# Patient Record
Sex: FEMALE | Race: WHITE | NOT HISPANIC OR LATINO | Employment: OTHER | URBAN - NONMETROPOLITAN AREA
[De-identification: names, ages, dates, MRNs, and addresses within clinical notes are randomized per-mention and may not be internally consistent; named-entity substitution may affect disease eponyms.]

---

## 2017-04-26 ENCOUNTER — HOSPITAL ENCOUNTER (EMERGENCY)
Facility: HOSPITAL | Age: 67
Discharge: HOME | End: 2017-04-26
Attending: EMERGENCY MEDICINE | Admitting: EMERGENCY MEDICINE

## 2017-04-26 ENCOUNTER — APPOINTMENT (OUTPATIENT)
Dept: GENERAL RADIOLOGY | Facility: HOSPITAL | Age: 67
End: 2017-04-26

## 2017-04-26 VITALS
TEMPERATURE: 99 F | RESPIRATION RATE: 20 BRPM | WEIGHT: 203 LBS | OXYGEN SATURATION: 96 % | HEART RATE: 88 BPM | SYSTOLIC BLOOD PRESSURE: 170 MMHG | DIASTOLIC BLOOD PRESSURE: 71 MMHG | BODY MASS INDEX: 35.97 KG/M2 | HEIGHT: 63 IN

## 2017-04-26 DIAGNOSIS — R09.81 NASAL CONGESTION: Primary | ICD-10-CM

## 2017-04-26 LAB
FLUAV AG NPH QL: NEGATIVE
FLUBV AG NPH QL IA: NEGATIVE
S PYO AG THROAT QL: NEGATIVE

## 2017-04-26 PROCEDURE — 87081 CULTURE SCREEN ONLY: CPT | Performed by: EMERGENCY MEDICINE

## 2017-04-26 PROCEDURE — 71020 HC CHEST PA AND LATERAL: CPT

## 2017-04-26 PROCEDURE — 87804 INFLUENZA ASSAY W/OPTIC: CPT | Performed by: EMERGENCY MEDICINE

## 2017-04-26 PROCEDURE — 87880 STREP A ASSAY W/OPTIC: CPT | Performed by: EMERGENCY MEDICINE

## 2017-04-26 PROCEDURE — 99283 EMERGENCY DEPT VISIT LOW MDM: CPT

## 2017-04-26 RX ORDER — OXYMETAZOLINE HYDROCHLORIDE 0.05 G/100ML
2 SPRAY NASAL ONCE
Status: COMPLETED | OUTPATIENT
Start: 2017-04-26 | End: 2017-04-26

## 2017-04-26 RX ADMIN — OXYMETAZOLINE HYDROCHLORIDE 2 SPRAY: 5 SPRAY NASAL at 18:45

## 2017-04-26 NOTE — DISCHARGE INSTRUCTIONS
"Most recent vital signs: /71  Pulse 88  Temp 99 °F (37.2 °C)  Resp 20  Ht 63\" (160 cm)  Wt 203 lb (92.1 kg)  SpO2 96%  BMI 35.96 kg/m2      --PLEASE READ THESE DIRECTIONS IN FULL--     Our goal is to provide the best care we can AND to find any medical or surgical emergency while you are in the ER. If a medical or surgical emergency was not identified during your visit (or if the issue was resolved during the visit), following these directions for follow-up with a primary care provider and/or specialists and following the return precautions will be the safest and most effective way to protect your health after leaving the emergency room.     Therefore, in addition to the conversation we had in the room, please read all of the information in these discharge instructions, take medications as directed, and follow-up as directed.     You should seek immediate medical help for:     Worsening pain that is not helped with prescribed pain medicines and/or the recommended doses of over the counter pain medicines such as acetaminophen (Tylenol) or ibuprofen (Motrin/Advil)*.   New or worsening chest pain or trouble breathing   New or worsening headache, confusion, weakness, or visual changes   New or worsening fever (>100.4 F) that does not improve with the recommended doses of over the counter fever treatments such as acetaminophen (Tylenol) or ibuprofen (Motrin/Advil)* for more than a few hours.   Any other concerns that you feel could be life, limb, or eye-sight threatening     As a reminder, if you received any medicines or prescriptions for medicines that may be sedating (\"narcotics\", \"opioids\"), do NOT:   - operate any vehicles   - take if you are already tired   - take if you have had or plan to have alcohol   - perform other responsibilities that require your full attention.     Common medications include, but are not limited to:   Opiates: Morphine, Norco, Lortab, Vicodin, Percocet, oxycodone, " hydrocodone, Dilaudid, hydromorphone   Benzodiazepines: Ativan, Valium, alprazolam, lorazepam, diazepam,   Other sedating medications: promethazine, Phenergan, trazodone, Benadryl, hydroxyzine, Vistaril, diphenhydramine, zolpidem, and Ambien     *If you have any history of GI (stomach/intestinal) bleeding, allergic reactions, kidney problems, and/or certain heart problems or there is any chance you could be pregnant, and have been told to avoid NSAIDs (ibuprofen, naproxen, Aleve, Motrin, Advil) please avoid these medications. Please remember that prescribed pain medicines often contain Tylenol/acetaminophen, so make sure your total daily (24 hour) intake does not exceed 4 grams or 4000mg.

## 2017-04-26 NOTE — ED PROVIDER NOTES
TRIAGE CHIEF COMPLAINT:     Nursing and triage notes reviewed    Chief Complaint   Patient presents with   • URI      HPI: Taya Lara is a 66 y.o. female who presents to the emergency department complaining of Upper respiratory infection like symptoms.  Patient states for the past several days has had nasal congestion, sinus pressure, nonproductive cough, and some general malaise.  No fever that they are aware of.  Drainage from the nose is been largely clear.  Drainage seems worse from the right nostril.  Some slight shortness of breath associated with cough that seems worse when she is eating.  States she did not get a flu shot this year.  Denies any chest pain.  No abdominal pain, nausea, or vomiting.     REVIEW OF SYSTEMS: Otherwise negative unless stated above     PAST MEDICAL HISTORY:   Past Medical History:   Diagnosis Date   • Diabetes mellitus    • Disease of thyroid gland    • Hyperlipidemia    • Hypertension    • Kidney stone         FAMILY HISTORY:   History reviewed. No pertinent family history.     SOCIAL HISTORY:   Social History     Social History   • Marital status: Single     Spouse name: N/A   • Number of children: N/A   • Years of education: N/A     Occupational History   • Not on file.     Social History Main Topics   • Smoking status: Former Smoker   • Smokeless tobacco: Not on file   • Alcohol use No   • Drug use: No   • Sexual activity: Defer     Other Topics Concern   • Not on file     Social History Narrative   • No narrative on file        SURGICAL HISTORY:   Past Surgical History:   Procedure Laterality Date   • CHOLECYSTECTOMY     • PACEMAKER IMPLANTATION     • TUBAL ABDOMINAL LIGATION          CURRENT MEDICATIONS:      Medication List      Notice     You have not been prescribed any medications.         ALLERGIES: Review of patient's allergies indicates no known allergies.     PHYSICAL EXAM:   VITAL SIGNS:   Vitals:    04/26/17 1723   BP: 170/71   Pulse: 88   Resp: 20   Temp: 99 °F  (37.2 °C)   SpO2: 96%      CONSTITUTIONAL: Awake, oriented, appears non-toxic   HENT: Atraumatic, normocephalic, oral mucosa pink and moist, airway patent. Nares patent without drainage. External ears normal. Some clear drainage from the bilateral nares, some tenderness to percussion over the right maxillary sinus.  EYES: Conjunctiva clear, EOMI, PERRL   NECK: Trachea midline, non-tender, supple   CARDIOVASCULAR: Normal heart rate, Normal rhythm, No murmurs, rubs, gallops   PULMONARY/CHEST: Clear to auscultation, no rhonchi, wheezes, or rales. Symmetrical breath sounds.  ABDOMINAL: Non-distended, soft, non-tender - no rebound or guarding.  NEUROLOGIC: Non-focal, moving all four extremities, no gross sensory or motor deficits.   EXTREMITIES: No clubbing, cyanosis, or edema   SKIN: Warm, Dry, No erythema, No rash     ED COURSE / MEDICAL DECISION MAKING:   Taya Lara is a 66 y.o. female who presents to the emergency department for evaluation of upper respiratory infection like symptoms.  Patient in no acute distress on arrival with stable vital signs.  Exam does reveal some tenderness over the right maxillary sinus.  Chest x-ray per my interpretation did not reveal any acute abnormalities.  Strep and influenza screens are negative.  Discussed with patient seen any overwhelming signs of infection at this time with some clear drainage from the nose.  Patient thinks it might be allergies.  Offered some allergy medication however patient stated she could use over-the-counter medication.  Return precautions were discussed.    DECISION TO DISCHARGE/ADMIT: see ED care timeline     FINAL IMPRESSION:   1 -- nasal congestion   2 --   3 --     Electronically signed by: Yesenia Randolph MD, 4/26/2017 5:55 PM       Yesenia Randolph MD  04/26/17 5439

## 2017-04-28 LAB — BACTERIA SPEC AEROBE CULT: NORMAL

## 2017-07-14 ENCOUNTER — APPOINTMENT (OUTPATIENT)
Dept: GENERAL RADIOLOGY | Facility: HOSPITAL | Age: 67
End: 2017-07-14

## 2017-07-14 ENCOUNTER — HOSPITAL ENCOUNTER (EMERGENCY)
Facility: HOSPITAL | Age: 67
Discharge: HOME | End: 2017-07-14
Attending: EMERGENCY MEDICINE | Admitting: EMERGENCY MEDICINE

## 2017-07-14 VITALS
TEMPERATURE: 98 F | SYSTOLIC BLOOD PRESSURE: 155 MMHG | HEART RATE: 95 BPM | DIASTOLIC BLOOD PRESSURE: 77 MMHG | BODY MASS INDEX: 35.44 KG/M2 | RESPIRATION RATE: 18 BRPM | WEIGHT: 200 LBS | OXYGEN SATURATION: 94 % | HEIGHT: 63 IN

## 2017-07-14 DIAGNOSIS — M25.542 JOINT PAIN IN FINGERS OF LEFT HAND: Primary | ICD-10-CM

## 2017-07-14 LAB
ALBUMIN SERPL-MCNC: 4.3 G/DL (ref 3.5–5)
ALBUMIN/GLOB SERPL: 1.5 G/DL (ref 1–2)
ALP SERPL-CCNC: 50 U/L (ref 38–126)
ALT SERPL W P-5'-P-CCNC: 36 U/L (ref 13–69)
ANION GAP SERPL CALCULATED.3IONS-SCNC: 15.8 MMOL/L
AST SERPL-CCNC: 27 U/L (ref 15–46)
BASOPHILS # BLD AUTO: 0.03 10*3/MM3 (ref 0–0.2)
BASOPHILS NFR BLD AUTO: 0.3 % (ref 0–2.5)
BILIRUB SERPL-MCNC: 0.3 MG/DL (ref 0.2–1.3)
BUN BLD-MCNC: 23 MG/DL (ref 7–20)
BUN/CREAT SERPL: 25.6 (ref 7.1–23.5)
CALCIUM SPEC-SCNC: 10.1 MG/DL (ref 8.4–10.2)
CHLORIDE SERPL-SCNC: 100 MMOL/L (ref 98–107)
CO2 SERPL-SCNC: 30 MMOL/L (ref 26–30)
CREAT BLD-MCNC: 0.9 MG/DL (ref 0.6–1.3)
DEPRECATED RDW RBC AUTO: 45.4 FL (ref 37–54)
EOSINOPHIL # BLD AUTO: 0.12 10*3/MM3 (ref 0–0.7)
EOSINOPHIL NFR BLD AUTO: 1.4 % (ref 0–7)
ERYTHROCYTE [DISTWIDTH] IN BLOOD BY AUTOMATED COUNT: 13.1 % (ref 11.5–14.5)
GFR SERPL CREATININE-BSD FRML MDRD: 63 ML/MIN/1.73
GLOBULIN UR ELPH-MCNC: 2.9 GM/DL
GLUCOSE BLD-MCNC: 211 MG/DL (ref 74–98)
HCT VFR BLD AUTO: 39.8 % (ref 37–47)
HGB BLD-MCNC: 12.9 G/DL (ref 12–16)
IMM GRANULOCYTES # BLD: 0.03 10*3/MM3 (ref 0–0.06)
IMM GRANULOCYTES NFR BLD: 0.3 % (ref 0–0.6)
LYMPHOCYTES # BLD AUTO: 3.14 10*3/MM3 (ref 0.6–3.4)
LYMPHOCYTES NFR BLD AUTO: 35.7 % (ref 10–50)
MCH RBC QN AUTO: 30.6 PG (ref 27–31)
MCHC RBC AUTO-ENTMCNC: 32.4 G/DL (ref 30–37)
MCV RBC AUTO: 94.5 FL (ref 81–99)
MONOCYTES # BLD AUTO: 0.75 10*3/MM3 (ref 0–0.9)
MONOCYTES NFR BLD AUTO: 8.5 % (ref 0–12)
NEUTROPHILS # BLD AUTO: 4.72 10*3/MM3 (ref 2–6.9)
NEUTROPHILS NFR BLD AUTO: 53.8 % (ref 37–80)
NRBC BLD MANUAL-RTO: 0 /100 WBC (ref 0–0)
PLATELET # BLD AUTO: 312 10*3/MM3 (ref 130–400)
PMV BLD AUTO: 11.3 FL (ref 6–12)
POTASSIUM BLD-SCNC: 3.8 MMOL/L (ref 3.5–5.1)
PROT SERPL-MCNC: 7.2 G/DL (ref 6.3–8.2)
RBC # BLD AUTO: 4.21 10*6/MM3 (ref 4.2–5.4)
SODIUM BLD-SCNC: 142 MMOL/L (ref 137–145)
URATE SERPL-MCNC: 4.8 MG/DL (ref 2.5–8.5)
WBC NRBC COR # BLD: 8.79 10*3/MM3 (ref 4.8–10.8)

## 2017-07-14 PROCEDURE — 84550 ASSAY OF BLOOD/URIC ACID: CPT | Performed by: NURSE PRACTITIONER

## 2017-07-14 PROCEDURE — 85025 COMPLETE CBC W/AUTO DIFF WBC: CPT | Performed by: NURSE PRACTITIONER

## 2017-07-14 PROCEDURE — 80053 COMPREHEN METABOLIC PANEL: CPT | Performed by: NURSE PRACTITIONER

## 2017-07-14 PROCEDURE — 99283 EMERGENCY DEPT VISIT LOW MDM: CPT

## 2017-07-14 PROCEDURE — 73110 X-RAY EXAM OF WRIST: CPT

## 2017-07-14 PROCEDURE — 73130 X-RAY EXAM OF HAND: CPT

## 2017-07-14 RX ORDER — MELOXICAM 7.5 MG/1
7.5 TABLET ORAL DAILY
Qty: 15 TABLET | Refills: 0 | Status: ON HOLD | OUTPATIENT
Start: 2017-07-14 | End: 2019-01-21

## 2017-07-14 NOTE — ED PROVIDER NOTES
Subjective   History of Present Illness  This is a 66-year-old female who comes in today complaining of left little finger pain radiating down into her wrist.  She states that she woke up about a month ago with swelling and pain to her little finger.  She states she felt like that it was probably arthritis and just ignored it but the pain has not gotten any better and the swelling has not gone away.  She does have a history of psoriasis to both of her hands.  Review of Systems   Constitutional: Negative.    HENT: Negative.    Eyes: Negative.    Respiratory: Negative.    Cardiovascular: Negative.    Gastrointestinal: Negative.    Genitourinary: Negative.    Musculoskeletal: Positive for joint swelling.   Skin: Negative.    Neurological: Negative.    Psychiatric/Behavioral: Negative.    All other systems reviewed and are negative.      Past Medical History:   Diagnosis Date   • Diabetes mellitus    • Disease of thyroid gland    • Hyperlipidemia    • Hypertension    • Kidney stone        No Known Allergies    Past Surgical History:   Procedure Laterality Date   • CHOLECYSTECTOMY     • PACEMAKER IMPLANTATION     • TUBAL ABDOMINAL LIGATION         History reviewed. No pertinent family history.    Social History     Social History   • Marital status: Single     Spouse name: N/A   • Number of children: N/A   • Years of education: N/A     Social History Main Topics   • Smoking status: Former Smoker   • Smokeless tobacco: None   • Alcohol use No   • Drug use: No   • Sexual activity: Defer     Other Topics Concern   • None     Social History Narrative           Objective   Physical Exam   Constitutional: She appears well-developed and well-nourished.   Nursing note and vitals reviewed.  GEN: No acute distress  Head: Normocephalic, atraumatic  Eyes: Pupils equal round reactive to light  ENT: Posterior pharynx normal in appearance, oral mucosa is moist  Chest: Nontender to palpation  Cardiovascular: Regular rate  Lungs: Clear  to auscultation bilaterally  Abdomen: Soft, nontender, nondistended, no peritoneal signs  Extremities: No edema, normal appearance left 5th finger with edema, limited rom. Tenderness and edema down palm and lateral side of wrist with limited ROM  Neuro: GCS 15  Psych: Mood and affect are appropriate      Procedures         ED Course  ED Course                  MDM  Number of Diagnoses or Management Options     Amount and/or Complexity of Data Reviewed  Clinical lab tests: ordered and reviewed  Tests in the radiology section of CPT®: ordered and reviewed    Risk of Complications, Morbidity, and/or Mortality  Presenting problems: moderate  Diagnostic procedures: moderate  Management options: moderate        Final diagnoses:   Joint pain in fingers of left hand            ASHLIE Singh  07/14/17 2005

## 2018-01-18 ENCOUNTER — APPOINTMENT (OUTPATIENT)
Dept: CT IMAGING | Facility: HOSPITAL | Age: 68
End: 2018-01-18

## 2018-01-18 ENCOUNTER — APPOINTMENT (OUTPATIENT)
Dept: GENERAL RADIOLOGY | Facility: HOSPITAL | Age: 68
End: 2018-01-18

## 2018-01-18 ENCOUNTER — HOSPITAL ENCOUNTER (EMERGENCY)
Facility: HOSPITAL | Age: 68
Discharge: HOME | End: 2018-01-18
Attending: EMERGENCY MEDICINE | Admitting: EMERGENCY MEDICINE

## 2018-01-18 VITALS
WEIGHT: 150 LBS | DIASTOLIC BLOOD PRESSURE: 74 MMHG | HEART RATE: 77 BPM | SYSTOLIC BLOOD PRESSURE: 137 MMHG | HEIGHT: 63 IN | OXYGEN SATURATION: 95 % | TEMPERATURE: 98.3 F | BODY MASS INDEX: 26.58 KG/M2 | RESPIRATION RATE: 18 BRPM

## 2018-01-18 DIAGNOSIS — N30.00 ACUTE CYSTITIS WITHOUT HEMATURIA: Primary | ICD-10-CM

## 2018-01-18 DIAGNOSIS — R42 DIZZINESS: ICD-10-CM

## 2018-01-18 LAB
ALBUMIN SERPL-MCNC: 4.5 G/DL (ref 3.5–5)
ALBUMIN/GLOB SERPL: 1.5 G/DL (ref 1–2)
ALP SERPL-CCNC: 61 U/L (ref 38–126)
ALT SERPL W P-5'-P-CCNC: 42 U/L (ref 13–69)
ANION GAP SERPL CALCULATED.3IONS-SCNC: 12 MMOL/L
AST SERPL-CCNC: 30 U/L (ref 15–46)
BACTERIA UR QL AUTO: ABNORMAL /HPF
BASOPHILS # BLD AUTO: 0.04 10*3/MM3 (ref 0–0.2)
BASOPHILS NFR BLD AUTO: 0.4 % (ref 0–2.5)
BILIRUB SERPL-MCNC: 0.4 MG/DL (ref 0.2–1.3)
BILIRUB UR QL STRIP: NEGATIVE
BUN BLD-MCNC: 23 MG/DL (ref 7–20)
BUN/CREAT SERPL: 28.8 (ref 7.1–23.5)
CALCIUM SPEC-SCNC: 10.8 MG/DL (ref 8.4–10.2)
CHLORIDE SERPL-SCNC: 101 MMOL/L (ref 98–107)
CLARITY UR: CLEAR
CO2 SERPL-SCNC: 31 MMOL/L (ref 26–30)
COLOR UR: YELLOW
CREAT BLD-MCNC: 0.8 MG/DL (ref 0.6–1.3)
DEPRECATED RDW RBC AUTO: 44.7 FL (ref 37–54)
EOSINOPHIL # BLD AUTO: 0.16 10*3/MM3 (ref 0–0.7)
EOSINOPHIL NFR BLD AUTO: 1.5 % (ref 0–7)
ERYTHROCYTE [DISTWIDTH] IN BLOOD BY AUTOMATED COUNT: 13.2 % (ref 11.5–14.5)
GFR SERPL CREATININE-BSD FRML MDRD: 72 ML/MIN/1.73
GLOBULIN UR ELPH-MCNC: 3.1 GM/DL
GLUCOSE BLD-MCNC: 123 MG/DL (ref 74–98)
GLUCOSE BLDC GLUCOMTR-MCNC: 117 MG/DL (ref 70–130)
GLUCOSE UR STRIP-MCNC: NEGATIVE MG/DL
HCT VFR BLD AUTO: 40.2 % (ref 37–47)
HGB BLD-MCNC: 13 G/DL (ref 12–16)
HGB UR QL STRIP.AUTO: NEGATIVE
HOLD SPECIMEN: NORMAL
HOLD SPECIMEN: NORMAL
HYALINE CASTS UR QL AUTO: ABNORMAL /LPF
IMM GRANULOCYTES # BLD: 0.03 10*3/MM3 (ref 0–0.06)
IMM GRANULOCYTES NFR BLD: 0.3 % (ref 0–0.6)
KETONES UR QL STRIP: NEGATIVE
LEUKOCYTE ESTERASE UR QL STRIP.AUTO: ABNORMAL
LYMPHOCYTES # BLD AUTO: 3.36 10*3/MM3 (ref 0.6–3.4)
LYMPHOCYTES NFR BLD AUTO: 31.7 % (ref 10–50)
MAGNESIUM SERPL-MCNC: 1.8 MG/DL (ref 1.6–2.3)
MCH RBC QN AUTO: 30.2 PG (ref 27–31)
MCHC RBC AUTO-ENTMCNC: 32.3 G/DL (ref 30–37)
MCV RBC AUTO: 93.5 FL (ref 81–99)
MONOCYTES # BLD AUTO: 0.78 10*3/MM3 (ref 0–0.9)
MONOCYTES NFR BLD AUTO: 7.4 % (ref 0–12)
NEUTROPHILS # BLD AUTO: 6.22 10*3/MM3 (ref 2–6.9)
NEUTROPHILS NFR BLD AUTO: 58.7 % (ref 37–80)
NITRITE UR QL STRIP: NEGATIVE
NRBC BLD MANUAL-RTO: 0 /100 WBC (ref 0–0)
PH UR STRIP.AUTO: 6 [PH] (ref 5–8)
PLATELET # BLD AUTO: 313 10*3/MM3 (ref 130–400)
PMV BLD AUTO: 11.4 FL (ref 6–12)
POTASSIUM BLD-SCNC: 4 MMOL/L (ref 3.5–5.1)
PROT SERPL-MCNC: 7.6 G/DL (ref 6.3–8.2)
PROT UR QL STRIP: NEGATIVE
RBC # BLD AUTO: 4.3 10*6/MM3 (ref 4.2–5.4)
RBC # UR: ABNORMAL /HPF
REF LAB TEST METHOD: ABNORMAL
SODIUM BLD-SCNC: 140 MMOL/L (ref 137–145)
SP GR UR STRIP: 1.01 (ref 1–1.03)
SQUAMOUS #/AREA URNS HPF: ABNORMAL /HPF
TROPONIN I SERPL-MCNC: <0.012 NG/ML (ref 0–0.03)
UROBILINOGEN UR QL STRIP: ABNORMAL
WBC NRBC COR # BLD: 10.59 10*3/MM3 (ref 4.8–10.8)
WBC UR QL AUTO: ABNORMAL /HPF
WHOLE BLOOD HOLD SPECIMEN: NORMAL
WHOLE BLOOD HOLD SPECIMEN: NORMAL

## 2018-01-18 PROCEDURE — 82962 GLUCOSE BLOOD TEST: CPT

## 2018-01-18 PROCEDURE — 85025 COMPLETE CBC W/AUTO DIFF WBC: CPT | Performed by: EMERGENCY MEDICINE

## 2018-01-18 PROCEDURE — 84484 ASSAY OF TROPONIN QUANT: CPT | Performed by: EMERGENCY MEDICINE

## 2018-01-18 PROCEDURE — 71045 X-RAY EXAM CHEST 1 VIEW: CPT

## 2018-01-18 PROCEDURE — 87086 URINE CULTURE/COLONY COUNT: CPT | Performed by: EMERGENCY MEDICINE

## 2018-01-18 PROCEDURE — 70450 CT HEAD/BRAIN W/O DYE: CPT

## 2018-01-18 PROCEDURE — 80053 COMPREHEN METABOLIC PANEL: CPT | Performed by: EMERGENCY MEDICINE

## 2018-01-18 PROCEDURE — 99284 EMERGENCY DEPT VISIT MOD MDM: CPT

## 2018-01-18 PROCEDURE — 93005 ELECTROCARDIOGRAM TRACING: CPT | Performed by: EMERGENCY MEDICINE

## 2018-01-18 PROCEDURE — 83735 ASSAY OF MAGNESIUM: CPT | Performed by: EMERGENCY MEDICINE

## 2018-01-18 PROCEDURE — 81001 URINALYSIS AUTO W/SCOPE: CPT | Performed by: EMERGENCY MEDICINE

## 2018-01-18 RX ORDER — BUSPIRONE HYDROCHLORIDE 10 MG/1
10 TABLET ORAL 3 TIMES DAILY
COMMUNITY

## 2018-01-18 RX ORDER — DIGOXIN 125 MCG
125 TABLET ORAL
COMMUNITY

## 2018-01-18 RX ORDER — MULTIPLE VITAMINS W/ MINERALS TAB 9MG-400MCG
1 TAB ORAL DAILY
COMMUNITY

## 2018-01-18 RX ORDER — CEFUROXIME AXETIL 250 MG/1
500 TABLET ORAL ONCE
Status: COMPLETED | OUTPATIENT
Start: 2018-01-18 | End: 2018-01-18

## 2018-01-18 RX ORDER — VENLAFAXINE HYDROCHLORIDE 150 MG/1
150 CAPSULE, EXTENDED RELEASE ORAL DAILY
COMMUNITY

## 2018-01-18 RX ORDER — LEVOTHYROXINE SODIUM 0.12 MG/1
175 TABLET ORAL DAILY
COMMUNITY

## 2018-01-18 RX ORDER — SULFAMETHOXAZOLE AND TRIMETHOPRIM 800; 160 MG/1; MG/1
1 TABLET ORAL 2 TIMES DAILY
Qty: 10 TABLET | Refills: 0 | Status: SHIPPED | OUTPATIENT
Start: 2018-01-18 | End: 2018-01-23

## 2018-01-18 RX ORDER — FUROSEMIDE 40 MG/1
40 TABLET ORAL DAILY
COMMUNITY

## 2018-01-18 RX ORDER — ASPIRIN 81 MG/1
81 TABLET ORAL DAILY
COMMUNITY

## 2018-01-18 RX ORDER — SODIUM CHLORIDE 0.9 % (FLUSH) 0.9 %
10 SYRINGE (ML) INJECTION AS NEEDED
Status: DISCONTINUED | OUTPATIENT
Start: 2018-01-18 | End: 2018-01-19 | Stop reason: HOSPADM

## 2018-01-18 RX ORDER — CHLORAL HYDRATE 500 MG
1000 CAPSULE ORAL
COMMUNITY

## 2018-01-18 RX ORDER — PRAVASTATIN SODIUM 80 MG/1
80 TABLET ORAL DAILY
COMMUNITY

## 2018-01-18 RX ORDER — FENOFIBRATE 145 MG/1
145 TABLET, COATED ORAL DAILY
COMMUNITY

## 2018-01-18 RX ORDER — CARVEDILOL 25 MG/1
25 TABLET ORAL DAILY
COMMUNITY

## 2018-01-18 RX ORDER — CLONIDINE HYDROCHLORIDE 0.1 MG/1
0.1 TABLET ORAL ONCE
Status: COMPLETED | OUTPATIENT
Start: 2018-01-18 | End: 2018-01-18

## 2018-01-18 RX ADMIN — CEFUROXIME AXETIL 500 MG: 250 TABLET ORAL at 22:04

## 2018-01-18 RX ADMIN — CLONIDINE HYDROCHLORIDE 0.1 MG: 0.1 TABLET ORAL at 20:16

## 2018-01-19 NOTE — DISCHARGE INSTRUCTIONS
Dizziness  Dizziness is a common problem. It makes you feel unsteady or lightheaded. You may feel like you are about to pass out (faint). Dizziness can lead to injury if you stumble or fall. Anyone can get dizzy, but dizziness is more common in older adults. This condition can be caused by a number of things, including:  · Medicines.  · Dehydration.  · Illness.  Follow these instructions at home:  Following these instructions may help with your condition:  Eating and drinking  · Drink enough fluid to keep your pee (urine) clear or pale yellow. This helps to keep you from getting dehydrated. Try to drink more clear fluids, such as water.  · Do not drink alcohol.  · Limit how much caffeine you drink or eat if told by your doctor.  · Limit how much salt you drink or eat if told by your doctor.  Activity  · Avoid making quick movements.  ¨ When you stand up from sitting in a chair, steady yourself until you feel okay.  ¨ In the morning, first sit up on the side of the bed. When you feel okay, stand slowly while you hold onto something. Do this until you know that your balance is fine.  · Move your legs often if you need to  one place for a long time. Tighten and relax your muscles in your legs while you are standing.  · Do not drive or use heavy machinery if you feel dizzy.  · Avoid bending down if you feel dizzy. Place items in your home so that they are easy for you to reach without leaning over.  Lifestyle  · Do not use any tobacco products, including cigarettes, chewing tobacco, or electronic cigarettes. If you need help quitting, ask your doctor.  · Try to lower your stress level, such as with yoga or meditation. Talk with your doctor if you need help.  General instructions  · Watch your dizziness for any changes.  · Take medicines only as told by your doctor. Talk with your doctor if you think that your dizziness is caused by a medicine that you are taking.  · Tell a friend or a family member that you are  feeling dizzy. If he or she notices any changes in your behavior, have this person call your doctor.  · Keep all follow-up visits as told by your doctor. This is important.  Contact a doctor if:  · Your dizziness does not go away.  · Your dizziness or light-headedness gets worse.  · You feel sick to your stomach (nauseous).  · You have trouble hearing.  · You have new symptoms.  · You are unsteady on your feet or you feel like the room is spinning.  Get help right away if:  · You throw up (vomit) or have diarrhea and are unable to eat or drink anything.  · You have trouble:  ¨ Talking.  ¨ Walking.  ¨ Swallowing.  ¨ Using your arms, hands, or legs.  · You feel generally weak.  · You are not thinking clearly or you have trouble forming sentences. It may take a friend or family member to notice this.  · You have:  ¨ Chest pain.  ¨ Pain in your belly (abdomen).  ¨ Shortness of breath.  ¨ Sweating.  · Your vision changes.  · You are bleeding.  · You have a headache.  · You have neck pain or a stiff neck.  · You have a fever.  This information is not intended to replace advice given to you by your health care provider. Make sure you discuss any questions you have with your health care provider.  Document Released: 12/06/2012 Document Revised: 05/25/2017 Document Reviewed: 12/14/2015  Elsevier Interactive Patient Education © 2017 [employer]

## 2018-01-19 NOTE — ED PROVIDER NOTES
Subjective   HPI Comments: 67-year-old female presents to the emergency department with dizziness intermittently for one month.  Also reports that she has difficulty with hearing and has been doing sign language with the patient.  He reports that the patient has had dizziness for 1 month intermittently.  He reports that the symptoms are worse with movement.  Fever no chills or chills no blurred vision.  No chest pain no shortness of breath no passing out.      History provided by:  Patient and spouse   used: No        Review of Systems   Neurological: Positive for dizziness.   All other systems reviewed and are negative.      Past Medical History:   Diagnosis Date   • Deaf    • Diabetes mellitus    • Disease of thyroid gland    • Hyperlipidemia    • Hypertension    • Kidney stone        No Known Allergies    Past Surgical History:   Procedure Laterality Date   • CHOLECYSTECTOMY     • PACEMAKER IMPLANTATION     • TUBAL ABDOMINAL LIGATION         History reviewed. No pertinent family history.    Social History     Social History   • Marital status: Single     Spouse name: N/A   • Number of children: N/A   • Years of education: N/A     Social History Main Topics   • Smoking status: Former Smoker   • Smokeless tobacco: None   • Alcohol use No   • Drug use: No   • Sexual activity: Defer     Other Topics Concern   • None     Social History Narrative   • None           Objective   Physical Exam   Constitutional: She is oriented to person, place, and time. She appears well-developed and well-nourished.   Eyes: EOM are normal.   Neck: Normal range of motion. Neck supple.   Cardiovascular: Normal rate and regular rhythm.    Pulmonary/Chest: Effort normal and breath sounds normal.   Abdominal: Soft. Bowel sounds are normal.   Musculoskeletal: Normal range of motion.   Neurological: She is alert and oriented to person, place, and time.   Skin: Skin is warm and dry.   Psychiatric: She has a normal mood and  affect. Her behavior is normal. Judgment and thought content normal.   Nursing note and vitals reviewed.      Procedures         ED Course  ED Course                  MDM    Final diagnoses:   Acute cystitis without hematuria   Dizziness            Kirit Ogden Jr., PA-C  01/18/18 6340

## 2018-01-20 LAB — BACTERIA SPEC AEROBE CULT: NORMAL

## 2018-01-22 ENCOUNTER — LAB REQUISITION (OUTPATIENT)
Dept: LAB | Facility: HOSPITAL | Age: 68
End: 2018-01-22

## 2018-01-22 DIAGNOSIS — Z00.00 ROUTINE GENERAL MEDICAL EXAMINATION AT A HEALTH CARE FACILITY: ICD-10-CM

## 2018-01-22 LAB — TROPONIN I SERPL-MCNC: <0.006 NG/ML

## 2018-01-22 PROCEDURE — 84484 ASSAY OF TROPONIN QUANT: CPT

## 2018-02-07 ENCOUNTER — HOSPITAL ENCOUNTER (EMERGENCY)
Facility: HOSPITAL | Age: 68
Discharge: HOME | End: 2018-02-07
Attending: EMERGENCY MEDICINE | Admitting: EMERGENCY MEDICINE

## 2018-02-07 VITALS
BODY MASS INDEX: 34.91 KG/M2 | DIASTOLIC BLOOD PRESSURE: 85 MMHG | WEIGHT: 197 LBS | OXYGEN SATURATION: 96 % | HEIGHT: 63 IN | RESPIRATION RATE: 18 BRPM | TEMPERATURE: 98.2 F | HEART RATE: 90 BPM | SYSTOLIC BLOOD PRESSURE: 178 MMHG

## 2018-02-07 DIAGNOSIS — S16.1XXA CERVICAL STRAIN, ACUTE, INITIAL ENCOUNTER: Primary | ICD-10-CM

## 2018-02-07 PROCEDURE — 96372 THER/PROPH/DIAG INJ SC/IM: CPT

## 2018-02-07 PROCEDURE — 99283 EMERGENCY DEPT VISIT LOW MDM: CPT

## 2018-02-07 PROCEDURE — 25010000002 ORPHENADRINE CITRATE PER 60 MG: Performed by: PHYSICIAN ASSISTANT

## 2018-02-07 PROCEDURE — 25010000002 KETOROLAC TROMETHAMINE PER 15 MG: Performed by: PHYSICIAN ASSISTANT

## 2018-02-07 RX ORDER — ORPHENADRINE CITRATE 100 MG/1
100 TABLET, EXTENDED RELEASE ORAL 2 TIMES DAILY PRN
Qty: 14 TABLET | Refills: 0 | Status: SHIPPED | OUTPATIENT
Start: 2018-02-07 | End: 2019-01-14

## 2018-02-07 RX ORDER — ORPHENADRINE CITRATE 30 MG/ML
60 INJECTION INTRAMUSCULAR; INTRAVENOUS ONCE
Status: COMPLETED | OUTPATIENT
Start: 2018-02-07 | End: 2018-02-07

## 2018-02-07 RX ORDER — KETOROLAC TROMETHAMINE 30 MG/ML
30 INJECTION, SOLUTION INTRAMUSCULAR; INTRAVENOUS ONCE
Status: COMPLETED | OUTPATIENT
Start: 2018-02-07 | End: 2018-02-07

## 2018-02-07 RX ORDER — LISINOPRIL 40 MG/1
40 TABLET ORAL DAILY
COMMUNITY
End: 2019-02-07

## 2018-02-07 RX ORDER — CLONIDINE HYDROCHLORIDE 0.2 MG/1
0.2 TABLET ORAL ONCE
Status: COMPLETED | OUTPATIENT
Start: 2018-02-07 | End: 2018-02-07

## 2018-02-07 RX ADMIN — ORPHENADRINE CITRATE 60 MG: 30 INJECTION INTRAMUSCULAR; INTRAVENOUS at 19:37

## 2018-02-07 RX ADMIN — CLONIDINE HYDROCHLORIDE 0.2 MG: 0.2 TABLET ORAL at 20:35

## 2018-02-07 RX ADMIN — KETOROLAC TROMETHAMINE 30 MG: 30 INJECTION, SOLUTION INTRAMUSCULAR at 19:42

## 2018-02-08 NOTE — ED PROVIDER NOTES
Subjective   HPI Comments: 67-year-old female in the ER with her live-in boyfriend who gives the history as the patient is deaf.  Patient woke up with pain in her neck and right shoulder area, the boyfriend reports the patient slept wrong in the night.  Pain increases with range of motion movement.  She has not been taking any type treatment for this.  She denies any chest pain, difficulty breathing or cough.  Denies any numb or tingling sensations.      History provided by:  Significant other  History limited by: no limits.   used: No        Review of Systems   Constitutional: Negative for activity change, appetite change, fatigue and fever.   HENT: Negative for congestion, ear pain, rhinorrhea, sneezing and sore throat.    Eyes: Negative for pain, discharge and redness.   Respiratory: Negative for cough, shortness of breath and wheezing.    Cardiovascular: Negative.    Gastrointestinal: Negative for abdominal pain, constipation, diarrhea, nausea and vomiting.   Endocrine: Negative.    Genitourinary: Negative for difficulty urinating, dysuria and frequency.   Musculoskeletal: Positive for neck pain. Negative for back pain.   Skin: Negative for color change, pallor and rash.   Allergic/Immunologic: Negative.    Neurological: Negative.    Hematological: Negative.    Psychiatric/Behavioral: Negative.    All other systems reviewed and are negative.      Past Medical History:   Diagnosis Date   • Deaf    • Diabetes mellitus    • Disease of thyroid gland    • Hyperlipidemia    • Hypertension    • Kidney stone        No Known Allergies    Past Surgical History:   Procedure Laterality Date   • CHOLECYSTECTOMY     • PACEMAKER IMPLANTATION     • TUBAL ABDOMINAL LIGATION         History reviewed. No pertinent family history.    Social History     Social History   • Marital status: Single     Spouse name: N/A   • Number of children: N/A   • Years of education: N/A     Social History Main Topics   • Smoking  status: Former Smoker   • Smokeless tobacco: None   • Alcohol use No   • Drug use: No   • Sexual activity: Defer     Other Topics Concern   • None     Social History Narrative           Objective   Physical Exam   Constitutional: She is oriented to person, place, and time. She appears well-developed and well-nourished. No distress.   HENT:   Head: Normocephalic and atraumatic.   Right Ear: External ear normal.   Left Ear: External ear normal.   Nose: Nose normal.   Mouth/Throat: Oropharynx is clear and moist. No oropharyngeal exudate.   Eyes: Conjunctivae and EOM are normal. Pupils are equal, round, and reactive to light. Right eye exhibits no discharge. Left eye exhibits no discharge. No scleral icterus.   Neck: Normal range of motion. Neck supple. No JVD present. No tracheal deviation present.   Cardiovascular: Normal rate, regular rhythm and normal heart sounds.    Pulmonary/Chest: Effort normal and breath sounds normal. No stridor. No respiratory distress. She has no wheezes. She has no rales.   Abdominal: Soft. Bowel sounds are normal. She exhibits no distension and no mass. There is no tenderness. There is no rebound and no guarding. No hernia.   Musculoskeletal: Normal range of motion. She exhibits no edema or deformity.   Right shoulder/neck tenderness, right trapezium muscle spasm.   Neurological: She is alert and oriented to person, place, and time. No cranial nerve deficit. Coordination normal.   Skin: Skin is warm and dry. No rash noted. She is not diaphoretic. No erythema.   Psychiatric: She has a normal mood and affect. Her behavior is normal. Judgment and thought content normal.   Nursing note and vitals reviewed.      Procedures         ED Course  ED Course   Comment By Time   Patient feeling improved, discussed the diagnosis of pulled muscle with her boyfriend.  Will give prescriptions of muscle relaxant and anti-inflammatory, have encouraged the patient not to do any type of lifting tugging pulling  or any step strenuous activity with the affected extremity.  All questions answered to her satisfaction, she is agreeable to treatment plan to be discharged home. Jazz Espinal PA-C 02/07 2019                  [facility]    Final diagnoses:   Cervical strain, acute, initial encounter            Jazz Espinal PA-C  02/07/18 2031

## 2018-02-08 NOTE — DISCHARGE INSTRUCTIONS
Do not drive or operate machinery while taking Norflex.  Avoid any heavy lifting, tugging pulling or any other type strenuous activity with your affected extremity.  Recheck with your primary care physician if not gradually improved in 2-3 days.  Return to the ER for any chest pain, difficulty breathing, severe headache or any other worrisome changes.

## 2019-01-14 ENCOUNTER — APPOINTMENT (OUTPATIENT)
Dept: PREADMISSION TESTING | Facility: HOSPITAL | Age: 69
End: 2019-01-14

## 2019-01-14 ENCOUNTER — HOSPITAL ENCOUNTER (OUTPATIENT)
Dept: GENERAL RADIOLOGY | Facility: HOSPITAL | Age: 69
Discharge: HOME | End: 2019-01-14
Admitting: ORTHOPAEDIC SURGERY

## 2019-01-14 VITALS — BODY MASS INDEX: 32.73 KG/M2 | HEIGHT: 63 IN | WEIGHT: 184.75 LBS

## 2019-01-14 LAB
ANION GAP SERPL CALCULATED.3IONS-SCNC: 7 MMOL/L (ref 3–11)
BUN BLD-MCNC: 19 MG/DL (ref 9–23)
BUN/CREAT SERPL: 22.6 (ref 7–25)
CALCIUM SPEC-SCNC: 11.2 MG/DL (ref 8.7–10.4)
CHLORIDE SERPL-SCNC: 102 MMOL/L (ref 99–109)
CO2 SERPL-SCNC: 31 MMOL/L (ref 20–31)
CREAT BLD-MCNC: 0.84 MG/DL (ref 0.6–1.3)
DEPRECATED RDW RBC AUTO: 46.8 FL (ref 37–54)
ERYTHROCYTE [DISTWIDTH] IN BLOOD BY AUTOMATED COUNT: 13.6 % (ref 11.3–14.5)
GFR SERPL CREATININE-BSD FRML MDRD: 67 ML/MIN/1.73
GLUCOSE BLD-MCNC: 98 MG/DL (ref 70–100)
HBA1C MFR BLD: 6.8 % (ref 4.8–5.6)
HCT VFR BLD AUTO: 44.7 % (ref 34.5–44)
HGB BLD-MCNC: 14.9 G/DL (ref 11.5–15.5)
MCH RBC QN AUTO: 31.4 PG (ref 27–31)
MCHC RBC AUTO-ENTMCNC: 33.3 G/DL (ref 32–36)
MCV RBC AUTO: 94.3 FL (ref 80–99)
PLATELET # BLD AUTO: 318 10*3/MM3 (ref 150–450)
PMV BLD AUTO: 11.6 FL (ref 6–12)
POTASSIUM BLD-SCNC: 4.4 MMOL/L (ref 3.5–5.5)
RBC # BLD AUTO: 4.74 10*6/MM3 (ref 3.89–5.14)
SODIUM BLD-SCNC: 140 MMOL/L (ref 132–146)
WBC NRBC COR # BLD: 10.24 10*3/MM3 (ref 3.5–10.8)

## 2019-01-14 PROCEDURE — 85027 COMPLETE CBC AUTOMATED: CPT | Performed by: ORTHOPAEDIC SURGERY

## 2019-01-14 PROCEDURE — 80048 BASIC METABOLIC PNL TOTAL CA: CPT | Performed by: ORTHOPAEDIC SURGERY

## 2019-01-14 PROCEDURE — 83036 HEMOGLOBIN GLYCOSYLATED A1C: CPT | Performed by: ORTHOPAEDIC SURGERY

## 2019-01-14 PROCEDURE — 36415 COLL VENOUS BLD VENIPUNCTURE: CPT

## 2019-01-14 PROCEDURE — 71046 X-RAY EXAM CHEST 2 VIEWS: CPT

## 2019-01-14 RX ORDER — BETAMETHASONE DIPROPIONATE 0.5 MG/G
1 LOTION TOPICAL 2 TIMES DAILY PRN
COMMUNITY

## 2019-01-14 RX ORDER — IBUPROFEN 200 MG
200 TABLET ORAL EVERY 6 HOURS PRN
Status: ON HOLD | COMMUNITY
End: 2019-01-21

## 2019-01-14 NOTE — PAT
Prescription given to patient and explained.     Eras information given to patient and explained.       Pt deaf so  used for pat process.

## 2019-01-20 ENCOUNTER — ANESTHESIA EVENT (OUTPATIENT)
Dept: PERIOP | Facility: HOSPITAL | Age: 69
End: 2019-01-20

## 2019-01-20 RX ORDER — SODIUM CHLORIDE 0.9 % (FLUSH) 0.9 %
3 SYRINGE (ML) INJECTION EVERY 12 HOURS SCHEDULED
Status: CANCELLED | OUTPATIENT
Start: 2019-01-20

## 2019-01-20 RX ORDER — FAMOTIDINE 10 MG/ML
20 INJECTION, SOLUTION INTRAVENOUS ONCE
Status: CANCELLED | OUTPATIENT
Start: 2019-01-20 | End: 2019-01-20

## 2019-01-20 RX ORDER — SODIUM CHLORIDE 0.9 % (FLUSH) 0.9 %
3-10 SYRINGE (ML) INJECTION AS NEEDED
Status: CANCELLED | OUTPATIENT
Start: 2019-01-20

## 2019-01-21 ENCOUNTER — ANESTHESIA (OUTPATIENT)
Dept: PERIOP | Facility: HOSPITAL | Age: 69
End: 2019-01-21

## 2019-01-21 ENCOUNTER — APPOINTMENT (OUTPATIENT)
Dept: GENERAL RADIOLOGY | Facility: HOSPITAL | Age: 69
End: 2019-01-21

## 2019-01-21 ENCOUNTER — HOSPITAL ENCOUNTER (INPATIENT)
Facility: HOSPITAL | Age: 69
LOS: 3 days | Discharge: HOME | End: 2019-01-24
Attending: ORTHOPAEDIC SURGERY | Admitting: ORTHOPAEDIC SURGERY

## 2019-01-21 DIAGNOSIS — Z74.09 IMPAIRED MOBILITY AND ADLS: Primary | ICD-10-CM

## 2019-01-21 DIAGNOSIS — Z78.9 IMPAIRED MOBILITY AND ADLS: Primary | ICD-10-CM

## 2019-01-21 PROBLEM — E03.9 HYPOTHYROIDISM (ACQUIRED): Status: ACTIVE | Noted: 2019-01-21

## 2019-01-21 PROBLEM — H91.93 BILATERAL DEAFNESS: Chronic | Status: ACTIVE | Noted: 2019-01-21

## 2019-01-21 PROBLEM — E11.9 TYPE 2 DIABETES MELLITUS: Status: ACTIVE | Noted: 2019-01-21

## 2019-01-21 PROBLEM — E78.5 HLD (HYPERLIPIDEMIA): Chronic | Status: ACTIVE | Noted: 2019-01-21

## 2019-01-21 PROBLEM — I10 ESSENTIAL HYPERTENSION: Chronic | Status: ACTIVE | Noted: 2019-01-21

## 2019-01-21 PROBLEM — Z96.611 STATUS POST REVERSE ARTHROPLASTY OF RIGHT SHOULDER: Status: ACTIVE | Noted: 2019-01-21

## 2019-01-21 LAB
GLUCOSE BLDC GLUCOMTR-MCNC: 141 MG/DL (ref 70–130)
GLUCOSE BLDC GLUCOMTR-MCNC: 207 MG/DL (ref 70–130)
GLUCOSE BLDC GLUCOMTR-MCNC: 266 MG/DL (ref 70–130)
GLUCOSE BLDC GLUCOMTR-MCNC: 310 MG/DL (ref 70–130)

## 2019-01-21 PROCEDURE — C1776 JOINT DEVICE (IMPLANTABLE): HCPCS | Performed by: ORTHOPAEDIC SURGERY

## 2019-01-21 PROCEDURE — L3670 SO ACRO/CLAV CAN WEB PRE OTS: HCPCS | Performed by: ORTHOPAEDIC SURGERY

## 2019-01-21 PROCEDURE — 82962 GLUCOSE BLOOD TEST: CPT

## 2019-01-21 PROCEDURE — 25010000002 ONDANSETRON PER 1 MG: Performed by: NURSE ANESTHETIST, CERTIFIED REGISTERED

## 2019-01-21 PROCEDURE — 25010000002 DEXAMETHASONE PER 1 MG: Performed by: NURSE ANESTHETIST, CERTIFIED REGISTERED

## 2019-01-21 PROCEDURE — 25010000002 FENTANYL CITRATE (PF) 100 MCG/2ML SOLUTION: Performed by: NURSE ANESTHETIST, CERTIFIED REGISTERED

## 2019-01-21 PROCEDURE — C1713 ANCHOR/SCREW BN/BN,TIS/BN: HCPCS | Performed by: ORTHOPAEDIC SURGERY

## 2019-01-21 PROCEDURE — 25010000002 VANCOMYCIN 10 G RECONSTITUTED SOLUTION: Performed by: ORTHOPAEDIC SURGERY

## 2019-01-21 PROCEDURE — 63710000001 INSULIN LISPRO (HUMAN) PER 5 UNITS: Performed by: NURSE PRACTITIONER

## 2019-01-21 PROCEDURE — 0RRJ00Z REPLACEMENT OF RIGHT SHOULDER JOINT WITH REVERSE BALL AND SOCKET SYNTHETIC SUBSTITUTE, OPEN APPROACH: ICD-10-PCS | Performed by: ORTHOPAEDIC SURGERY

## 2019-01-21 PROCEDURE — 97535 SELF CARE MNGMENT TRAINING: CPT | Performed by: OCCUPATIONAL THERAPIST

## 2019-01-21 PROCEDURE — 73030 X-RAY EXAM OF SHOULDER: CPT

## 2019-01-21 PROCEDURE — 25010000002 FENTANYL CITRATE (PF) 100 MCG/2ML SOLUTION: Performed by: ANESTHESIOLOGY

## 2019-01-21 PROCEDURE — 0LS30ZZ REPOSITION RIGHT UPPER ARM TENDON, OPEN APPROACH: ICD-10-PCS | Performed by: ORTHOPAEDIC SURGERY

## 2019-01-21 PROCEDURE — 25010000002 ROPIVACINE HCL-NACL: Performed by: ANESTHESIOLOGY

## 2019-01-21 PROCEDURE — 25010000002 PHENYLEPHRINE PER 1 ML: Performed by: NURSE ANESTHETIST, CERTIFIED REGISTERED

## 2019-01-21 PROCEDURE — 97166 OT EVAL MOD COMPLEX 45 MIN: CPT | Performed by: OCCUPATIONAL THERAPIST

## 2019-01-21 PROCEDURE — 99221 1ST HOSP IP/OBS SF/LOW 40: CPT | Performed by: NURSE PRACTITIONER

## 2019-01-21 PROCEDURE — 25010000002 PROPOFOL 10 MG/ML EMULSION: Performed by: NURSE ANESTHETIST, CERTIFIED REGISTERED

## 2019-01-21 PROCEDURE — 25010000002 CEFAZOLIN PER 500 MG: Performed by: ORTHOPAEDIC SURGERY

## 2019-01-21 PROCEDURE — 97110 THERAPEUTIC EXERCISES: CPT | Performed by: OCCUPATIONAL THERAPIST

## 2019-01-21 DEVICE — TOTL SHLDR AUG PLT ARTHROPLASTY UPCHRG: Type: IMPLANTABLE DEVICE | Site: SHOULDER | Status: FUNCTIONAL

## 2019-01-21 DEVICE — PLT GLEN JNT EQUINOXE AUG POST 8DEG RT: Type: IMPLANTABLE DEVICE | Site: SHOULDER | Status: FUNCTIONAL

## 2019-01-21 DEVICE — SCRW COMPR EQUINOXE LK 4.5X26MM: Type: IMPLANTABLE DEVICE | Site: SHOULDER | Status: FUNCTIONAL

## 2019-01-21 DEVICE — TRY HUM EQUINOXE ADPT REV SHLDR PLS0: Type: IMPLANTABLE DEVICE | Site: SHOULDER | Status: FUNCTIONAL

## 2019-01-21 DEVICE — IMPLANTABLE DEVICE: Type: IMPLANTABLE DEVICE | Site: SHOULDER | Status: FUNCTIONAL

## 2019-01-21 DEVICE — SCRW LK EQUINOXE GLENOSPHERE REV/SHLDR: Type: IMPLANTABLE DEVICE | Site: SHOULDER | Status: FUNCTIONAL

## 2019-01-21 DEVICE — LINER HUM EQUINOXE REV SHLDR 38 PLS0: Type: IMPLANTABLE DEVICE | Site: SHOULDER | Status: FUNCTIONAL

## 2019-01-21 DEVICE — SCRW EQUINOXE TORQ DEFINE REV SHLDR KT: Type: IMPLANTABLE DEVICE | Site: SHOULDER | Status: FUNCTIONAL

## 2019-01-21 DEVICE — GLENOSPHERE SHLDR/REV EQUINOXE 38MM: Type: IMPLANTABLE DEVICE | Site: SHOULDER | Status: FUNCTIONAL

## 2019-01-21 DEVICE — SUT FW #2 W/TPR NDL 1/2 CIR 38IN 97CM 26.5MM BLU: Type: IMPLANTABLE DEVICE | Site: SHOULDER | Status: FUNCTIONAL

## 2019-01-21 DEVICE — SCRW COMPR EQUINOXE LK 4.5X30MM: Type: IMPLANTABLE DEVICE | Site: SHOULDER | Status: FUNCTIONAL

## 2019-01-21 DEVICE — STEM HUM PRI EQUINOXE PRESSFIT 9MM: Type: IMPLANTABLE DEVICE | Site: SHOULDER | Status: FUNCTIONAL

## 2019-01-21 DEVICE — SCRW COMPR EQUINOXE LK 4.5X34MM: Type: IMPLANTABLE DEVICE | Site: SHOULDER | Status: FUNCTIONAL

## 2019-01-21 RX ORDER — PREGABALIN 75 MG/1
75 CAPSULE ORAL ONCE
Status: COMPLETED | OUTPATIENT
Start: 2019-01-21 | End: 2019-01-21

## 2019-01-21 RX ORDER — NALOXONE HCL 0.4 MG/ML
0.1 VIAL (ML) INJECTION
Status: DISCONTINUED | OUTPATIENT
Start: 2019-01-21 | End: 2019-01-24 | Stop reason: HOSPADM

## 2019-01-21 RX ORDER — DIGOXIN 125 MCG
125 TABLET ORAL
Status: DISCONTINUED | OUTPATIENT
Start: 2019-01-22 | End: 2019-01-24 | Stop reason: HOSPADM

## 2019-01-21 RX ORDER — GLYCOPYRROLATE 0.2 MG/ML
INJECTION INTRAMUSCULAR; INTRAVENOUS AS NEEDED
Status: DISCONTINUED | OUTPATIENT
Start: 2019-01-21 | End: 2019-01-21 | Stop reason: SURG

## 2019-01-21 RX ORDER — FENOFIBRATE 145 MG/1
145 TABLET, COATED ORAL DAILY
Status: DISCONTINUED | OUTPATIENT
Start: 2019-01-21 | End: 2019-01-24 | Stop reason: HOSPADM

## 2019-01-21 RX ORDER — PROPOFOL 10 MG/ML
VIAL (ML) INTRAVENOUS CONTINUOUS PRN
Status: DISCONTINUED | OUTPATIENT
Start: 2019-01-21 | End: 2019-01-21 | Stop reason: SURG

## 2019-01-21 RX ORDER — NICOTINE POLACRILEX 4 MG
15 LOZENGE BUCCAL
Status: DISCONTINUED | OUTPATIENT
Start: 2019-01-21 | End: 2019-01-24 | Stop reason: HOSPADM

## 2019-01-21 RX ORDER — DEXAMETHASONE SODIUM PHOSPHATE 4 MG/ML
INJECTION, SOLUTION INTRA-ARTICULAR; INTRALESIONAL; INTRAMUSCULAR; INTRAVENOUS; SOFT TISSUE AS NEEDED
Status: DISCONTINUED | OUTPATIENT
Start: 2019-01-21 | End: 2019-01-21 | Stop reason: SURG

## 2019-01-21 RX ORDER — MAGNESIUM SULFATE HEPTAHYDRATE 40 MG/ML
4 INJECTION, SOLUTION INTRAVENOUS AS NEEDED
Status: DISCONTINUED | OUTPATIENT
Start: 2019-01-21 | End: 2019-01-24 | Stop reason: HOSPADM

## 2019-01-21 RX ORDER — POTASSIUM CHLORIDE 750 MG/1
40 CAPSULE, EXTENDED RELEASE ORAL AS NEEDED
Status: DISCONTINUED | OUTPATIENT
Start: 2019-01-21 | End: 2019-01-24 | Stop reason: HOSPADM

## 2019-01-21 RX ORDER — LISINOPRIL 40 MG/1
40 TABLET ORAL DAILY
Status: DISCONTINUED | OUTPATIENT
Start: 2019-01-21 | End: 2019-01-24 | Stop reason: HOSPADM

## 2019-01-21 RX ORDER — HYDROMORPHONE HYDROCHLORIDE 1 MG/ML
0.5 INJECTION, SOLUTION INTRAMUSCULAR; INTRAVENOUS; SUBCUTANEOUS
Status: DISCONTINUED | OUTPATIENT
Start: 2019-01-21 | End: 2019-01-24 | Stop reason: HOSPADM

## 2019-01-21 RX ORDER — SODIUM CHLORIDE, SODIUM LACTATE, POTASSIUM CHLORIDE, CALCIUM CHLORIDE 600; 310; 30; 20 MG/100ML; MG/100ML; MG/100ML; MG/100ML
9 INJECTION, SOLUTION INTRAVENOUS CONTINUOUS
Status: DISCONTINUED | OUTPATIENT
Start: 2019-01-21 | End: 2019-01-24 | Stop reason: HOSPADM

## 2019-01-21 RX ORDER — ONDANSETRON 2 MG/ML
4 INJECTION INTRAMUSCULAR; INTRAVENOUS EVERY 6 HOURS PRN
Status: DISCONTINUED | OUTPATIENT
Start: 2019-01-21 | End: 2019-01-24 | Stop reason: HOSPADM

## 2019-01-21 RX ORDER — LIDOCAINE HYDROCHLORIDE 10 MG/ML
INJECTION, SOLUTION INFILTRATION; PERINEURAL AS NEEDED
Status: DISCONTINUED | OUTPATIENT
Start: 2019-01-21 | End: 2019-01-21 | Stop reason: SURG

## 2019-01-21 RX ORDER — BISACODYL 5 MG/1
10 TABLET, DELAYED RELEASE ORAL DAILY PRN
Status: DISCONTINUED | OUTPATIENT
Start: 2019-01-21 | End: 2019-01-24 | Stop reason: HOSPADM

## 2019-01-21 RX ORDER — BISACODYL 10 MG
10 SUPPOSITORY, RECTAL RECTAL DAILY PRN
Status: DISCONTINUED | OUTPATIENT
Start: 2019-01-21 | End: 2019-01-24 | Stop reason: HOSPADM

## 2019-01-21 RX ORDER — SODIUM CHLORIDE, SODIUM LACTATE, POTASSIUM CHLORIDE, CALCIUM CHLORIDE 600; 310; 30; 20 MG/100ML; MG/100ML; MG/100ML; MG/100ML
INJECTION, SOLUTION INTRAVENOUS CONTINUOUS PRN
Status: DISCONTINUED | OUTPATIENT
Start: 2019-01-21 | End: 2019-01-21 | Stop reason: SURG

## 2019-01-21 RX ORDER — ACETAMINOPHEN 500 MG
1000 TABLET ORAL ONCE
Status: COMPLETED | OUTPATIENT
Start: 2019-01-21 | End: 2019-01-21

## 2019-01-21 RX ORDER — FUROSEMIDE 40 MG/1
40 TABLET ORAL DAILY
Status: DISCONTINUED | OUTPATIENT
Start: 2019-01-21 | End: 2019-01-24 | Stop reason: HOSPADM

## 2019-01-21 RX ORDER — FENTANYL CITRATE 50 UG/ML
50 INJECTION, SOLUTION INTRAMUSCULAR; INTRAVENOUS
Status: DISCONTINUED | OUTPATIENT
Start: 2019-01-21 | End: 2019-01-21 | Stop reason: HOSPADM

## 2019-01-21 RX ORDER — HYDROCODONE BITARTRATE AND ACETAMINOPHEN 10; 325 MG/1; MG/1
1 TABLET ORAL EVERY 4 HOURS PRN
Status: DISCONTINUED | OUTPATIENT
Start: 2019-01-21 | End: 2019-01-24 | Stop reason: HOSPADM

## 2019-01-21 RX ORDER — MAGNESIUM HYDROXIDE 1200 MG/15ML
LIQUID ORAL AS NEEDED
Status: DISCONTINUED | OUTPATIENT
Start: 2019-01-21 | End: 2019-01-21 | Stop reason: HOSPADM

## 2019-01-21 RX ORDER — BUSPIRONE HYDROCHLORIDE 10 MG/1
10 TABLET ORAL 3 TIMES DAILY
Status: DISCONTINUED | OUTPATIENT
Start: 2019-01-21 | End: 2019-01-24 | Stop reason: HOSPADM

## 2019-01-21 RX ORDER — VENLAFAXINE HYDROCHLORIDE 75 MG/1
150 CAPSULE, EXTENDED RELEASE ORAL DAILY
Status: DISCONTINUED | OUTPATIENT
Start: 2019-01-22 | End: 2019-01-24 | Stop reason: HOSPADM

## 2019-01-21 RX ORDER — CEFAZOLIN SODIUM 2 G/100ML
2 INJECTION, SOLUTION INTRAVENOUS ONCE
Status: COMPLETED | OUTPATIENT
Start: 2019-01-21 | End: 2019-01-21

## 2019-01-21 RX ORDER — ACETAMINOPHEN 650 MG/1
650 SUPPOSITORY RECTAL EVERY 4 HOURS PRN
Status: DISCONTINUED | OUTPATIENT
Start: 2019-01-21 | End: 2019-01-24 | Stop reason: HOSPADM

## 2019-01-21 RX ORDER — DEXTROSE MONOHYDRATE 25 G/50ML
25 INJECTION, SOLUTION INTRAVENOUS
Status: DISCONTINUED | OUTPATIENT
Start: 2019-01-21 | End: 2019-01-24 | Stop reason: HOSPADM

## 2019-01-21 RX ORDER — MAGNESIUM SULFATE HEPTAHYDRATE 40 MG/ML
2 INJECTION, SOLUTION INTRAVENOUS AS NEEDED
Status: DISCONTINUED | OUTPATIENT
Start: 2019-01-21 | End: 2019-01-24 | Stop reason: HOSPADM

## 2019-01-21 RX ORDER — MULTIPLE VITAMINS W/ MINERALS TAB 9MG-400MCG
1 TAB ORAL DAILY
Status: DISCONTINUED | OUTPATIENT
Start: 2019-01-21 | End: 2019-01-24 | Stop reason: HOSPADM

## 2019-01-21 RX ORDER — BUPIVACAINE HYDROCHLORIDE 2.5 MG/ML
INJECTION, SOLUTION EPIDURAL; INFILTRATION; INTRACAUDAL
Status: COMPLETED | OUTPATIENT
Start: 2019-01-21 | End: 2019-01-21

## 2019-01-21 RX ORDER — CALCIUM CARBONATE 200(500)MG
2 TABLET,CHEWABLE ORAL 2 TIMES DAILY PRN
Status: DISCONTINUED | OUTPATIENT
Start: 2019-01-21 | End: 2019-01-24 | Stop reason: HOSPADM

## 2019-01-21 RX ORDER — POTASSIUM CHLORIDE 1.5 G/1.77G
40 POWDER, FOR SOLUTION ORAL AS NEEDED
Status: DISCONTINUED | OUTPATIENT
Start: 2019-01-21 | End: 2019-01-24 | Stop reason: HOSPADM

## 2019-01-21 RX ORDER — FAMOTIDINE 20 MG/1
20 TABLET, FILM COATED ORAL ONCE
Status: COMPLETED | OUTPATIENT
Start: 2019-01-21 | End: 2019-01-21

## 2019-01-21 RX ORDER — POTASSIUM CHLORIDE 7.45 MG/ML
10 INJECTION INTRAVENOUS
Status: DISCONTINUED | OUTPATIENT
Start: 2019-01-21 | End: 2019-01-24 | Stop reason: HOSPADM

## 2019-01-21 RX ORDER — ATORVASTATIN CALCIUM 20 MG/1
20 TABLET, FILM COATED ORAL DAILY
Status: DISCONTINUED | OUTPATIENT
Start: 2019-01-22 | End: 2019-01-24 | Stop reason: HOSPADM

## 2019-01-21 RX ORDER — ONDANSETRON 2 MG/ML
INJECTION INTRAMUSCULAR; INTRAVENOUS AS NEEDED
Status: DISCONTINUED | OUTPATIENT
Start: 2019-01-21 | End: 2019-01-21 | Stop reason: SURG

## 2019-01-21 RX ORDER — ATRACURIUM BESYLATE 10 MG/ML
INJECTION, SOLUTION INTRAVENOUS AS NEEDED
Status: DISCONTINUED | OUTPATIENT
Start: 2019-01-21 | End: 2019-01-21 | Stop reason: SURG

## 2019-01-21 RX ORDER — FENTANYL CITRATE 50 UG/ML
INJECTION, SOLUTION INTRAMUSCULAR; INTRAVENOUS
Status: COMPLETED | OUTPATIENT
Start: 2019-01-21 | End: 2019-01-21

## 2019-01-21 RX ORDER — CARVEDILOL 12.5 MG/1
25 TABLET ORAL 2 TIMES DAILY WITH MEALS
Status: DISCONTINUED | OUTPATIENT
Start: 2019-01-21 | End: 2019-01-24 | Stop reason: HOSPADM

## 2019-01-21 RX ORDER — ONDANSETRON 4 MG/1
4 TABLET, FILM COATED ORAL EVERY 6 HOURS PRN
Status: DISCONTINUED | OUTPATIENT
Start: 2019-01-21 | End: 2019-01-24 | Stop reason: HOSPADM

## 2019-01-21 RX ORDER — PROPOFOL 10 MG/ML
VIAL (ML) INTRAVENOUS AS NEEDED
Status: DISCONTINUED | OUTPATIENT
Start: 2019-01-21 | End: 2019-01-21 | Stop reason: SURG

## 2019-01-21 RX ORDER — DOCUSATE SODIUM 100 MG/1
100 CAPSULE, LIQUID FILLED ORAL 2 TIMES DAILY PRN
Status: DISCONTINUED | OUTPATIENT
Start: 2019-01-21 | End: 2019-01-24 | Stop reason: HOSPADM

## 2019-01-21 RX ORDER — LIDOCAINE HYDROCHLORIDE 10 MG/ML
0.5 INJECTION, SOLUTION EPIDURAL; INFILTRATION; INTRACAUDAL; PERINEURAL ONCE AS NEEDED
Status: DISCONTINUED | OUTPATIENT
Start: 2019-01-21 | End: 2019-01-21 | Stop reason: HOSPADM

## 2019-01-21 RX ORDER — NEOSTIGMINE METHYLSULFATE 5 MG/5 ML
SYRINGE (ML) INTRAVENOUS AS NEEDED
Status: DISCONTINUED | OUTPATIENT
Start: 2019-01-21 | End: 2019-01-21 | Stop reason: SURG

## 2019-01-21 RX ORDER — SODIUM CHLORIDE 450 MG/100ML
50 INJECTION, SOLUTION INTRAVENOUS CONTINUOUS
Status: DISCONTINUED | OUTPATIENT
Start: 2019-01-21 | End: 2019-01-24 | Stop reason: HOSPADM

## 2019-01-21 RX ORDER — ONDANSETRON 2 MG/ML
4 INJECTION INTRAMUSCULAR; INTRAVENOUS ONCE AS NEEDED
Status: DISCONTINUED | OUTPATIENT
Start: 2019-01-21 | End: 2019-01-21 | Stop reason: HOSPADM

## 2019-01-21 RX ORDER — ACETAMINOPHEN 325 MG/1
650 TABLET ORAL EVERY 4 HOURS PRN
Status: DISCONTINUED | OUTPATIENT
Start: 2019-01-21 | End: 2019-01-24 | Stop reason: HOSPADM

## 2019-01-21 RX ORDER — SENNA AND DOCUSATE SODIUM 50; 8.6 MG/1; MG/1
2 TABLET, FILM COATED ORAL 2 TIMES DAILY
Status: DISCONTINUED | OUTPATIENT
Start: 2019-01-21 | End: 2019-01-24 | Stop reason: HOSPADM

## 2019-01-21 RX ADMIN — PHENYLEPHRINE HYDROCHLORIDE 0.5 MCG/KG/MIN: 10 INJECTION INTRAVENOUS at 08:22

## 2019-01-21 RX ADMIN — FENTANYL CITRATE 50 MCG: 50 INJECTION, SOLUTION INTRAMUSCULAR; INTRAVENOUS at 10:13

## 2019-01-21 RX ADMIN — PHENYLEPHRINE HYDROCHLORIDE 100 MCG: 10 INJECTION INTRAVENOUS at 08:20

## 2019-01-21 RX ADMIN — VANCOMYCIN HYDROCHLORIDE 1250 MG: 10 INJECTION, POWDER, LYOPHILIZED, FOR SOLUTION INTRAVENOUS at 07:28

## 2019-01-21 RX ADMIN — FAMOTIDINE 20 MG: 20 TABLET ORAL at 06:41

## 2019-01-21 RX ADMIN — VANCOMYCIN HYDROCHLORIDE 1250 MG: 10 INJECTION, POWDER, LYOPHILIZED, FOR SOLUTION INTRAVENOUS at 20:22

## 2019-01-21 RX ADMIN — CEFAZOLIN SODIUM 2 G: 10 INJECTION, POWDER, FOR SOLUTION INTRAVENOUS at 07:16

## 2019-01-21 RX ADMIN — PHENYLEPHRINE HYDROCHLORIDE 100 MCG: 10 INJECTION INTRAVENOUS at 08:14

## 2019-01-21 RX ADMIN — ONDANSETRON 4 MG: 2 INJECTION INTRAMUSCULAR; INTRAVENOUS at 08:59

## 2019-01-21 RX ADMIN — ROPIVACAINE HYDROCHLORIDE 6 ML/HR: 5 INJECTION, SOLUTION EPIDURAL; INFILTRATION; PERINEURAL at 09:27

## 2019-01-21 RX ADMIN — Medication 3 MG: at 09:00

## 2019-01-21 RX ADMIN — BUSPIRONE HYDROCHLORIDE 10 MG: 10 TABLET ORAL at 15:00

## 2019-01-21 RX ADMIN — PROPOFOL 150 MG: 10 INJECTION, EMULSION INTRAVENOUS at 07:56

## 2019-01-21 RX ADMIN — BUPIVACAINE HYDROCHLORIDE 15 ML: 2.5 INJECTION, SOLUTION EPIDURAL; INFILTRATION; INTRACAUDAL; PERINEURAL at 07:31

## 2019-01-21 RX ADMIN — LIDOCAINE HYDROCHLORIDE 50 MG: 10 INJECTION, SOLUTION INFILTRATION; PERINEURAL at 07:56

## 2019-01-21 RX ADMIN — MULTIPLE VITAMINS W/ MINERALS TAB 1 TABLET: TAB ORAL at 14:55

## 2019-01-21 RX ADMIN — HYDROCODONE BITARTRATE AND ACETAMINOPHEN 1 TABLET: 10; 325 TABLET ORAL at 23:29

## 2019-01-21 RX ADMIN — BUSPIRONE HYDROCHLORIDE 10 MG: 10 TABLET ORAL at 20:21

## 2019-01-21 RX ADMIN — FENOFIBRATE 145 MG: 145 TABLET ORAL at 15:57

## 2019-01-21 RX ADMIN — DEXAMETHASONE SODIUM PHOSPHATE 4 MG: 4 INJECTION, SOLUTION INTRAMUSCULAR; INTRAVENOUS at 08:10

## 2019-01-21 RX ADMIN — PREGABALIN 75 MG: 75 CAPSULE ORAL at 06:41

## 2019-01-21 RX ADMIN — ACETAMINOPHEN 1000 MG: 500 TABLET ORAL at 06:41

## 2019-01-21 RX ADMIN — LISINOPRIL 40 MG: 40 TABLET ORAL at 14:55

## 2019-01-21 RX ADMIN — FUROSEMIDE 40 MG: 40 TABLET ORAL at 14:55

## 2019-01-21 RX ADMIN — INSULIN LISPRO 4 UNITS: 100 INJECTION, SOLUTION INTRAVENOUS; SUBCUTANEOUS at 20:39

## 2019-01-21 RX ADMIN — GLYCOPYRROLATE 0.4 MG: 0.2 INJECTION, SOLUTION INTRAMUSCULAR; INTRAVENOUS at 09:00

## 2019-01-21 RX ADMIN — SODIUM CHLORIDE, POTASSIUM CHLORIDE, SODIUM LACTATE AND CALCIUM CHLORIDE: 600; 310; 30; 20 INJECTION, SOLUTION INTRAVENOUS at 07:52

## 2019-01-21 RX ADMIN — CARVEDILOL 25 MG: 12.5 TABLET, FILM COATED ORAL at 18:14

## 2019-01-21 RX ADMIN — PROPOFOL 144 MCG/KG/MIN: 10 INJECTION, EMULSION INTRAVENOUS at 07:58

## 2019-01-21 RX ADMIN — INSULIN LISPRO 5 UNITS: 100 INJECTION, SOLUTION INTRAVENOUS; SUBCUTANEOUS at 18:18

## 2019-01-21 RX ADMIN — ATRACURIUM BESYLATE 50 MG: 10 INJECTION, SOLUTION INTRAVENOUS at 07:56

## 2019-01-21 RX ADMIN — SENNOSIDES AND DOCUSATE SODIUM 2 TABLET: 8.6; 5 TABLET ORAL at 20:21

## 2019-01-21 RX ADMIN — PHENYLEPHRINE HYDROCHLORIDE 100 MCG: 10 INJECTION INTRAVENOUS at 08:16

## 2019-01-21 RX ADMIN — FENTANYL CITRATE 100 MCG: 50 INJECTION, SOLUTION INTRAMUSCULAR; INTRAVENOUS at 07:31

## 2019-01-21 RX ADMIN — SODIUM CHLORIDE 50 ML/HR: 4.5 INJECTION, SOLUTION INTRAVENOUS at 09:52

## 2019-01-21 NOTE — ANESTHESIA PROCEDURE NOTES
Peripheral Block    Pre-sedation assessment completed: 1/21/2019 7:15 AM    Patient location during procedure: pre-op  Start time: 1/21/2019 7:15 AM  Stop time: 1/21/2019 7:35 AM  Reason for block: at surgeon's request and post-op pain management  Performed by  Anesthesiologist: Misael Heaton MD  CRNA: Latoya Bazzi CRNA  Preanesthetic Checklist  Completed: patient identified, site marked, surgical consent, pre-op evaluation, timeout performed, IV checked, risks and benefits discussed and monitors and equipment checked  Prep:  Pt Position: left lateral decubitus  Sterile barriers:cap, gloves, mask and sterile barriers  Prep: ChloraPrep  Patient monitoring: blood pressure monitoring, continuous pulse oximetry and EKG  Procedure  Sedation:yes    Guidance:ultrasound guided  Images:still images obtained    Laterality:right  Block Type:interscalene  Injection Technique:catheter  Needle Type:Tuohy and echogenic  Needle Gauge:18 G    Catheter Size:20 G (20g)  Cath Depth at skin: 7 cm  Medications Used: fentaNYL citrate (PF) (SUBLIMAZE) injection, 100 mcg  bupivacaine PF (MARCAINE) 0.25 % injection, 15 mL  Med admintered at 1/21/2019 7:31 AM  Post Assessment  Injection Assessment: negative aspiration for heme, no paresthesia on injection and incremental injection  Patient Tolerance:comfortable throughout block  Complications:no  Additional Notes  Procedure:                 The pt was placed in semifowlers position with a slight tilt of the thorax contralateral to the insertion site.  The Insertion Site was prepped and draped in sterile fashion.  The pt was anesthetized with  IV Sedation( see meds) and  Skin and cutaneous tissue was infiltrated and anesthetized with 1% Lidocaine 3 mls via a 25g needle.  Utilizing ultrasound guidance, a BBraun 2 inch 18 g Contiplex echogenic touhy needle was advanced in-plane.  Hydro dissection of tissue was achieved with Normal saline. Major vessels(carotid and Internal Jugular) where  visualized as the brachial plexus was approached at the approximate level of C-7/ T-1.  Cervical 5 and Branches of Cervical 6 nerve roots where visualized and the needle tip was placed posterior at the level of C-6 roots.  LA spread was visualized and injection was made incrementally every 5 mls with aspiration. Injection pressure was normal or little, there was no intraneural injection, no vascular injection.      The BBraun 20 g wire stylet  catheter was then placed under US guidance on the posterior aspect of the Brachial Plexus.  Location of catheter was confirmed with NS injection visualized with US . The tuohy was then removed and the skin was sealed with Skin AFix at catheter insertion site.  Skin was prepped with mastisol and the labeled catheter  was secured with steristrips and a CHG tegaderm. Thank You.

## 2019-01-21 NOTE — ANESTHESIA POSTPROCEDURE EVALUATION
Patient: Taya Lara    Procedure Summary     Date:  01/21/19 Room / [facility]    Anesthesia Start:  0752 Anesthesia Stop:  0930    Procedure:  RIGHT TOTAL SHOULDER REVERSE ARTHROPLASTY, right biceps tenodesis (Right Shoulder) Diagnosis:       Primary osteoarthritis, right shoulder      Psoriatic arthritis (CMS/HCC)      Right bicipital tenosynovitis    Surgeon:  Carl Perez MD Provider:  Misael Heaton MD    Anesthesia Type:  general with block ASA Status:  3          Anesthesia Type: general with block  Last vitals  BP   165/78   Temp   97.0   Pulse   67   Resp   16   SpO2   98     Post Anesthesia Care and Evaluation    Patient location during evaluation: PACU  Patient participation: complete - patient participated  Level of consciousness: awake and alert  Pain score: 0  Pain management: adequate  Airway patency: patent  Anesthetic complications: No anesthetic complications  PONV Status: none  Cardiovascular status: hemodynamically stable and acceptable  Respiratory status: nonlabored ventilation, acceptable and nasal cannula  Hydration status: acceptable

## 2019-01-21 NOTE — ANESTHESIA PREPROCEDURE EVALUATION
Anesthesia Evaluation     Patient summary reviewed and Nursing notes reviewed   no history of anesthetic complications:  NPO Solid Status: > 8 hours  NPO Liquid Status: > 8 hours           Airway   Mallampati: II  TM distance: >3 FB  Neck ROM: full  No difficulty expected  Dental - normal exam     Pulmonary - negative pulmonary ROS and normal exam    breath sounds clear to auscultation  Cardiovascular - normal exam    ECG reviewed  Rhythm: regular  Rate: normal    (+) pacemaker ICD, pacemaker interrogated 1-3 months ago, hypertension,     ROS comment: EF 45%    Neuro/Psych- negative ROS  GI/Hepatic/Renal/Endo    (+) obesity,   diabetes mellitus type 2,     Musculoskeletal     Abdominal    Substance History      OB/GYN          Other   (+) arthritis                   Anesthesia Plan    ASA 3     general with block   (Right interscalene block/catheter with arrow pump for post-operative analgesia per request of Dr. Perez)  intravenous induction   Anesthetic plan, all risks, benefits, and alternatives have been provided, discussed and informed consent has been obtained with: patient.    Plan discussed with CRNA.

## 2019-01-21 NOTE — ANESTHESIA PROCEDURE NOTES
Airway  Urgency: elective    Airway not difficult    General Information and Staff    Patient location during procedure: OR  CRNA: Latoya Bazzi CRNA    Indications and Patient Condition  Indications for airway management: airway protection    Preoxygenated: yes  MILS not maintained throughout  Mask difficulty assessment: 1 - vent by mask    Final Airway Details  Final airway type: endotracheal airway      Successful airway: ETT  Cuffed: yes   Successful intubation technique: direct laryngoscopy  Facilitating devices/methods: intubating stylet  Endotracheal tube insertion site: oral  Blade size: 2  ETT size (mm): 7.0  Cormack-Lehane Classification: grade I - full view of glottis  Placement verified by: chest auscultation and capnometry   Measured from: lips  ETT to lips (cm): 20  Number of attempts at approach: 1    Additional Comments  Negative epigastric sounds, Breath sound equal bilaterally with symmetric chest rise and fall.  No teeth, atraumatic

## 2019-01-21 NOTE — PLAN OF CARE
Problem: Patient Care Overview  Goal: Plan of Care Review  Outcome: Ongoing (interventions implemented as appropriate)   01/21/19 1659   Plan of Care Review   Progress improving   OTHER   Outcome Summary pt arrived to the unit at aprox 1120 accompanied by SO and . pt deaf requires american sign langauage . Currently denies pain. Arrow pump set at 6ml/hr. Pt denies numbness or tingling. Dressing clean dry and intact. Has been able to ambulate to bathroom and in bull w/ Physical therapy. Able to void spontaneously. BP elevated managed with scheduled BP meds.    Coping/Psychosocial   Plan of Care Reviewed With patient       Problem: Shoulder Arthroplasty (Adult)  Goal: Signs and Symptoms of Listed Potential Problems Will be Absent, Minimized or Managed (Shoulder Arthroplasty)  Outcome: Ongoing (interventions implemented as appropriate)   01/21/19 1659   Goal/Outcome Evaluation   Problems Assessed (Shoulder Arthro) all   Problems Present (Shoulder Arthro) pain     Goal: Anesthesia/Sedation Recovery  Outcome: Ongoing (interventions implemented as appropriate)   01/21/19 1659   Goal/Outcome Evaluation   Anesthesia/Sedation Recovery recovered to baseline       Problem: Fall Risk (Adult)  Goal: Identify Related Risk Factors and Signs and Symptoms  Outcome: Ongoing (interventions implemented as appropriate)   01/21/19 1659   Fall Risk (Adult)   Related Risk Factors (Fall Risk) fatigue/slow reaction;gait/mobility problems     Goal: Absence of Fall  Outcome: Ongoing (interventions implemented as appropriate)   01/21/19 1659   Fall Risk (Adult)   Absence of Fall achieves outcome

## 2019-01-21 NOTE — PLAN OF CARE
Problem: Patient Care Overview  Goal: Plan of Care Review  Outcome: Ongoing (interventions implemented as appropriate)   01/21/19 3275   Plan of Care Review   Progress improving   OTHER   Outcome Summary Interscalene running at 6, pt had no c/o pain. She tolerated PROM , IR chest/ER 30, no family present. Pt passed mobility screen, please DC PT. Mild desaturation to 87% on RA, recovered to 90% with PLB. Pt desires DC home with assist from spouse and daughter. Plan to see pt with  and family at next session for teaching.    Coping/Psychosocial   Plan of Care Reviewed With patient       Problem: Shoulder Arthroplasty (Adult)  Goal: Signs and Symptoms of Listed Potential Problems Will be Absent, Minimized or Managed (Shoulder Arthroplasty)  Outcome: Ongoing (interventions implemented as appropriate)   01/21/19 2536   Goal/Outcome Evaluation   Problems Assessed (Shoulder Arthro) functional deficit   Problems Present (Shoulder Arthro) functional deficit

## 2019-01-21 NOTE — THERAPY EVALUATION
Acute Care - Occupational Therapy Initial Evaluation  [facility]     Patient Name: Taya Lara  : 1950  MRN: [id number]  Today's Date: 2019  Onset of Illness/Injury or Date of Surgery: 19  Date of Referral to OT: 19  Referring Physician: Dr. Perez    Admit Date: 2019       ICD-10-CM ICD-9-CM   1. Impaired mobility and ADLs Z74.09 799.89     Patient Active Problem List   Diagnosis   • Status post reverse arthroplasty of right shoulder   • Type 2 diabetes mellitus (CMS/HCC)   • Hypothyroidism (acquired)   • HLD (hyperlipidemia)   • Essential hypertension   • Bilateral deafness     Past Medical History:   Diagnosis Date   • Arthritis    • Cataract     unsure which eye, mild   • Deaf    • Diabetes mellitus (CMS/HCC)     doesnt check sugar often    • Disease of thyroid gland    • Heartburn     occasionally   • History of kidney stones    • Hyperlipidemia    • Hypertension    • Kidney disorder     saw doctor and states dr said (left?) kidney swollen but pt had no s/s- following up with nephro to check out later    • Pacemaker     [employer] - card on chart - home monitoring    • Psoriasis    • Wears dentures     upper    • Wears glasses      Past Surgical History:   Procedure Laterality Date   • CHOLECYSTECTOMY     • COLONOSCOPY     • ENDOSCOPY     • PACEMAKER IMPLANTATION     • TUBAL ABDOMINAL LIGATION            OT ASSESSMENT FLOWSHEET (last 72 hours)      Occupational Therapy Evaluation     Row Name 19 1339                   OT Evaluation Time/Intention    Subjective Information  no complaints  -AR        Document Type  evaluation  -AR        Mode of Treatment  occupational therapy  -AR        Patient Effort  excellent  -AR        Symptoms Noted During/After Treatment  shortness of breath;other (see comments) mild dyspnea, desaturation briefly to 87% on RA  -AR           General Information    Patient Profile Reviewed?  yes  -AR        Onset of Illness/Injury or Date of  Surgery  01/21/19  -AR        Referring Physician  Dr. Perez  -AR        Patient Observations  alert;cooperative;agree to therapy  -AR        Patient/Family Observations  pt supine,  at bedside. SO not present.   -AR        Prior Level of Function  independent:;all household mobility;community mobility;gait;transfer;min assist:;ADL's;home management;cooking;cleaning  -AR        Equipment Currently Used at Home  none  -AR        Pertinent History of Current Functional Problem  Pt is a 68 yof admitted for surgical management of progressive right shoulder pain and dysfunction that failed to improve with conservative measures. Pt is POD#0 right RTSA and right biceps tenodesis. OT orders indicate PROM right shoulder FE no limits, IR chest/ER 3, AROM elbow/wrist/hand and scapular retractions. Pt is right-hand dominant. Prior to admission, she was unable to raise RUE overhead, making ADLs and home management difficult. Sleep was interrupted by pain. Of note, pt is deaf and  present for evaluation.    -AR        Existing Precautions/Restrictions  fall;right;shoulder;non-weight bearing;other (see comments)  (Significant)  interscalene, pt is deaf-need   -AR        Limitations/Impairments  insensate body part;hearing  -AR        Risks Reviewed  patient:;LOB;nausea/vomiting;dizziness;increased discomfort;change in vital signs;increased drainage;lines disloged  -AR        Benefits Reviewed  patient:;improve function;increase independence;increase strength;increase balance;decrease pain;decrease risk of DVT;increase knowledge  -AR        Barriers to Rehab  none identified  -AR           Relationship/Environment    Primary Source of Support/Comfort  significant other;child(monique)  -AR        Lives With  significant other;child(monique), adult  -AR           Resource/Environmental Concerns    Current Living Arrangements  home/apartment/condo  -AR        Resource/Environmental Concerns  none  -AR         Home Accessibility Concerns  --  -YM (r) AR (t)        Transportation Concerns  car, none  -AR           Cognitive Assessment/Interventions    Additional Documentation  Cognitive Assessment/Intervention (Group)  -AR           Cognitive Assessment/Intervention- PT/OT    Affect/Mental Status (Cognitive)  WNL  -AR        Orientation Status (Cognition)  oriented x 4  -AR        Follows Commands (Cognition)  WNL  -AR        Cognitive Function (Cognitive)  WNL  -AR        Safety Deficit (Cognitive)  mild deficit;safety precautions awareness;safety precautions follow-through/compliance  -AR        Personal Safety Interventions  fall prevention program maintained  -AR           Safety Issues, Functional Mobility    Safety Issues Affecting Function (Mobility)  safety precautions follow-through/compliance;safety precaution awareness  -AR        Impairments Affecting Function (Mobility)  range of motion (ROM);pain;sensation/sensory awareness;shortness of breath right shoulder precautions  -AR           Mobility Assessment/Treatment    Extremity Weight-bearing Status  right upper extremity  -AR        Right Upper Extremity (Weight-bearing Status)  non weight-bearing (NWB)  -AR           Bed Mobility Assessment/Treatment    Bed Mobility Assessment/Treatment  scooting/bridging;supine-sit  -AR        Scooting/Bridging [place] (Bed Mobility)  supervision;verbal cues  -AR        Supine-Sit [place] (Bed Mobility)  supervision;verbal cues  -AR        Bed Mobility, Safety Issues  decreased use of arms for pushing/pulling  -AR        Assistive Device (Bed Mobility)  bed rails;head of bed elevated  -AR        Comment (Bed Mobility)  Educated pt on importance of maintaining NWB RUE AAT, including bed mobility. Reviewed safe sleeping positions  -AR           Functional Mobility    Functional Mobility- Ind. Level  supervision required  -AR        Functional Mobility-Distance (Feet)  300  -AR        Functional Mobility- Comment  Pt  passed mobility screen with score with score of 19. Pt with mild desaturation to 87% on RA, improved to 90% with PLB in under 1 minute. RN aware.  -AR           Transfer Assessment/Treatment    Transfer Assessment/Treatment  sit-stand transfer;stand-sit transfer  -AR        Maintains Weight-bearing Status (Transfers)  able to maintain  -AR        Comment (Transfers)  Cues for chair approach   -AR           Sit-Stand Transfer    Sit-Stand Dixie (Transfers)  contact guard  -AR           Stand-Sit Transfer    Stand-Sit [place] (Transfers)  contact guard  -AR           ADL Assessment/Intervention    26344 - OT Self Care/Mgmt Minutes  33  -AR        BADL Assessment/Intervention  bathing;upper body dressing;feeding  -AR           Bathing Assessment/Intervention    Comment (Bathing)  Educated pt on right shoulder precautuions and right axilla care to maintain, as well as pt may not shower until ARROW pump has been DC  -AR           Upper Body Dressing Assessment/Training    Upper Body Dressing Dixie Level  doff;don;other (see comments);maximum assist (25% patient effort);kelsey/grey Purcell Ultra II sling  -AR        Assistive Devices (Upper Body Dressing)  one hand technique  -AR        Upper Body Dressing Position  supported sitting  -AR        Comment (Upper Body Dressing)  Educated pt on right shoulder precautions, ADL retraining to maintain, care of interscalene nerve catheter during ADLs to avoid dislodgement and sling management including application/wear schedule and proper fit.   -AR           Self-Feeding Assessment/Training    Dixie Level (Feeding)  liquids to mouth;supervision  -AR        Position (Self-Feeding)  supported sitting  -AR           BADL Safety/Performance    Impairments, BADL Safety/Performance  balance;pain;range of motion;sensory awareness;strength  -AR        Skilled BADL Treatment/Intervention  BADL process/adaptation training;compensatory training;alba/one-hand  technique  -AR        Progress in BADL Status  improvement noted  -AR           General ROM    GENERAL ROM COMMENTS  LUSACHIN Shoulder , remainder joints WFL; R elbow/wrist/hand WFL  -AR           MMT (Manual Muscle Testing)    General MMT Comments  LUE WFL, RUE deferred  -AR           Motor Assessment/Interventions    Additional Documentation  Balance (Group);Balance Interventions (Group);Fine Motor Testing & Training (Group);Therapeutic Exercise (Group);Therapeutic Exercise Interventions (Group)  -AR           Therapeutic Exercise    16759 - OT Therapeutic Exercise Minutes  12  -AR           Therapeutic Exercise    Upper Extremity Range of Motion (Therapeutic Exercise)  shoulder flexion/extension, right;shoulder internal/external rotation, right;elbow flexion/extension, right;forearm supination/pronation, right;wrist flexion/extension, right scapualr retractions- bilat  -AR        Hand (Therapeutic Exercise)  finger flexion/extension, right  -AR        Exercise Type (Therapeutic Exercise)  AROM (active range of motion);AAROM (active assistive range of motion);PROM (passive range of motion) AROM hand/wrist/scap AA FA/elbow PROM FE/IR/ER  -AR        Position (Therapeutic Exercise)  seated  -AR        Sets/Reps (Therapeutic Exercise)  1/10  -AR           Balance    Balance  static sitting balance;static standing balance  -AR           Static Sitting Balance    Level of [place] (Unsupported Sitting, Static Balance)  supervision  -AR        Sitting Position (Unsupported Sitting, Static Balance)  sitting in chair  -AR        Time Able to Maintain Position (Unsupported Sitting, Static Balance)  more than 5 minutes  -AR           Static Standing Balance    Level of [place] (Supported Standing, Static Balance)  contact guard assist  -AR        Time Able to Maintain Position (Supported Standing, Static Balance)  4 to 5 minutes  -AR           Fine Motor Testing & Training    Comment, Fine Motor Coordination  R  opposition intact, pt signing with R hand towards end of session  -AR           Sensory Assessment/Intervention    Sensory General Assessment  light touch sensation deficits identified  -AR        Additional Documentation  Vision Assessment/Intervention (Group)  -AR           Light Touch Sensation Assessment    Left Upper Extremity: Light Touch Sensation Assessment  intact  -AR        Right Upper Extremity: Light Touch Sensation Assessment  moderate impairment, 50 to 74% correct responses  -AR           Vision Assessment/Intervention    Visual Impairment/Limitations  WNL  -AR           Positioning and Restraints    Pre-Treatment Position  in bed  -AR        Post Treatment Position  chair  -AR        In Chair  notified nsg;reclined;call light within reach;encouraged to call for assist;exit alarm on;legs elevated  at bedside  -AR           Pain Assessment    Additional Documentation  Pain Scale: Numbers Pre/Post-Treatment (Group)  -AR           Pain Scale: Numbers Pre/Post-Treatment    Pain Scale: Numbers, Pretreatment  0/10 - no pain  -AR        Pain Scale: Numbers, Post-Treatment  0/10 - no pain  -AR           Orthotics & Prosthetics Management    Orthosis Location  upper extremity orthosis  -AR        Additional Documentation  Orthosis Location (Row)  -AR           Upper Extremity Orthosis Management    Type (Upper Extremity Orthosis)  [place] Ultra II sling  -AR        Functional Design (Upper Extremity Orthosis)  static orthosis  -AR        Therapeutic Indications (Upper Extremity Orthosis)  post-op positioning/protection;stabilization and support  -AR        Wearing Schedule (Upper Extremity Orthosis)  remove for exercise;remove for hygiene/bathing  -AR        Orthosis Training (Upper Extremity Orthosis)  patient;donning/doffing orthosis;orthosis adjustment;orthosis maintenance;purpose/goals of orthosis;sensory precautions;sleeping precautions;wearing schedule;requires cues;requires assistance  -AR         Compliance/Wearing Issues (Upper Extremity Orthosis)  patient/caregiver comprehend strategies  -AR           Wound 01/21/19 0825 Right shoulder incision    Wound - Properties Group Date first assessed: 01/21/19  -KS Time first assessed: 0825 -KS Side: Right  -KS Location: shoulder  -KS Type: incision  -KS       Plan of Care Review    Plan of Care Reviewed With  patient  -AR           Clinical Impression (OT)    Date of Referral to OT  01/21/19  -AR        OT Diagnosis  decreased independence with ADLs  -AR        Patient/Family Goals Statement (OT Eval)  return home  -AR        Criteria for Skilled Therapeutic Interventions Met (OT Eval)  yes;treatment indicated  -AR        Therapy Frequency (OT Eval)  daily  -AR        Care Plan Review (OT)  evaluation/treatment results reviewed;care plan/treatment goals reviewed;risks/benefits reviewed;current/potential barriers reviewed;patient/other agree to care plan  -AR        Anticipated Discharge Disposition (OT)  home with assist  -AR           Vital Signs    Pre SpO2 (%)  93  -AR        O2 Delivery Pre Treatment  room air  -AR        Intra SpO2 (%)  87  -AR        O2 Delivery Intra Treatment  room air  -AR        Post SpO2 (%)  91  -AR        O2 Delivery Post Treatment  room air  -AR        Pre Patient Position  Supine  -AR        Intra Patient Position  Standing  -AR        Post Patient Position  Sitting  -AR           OT Goals    Bed Mobility Goal Selection (OT)  bed mobility, OT goal 1  -AR        Transfer Goal Selection (OT)  transfer, OT goal 1  -AR        Dressing Goal Selection (OT)  dressing, OT goal 1  -AR        ROM Goal Selection (OT)  ROM, OT goal 1  -AR        Problem Specific Goal Selection (OT)  problem specific goal 1, OT  -AR        Additional Documentation  ROM Goal Selection (OT) (Row);Problem Specific Goal Selection (OT) (Row)  -AR           Bed Mobility Goal 1 (OT)    Activity/Assistive Device (Bed Mobility Goal 1, OT)  supine to sit;other (see  comments) whiole maintaining NWB RUE  -AR        Luck Level/Cues Needed (Bed Mobility Goal 1, OT)  verbal cues required;contact guard assist  -AR        Time Frame (Bed Mobility Goal 1, OT)  short term goal (STG);4 days  -AR        Progress/Outcomes (Bed Mobility Goal 1, OT)  goal met  -AR           Transfer Goal 1 (OT)    Activity/Assistive Device (Transfer Goal 1, OT)  sit-to-stand/stand-to-sit  -AR        Luck Level/Cues Needed (Transfer Goal 1, OT)  contact guard assist;verbal cues required  -AR        Time Frame (Transfer Goal 1, OT)  short term goal (STG);5 days  -AR        Progress/Outcome (Transfer Goal 1, OT)  goal met  -AR           Dressing Goal 1 (OT)    Activity/Assistive Device (Dressing Goal 1, OT)  other (see comments) Pt and So will don/doff RUE sling with supervision  -AR        Luck/Cues Needed (Dressing Goal 1, OT)  verbal cues required;contact guard assist  -AR        Time Frame (Dressing Goal 1, OT)  short term goal (STG);4 days  -AR        Progress/Outcome (Dressing Goal 1, OT)  goal ongoing  -AR           ROM Goal 1 (OT)    ROM Goal 1 (OT)  Pt and SO will complete RUE HEP within physician parameters with supervision  -AR        Time Frame (ROM Goal 1, OT)  short term goal (STG);4 days  -AR        Progress/Outcome (ROM Goal 1, OT)  goal ongoing  -AR           Living Environment    Home Accessibility  --  -AR          User Key  (r) = Recorded By, (t) = Taken By, (c) = Cosigned By    Initials Name Effective Dates    Ale Hyatt OT 06/22/15 -     Martha Aranda RN 06/16/16 -     Naomi Moe RN 06/25/18 -          Occupational Therapy Education     Title: PT OT SLP Therapies (Done)     Topic: Occupational Therapy (Done)     Point: ADL training (Done)     Description: Instruct learner(s) on proper safety adaptation and remediation techniques during self care or transfers.   Instruct in proper use of assistive devices.    Learning Progress Summary            Patient Christa, SACHIN,TB,D, NR,VU by AR at 1/21/2019  1:39 PM                   Point: Home exercise program (Done)     Description: Instruct learner(s) on appropriate technique for monitoring, assisting and/or progressing therapeutic exercises/activities.    Learning Progress Summary           Patient Christa, E,TB,D, NR,VU by AR at 1/21/2019  1:39 PM                   Point: Precautions (Done)     Description: Instruct learner(s) on prescribed precautions during self-care and functional transfers.    Learning Progress Summary           Patient Christa, E,TB,D, NR,VU by AR at 1/21/2019  1:39 PM                   Point: Body mechanics (Done)     Description: Instruct learner(s) on proper positioning and spine alignment during self-care, functional mobility activities and/or exercises.    Learning Progress Summary           Patient SACHIN Goodwin,TB,D, NR,VU by AR at 1/21/2019  1:39 PM                               User Key     Initials Effective Dates Name Provider Type Discipline    AR 06/22/15 -  Ale Sawyer, OT Occupational Therapist OT                  OT Recommendation and Plan  Outcome Summary/Treatment Plan (OT)  Anticipated Discharge Disposition (OT): home with assist  Therapy Frequency (OT Eval): daily  Plan of Care Review  Plan of Care Reviewed With: patient  Plan of Care Reviewed With: patient  Outcome Summary: Interscalene running at 6, pt had no c/o pain. She tolerated PROM , IR chest/ER 30, no family present. Pt passed mobility screen, please DC PT. Mild desaturation to 87% on RA, recovered to 90% with PLB. Pt desires DC home with assist from spouse and daughter. Plan to see pt with  and family at next session for teaching.     Outcome Measures     Row Name 01/21/19 3122             How much help from another is currently needed...    Putting on and taking off regular lower body clothing?  2  -AR      Bathing (including washing, rinsing, and drying)  2  -AR      Toileting (which  includes using toilet bed pan or urinal)  3  -AR      Putting on and taking off regular upper body clothing  2  -AR      Taking care of personal grooming (such as brushing teeth)  3  -AR      Eating meals  3  -AR      Score  15  -AR         Functional Assessment    Outcome Measure Options  AM-PAC 6 Clicks Daily Activity (OT)  -AR        User Key  (r) = Recorded By, (t) = Taken By, (c) = Cosigned By    Initials Name Provider Type    AR Ale Sawyer, OT Occupational Therapist          Time Calculation:   Time Calculation- OT     Row Name 01/21/19 1339             Time Calculation- OT    OT Start Time  1339  -AR      Total Timed Code Minutes- OT  45 minute(s)  -AR      OT Received On  01/21/19  -AR      OT Goal Re-Cert Due Date  01/31/19  -AR         Timed Charges    15433 - OT Therapeutic Exercise Minutes  12  -AR      89599 - OT Self Care/Mgmt Minutes  33  -AR        User Key  (r) = Recorded By, (t) = Taken By, (c) = Cosigned By    Initials Name Provider Type    AR Ale Sawyer, OT Occupational Therapist        Therapy Suggested Charges     Code   Minutes Charges    85708 (CPT®) Hc Ot Neuromusc Re Education Ea 15 Min      23275 (CPT®) Hc Ot Ther Proc Ea 15 Min 12 1    06581 (CPT®) Hc Ot Therapeutic Act Ea 15 Min      54102 (CPT®) Hc Ot Manual Therapy Ea 15 Min      73185 (CPT®) Hc Ot Iontophoresis Ea 15 Min      53102 (CPT®) Hc Ot Elec Stim Ea-Per 15 Min      65371 (CPT®) Hc Ot Ultrasound Ea 15 Min      25965 (CPT®) Hc Ot Self Care/Mgmt/Train Ea 15 Min 33 2    Total  45 3        Therapy Charges for Today     Code Description Service Date Service Provider Modifiers Qty    52315872521 HC OT THER PROC EA 15 MIN 1/21/2019 Ale Sawyer OT GO 1    47216295453 HC OT SELF CARE/MGMT/TRAIN EA 15 MIN 1/21/2019 Ale Sawyer OT GO 2    42187381248 HC OT EVAL MOD COMPLEXITY 4 1/21/2019 Ale Sawyer OT GO 1    02631116902 HC OT THER SUPP EA 15 MIN 1/21/2019 Ale Sawyer OT GO 3                Ale Sawyer, OT  1/21/2019

## 2019-01-21 NOTE — BRIEF OP NOTE
TOTAL SHOULDER REVERSE ARTHROPLASTY  Progress Note    Taya Lara  1/21/2019    Pre-op Diagnosis:   Primary osteoarthritis, right shoulder [M19.011]       Post-Op Diagnosis Codes:     * Primary osteoarthritis, right shoulder [M19.011]     * Psoriatic arthritis (CMS/[facility]) [L40.50]     * Right bicipital tenosynovitis [M75.21]    Procedure/CPT® Codes:  IA RECONSTR TOTAL SHOULDER IMPLANT [71168]  IA REPAIR BICEPS LONG TENDON [56836]    Procedure(s):  RIGHT TOTAL SHOULDER REVERSE ARTHROPLASTY, right biceps tenodesis    Surgeon(s):  Carl Perez MD Dittmer-Flemig, Alison J, MD    Anesthesia: General with Block    Staff:   Circulator: Martha Mcrae RN  Scrub Person: Maryse Douglas  Vendor Representative: Harjinder Mcneal  Nursing Assistant: Amanda Orellana  Assistant: Kely Smith PA    Estimated Blood Loss: 75 mL    Urine Voided: * No values recorded between 1/21/2019  7:52 AM and 1/21/2019  9:01 AM *    Specimens:                None      Drains:      Findings: per dictation    Complications: none      Carl Perez MD     Date: 1/21/2019  Time: 9:01 AM

## 2019-01-21 NOTE — CONSULTS
[facility] Medicine Services  CONSULT NOTE      Patient Name: Taya Lara  : 1950  MRN: [id number]    Primary Care Physician: Alley Finney MD  Provider requesting consultation: Carl Perez MD    Subjective   Subjective   Reason for Consultation:  Medical management for DM, hypothyroidism, HLD, HTN    HPI:  Taya Lara is a 68 y.o. female with PMH significant for DM, HTN, hypothyroidism who presented to [facility] on 2019 for total right shoulder reverse arthroplasty by Dr Perez.  Patient tolerated the procedure well and came to the [facility].  PT/OT currently working with the patient.  Hospital Medicine Service was consulted for medical management of patient's diabetes, hypothyroid and hypertension.    Review of Systems  Gen- No fevers, chills  CV- No chest pain, palpitations  Resp- No cough, dyspnea  GI- No N/V/D, abd pain  MS- +right under arm pain  Otherwise complete ROS reviewed and is negative except as mentioned in the HPI.    Past Medical History:   Diagnosis Date   • Arthritis    • Cataract     unsure which eye, mild   • Deaf    • Diabetes mellitus (CMS/HCC)     doesnt check sugar often    • Disease of thyroid gland    • Heartburn     occasionally   • History of kidney stones    • Hyperlipidemia    • Hypertension    • Kidney disorder     saw doctor and states dr said (left?) kidney swollen but pt had no s/s- following up with nephro to check out later    • Pacemaker     [employer] - card on chart - home monitoring    • Psoriasis    • Wears dentures     upper    • Wears glasses      Past Surgical History:   Procedure Laterality Date   • CHOLECYSTECTOMY     • COLONOSCOPY     • ENDOSCOPY     • PACEMAKER IMPLANTATION     • TUBAL ABDOMINAL LIGATION       Family History: family history is not on file. Otherwise pertinent FHx was reviewed and unremarkable.     Social History:  reports that she quit smoking about 15 years ago. Her smoking use included  cigarettes. She has a 19.00 pack-year smoking history. she has never used smokeless tobacco. She reports that she does not drink alcohol or use drugs.    Medications:  Medications Prior to Admission   Medication Sig Dispense Refill Last Dose   • Apremilast (OTEZLA PO) Take 1 capsule by mouth 2 (Two) Times a Day. Unsure if 20 or 30 mg   1/20/2019 at Unknown time   • betamethasone dipropionate (DIPROLENE) 0.05 % cream Apply 1 application topically to the appropriate area as directed 2 (Two) Times a Day.   1/20/2019 at Unknown time   • busPIRone (BUSPAR) 10 MG tablet Take 10 mg by mouth 3 (Three) Times a Day.   1/21/2019 at 0300   • carvedilol (COREG) 25 MG tablet Take 25 mg by mouth 2 (Two) Times a Day With Meals.   1/21/2019 at 0300   • Cholecalciferol (VITAMIN D3) 1000 units capsule Take 3 capsules by mouth Daily.   1/20/2019 at Unknown time   • digoxin (LANOXIN) 125 MCG tablet Take 125 mcg by mouth Daily.   1/21/2019 at 0300   • fenofibrate (TRICOR) 145 MG tablet Take 145 mg by mouth Daily.   1/20/2019 at Unknown time   • hydrocortisone 2.5 % cream Apply 1 application topically to the appropriate area as directed 2 (Two) Times a Day.   1/20/2019 at Unknown time   • levothyroxine (SYNTHROID, LEVOTHROID) 175 MCG tablet Take 175 mcg by mouth Daily.   1/21/2019 at 0300   • lisinopril (PRINIVIL,ZESTRIL) 40 MG tablet Take 40 mg by mouth Daily.   1/20/2019 at Unknown time   • metFORMIN (GLUCOPHAGE) 500 MG tablet Take  by mouth Take As Directed. 1 tablet in am and 2 tablets in evening   1/20/2019 at Unknown time   • Multiple Vitamins-Minerals (MULTIVITAMIN WITH MINERALS) tablet tablet Take 1 tablet by mouth Daily.   1/20/2019 at Unknown time   • pravastatin (PRAVACHOL) 80 MG tablet Take 80 mg by mouth Daily.   1/21/2019 at 0300   • venlafaxine XR (EFFEXOR-XR) 150 MG 24 hr capsule Take 150 mg by mouth Daily.   1/21/2019 at 0300   • aspirin 81 MG EC tablet Take 81 mg by mouth Daily.   1/16/2019   • furosemide (LASIX) 40 MG  tablet Take 40 mg by mouth Daily.   1/20/2019   • Omega-3 Fatty Acids (FISH OIL) 1000 MG capsule capsule Take 3,000 mg by mouth Daily With Breakfast.   1/20/2019       Current Facility-Administered Medications:   •  acetaminophen (TYLENOL) suppository 650 mg, 650 mg, Rectal, Q4H PRN, Usha Olivas APRN  •  acetaminophen (TYLENOL) tablet 650 mg, 650 mg, Oral, Q4H PRN, Usha Olivas APRN  •  [START ON 1/22/2019] atorvastatin (LIPITOR) tablet 20 mg, 20 mg, Oral, Daily, Usha Olivas APRN  •  bisacodyl (DULCOLAX) EC tablet 10 mg, 10 mg, Oral, Daily PRN, Carl Perez MD  •  bisacodyl (DULCOLAX) suppository 10 mg, 10 mg, Rectal, Daily PRN, Usha Olivas APRN  •  busPIRone (BUSPAR) tablet 10 mg, 10 mg, Oral, TID, Usha Olivas APRN  •  calcium carbonate (TUMS) chewable tablet 500 mg (200 mg elemental), 2 tablet, Oral, BID PRN, Usha Olivas APRN  •  carvedilol (COREG) tablet 25 mg, 25 mg, Oral, BID With Meals, Usha Olivas APRN  •  dextrose (D50W) 25 g/ 50mL Intravenous Solution 25 g, 25 g, Intravenous, Q15 Min PRN, Usha Olivas APRN  •  dextrose (GLUTOSE) oral gel 15 g, 15 g, Oral, Q15 Min PRN, Usha Olivas APRN  •  [START ON 1/22/2019] digoxin (LANOXIN) tablet 125 mcg, 125 mcg, Oral, Daily, Usha Olivas APRN  •  docusate sodium (COLACE) capsule 100 mg, 100 mg, Oral, BID PRN, Carl Perez MD  •  fenofibrate (TRICOR) tablet 145 mg, 145 mg, Oral, Daily, Usah Olivas APRN  •  furosemide (LASIX) tablet 40 mg, 40 mg, Oral, Daily, Usha Olivas APRN, 40 mg at 01/21/19 1455  •  glucagon (human recombinant) (GLUCAGEN DIAGNOSTIC) injection 1 mg, 1 mg, Subcutaneous, PRN, Usha Olivas APRN  •  HYDROcodone-acetaminophen (NORCO)  MG per tablet 1 tablet, 1 tablet, Oral, Q4H PRN, Carl Perez MD  •  HYDROmorphone (DILAUDID) injection 0.5 mg, 0.5 mg, Intravenous, Q2H PRN **AND** naloxone (NARCAN) injection 0.1 mg, 0.1 mg, Intravenous, Q5 Min PRN, Carl Perez MD  •  insulin  lispro (humaLOG) injection 0-7 Units, 0-7 Units, Subcutaneous, 4x Daily With Meals & Nightly, Usha Olivas APRN  •  lactated ringers infusion, 9 mL/hr, Intravenous, Continuous, Misael Heaton MD  •  [START ON 1/22/2019] levothyroxine (SYNTHROID, LEVOTHROID) tablet 175 mcg, 175 mcg, Oral, Q AM, Usha Olivas APRN  •  lisinopril (PRINIVIL,ZESTRIL) tablet 40 mg, 40 mg, Oral, Daily, Usha Olivas APRN, 40 mg at 01/21/19 1455  •  magnesium hydroxide (MILK OF MAGNESIA) suspension 2400 mg/10mL 10 mL, 10 mL, Oral, Daily PRN, Carl Perez MD  •  Magnesium Sulfate 2 gram Bolus, followed by 8 gram infusion (total Mg dose 10 grams)- Mg less than or equal to 1mg/dL, 2 g, Intravenous, PRN **OR** Magnesium Sulfate 2 gram / 50mL Infusion (GIVE X 3 BAGS TO EQUAL 6GM TOTAL DOSE) - Mg 1.1 - 1.5 mg/dl, 2 g, Intravenous, PRN **OR** Magnesium Sulfate 4 gram infusion- Mg 1.6-1.9 mg/dL, 4 g, Intravenous, PRN, Usha Olivas APRN  •  multivitamin with minerals 1 tablet, 1 tablet, Oral, Daily, Usha Olivas APRN, 1 tablet at 01/21/19 1455  •  ondansetron (ZOFRAN) tablet 4 mg, 4 mg, Oral, Q6H PRN **OR** ondansetron (ZOFRAN) injection 4 mg, 4 mg, Intravenous, Q6H PRN, Usha Olivas APRN  •  potassium chloride (MICRO-K) CR capsule 40 mEq, 40 mEq, Oral, PRN **OR** potassium chloride (KLOR-CON) packet 40 mEq, 40 mEq, Oral, PRN **OR** potassium chloride 10 mEq in 100 mL IVPB, 10 mEq, Intravenous, Q1H PRN, Usha Olivas APRN  •  ropivacaine (Naropin) 0.2% in NS infusion (ARROW Pump), 6 mL/hr, Peripheral Nerve, Continuous, Misael Heaton MD, Last Rate: 6 mL/hr at 01/21/19 0927, 6 mL/hr at 01/21/19 0927  •  sennosides-docusate sodium (SENOKOT-S) 8.6-50 MG tablet 2 tablet, 2 tablet, Oral, BID, Usha Olivas APRN  •  sodium chloride 0.45 % infusion, 50 mL/hr, Intravenous, Continuous, Carl Perez MD, Last Rate: 50 mL/hr at 01/21/19 0952, 50 mL/hr at 01/21/19 0952  •  vancomycin 1250 mg/250 mL 0.9% NS IVPB ([facility]), 15 mg/kg,  Intravenous, Once, Carl Perez MD  •  [START ON 1/22/2019] venlafaxine XR (EFFEXOR-XR) 24 hr capsule 150 mg, 150 mg, Oral, Daily, Usha Olivas APRN    No Known Allergies    Objective   Objective   Vital Signs:   Temp:  [97 °F (36.1 °C)-98.9 °F (37.2 °C)] 97.4 °F (36.3 °C)  Heart Rate:  [67-85] 85  Resp:  [16-18] 18  BP: (150-185)/() 150/77   Physical Exam  Constitutional: Alert, WN, WD female in NAD sitting up in a chair  Eyes: PERRLA, sclerae anicteric, no conjunctival injection  Head: NCAT  ENT: [place], moist mucous membranes  Neck: Supple, no thyromegaly, no lymphadenopathy, trachea midline  Respiratory: Clear to auscultation bilaterally, nonlabored respirations   Cardiovascular: RRR, no murmurs, rubs, or gallops, palpable pedal pulses bilaterally  Gastrointestinal: Positive bowel sounds, soft, nontender, nondistended  Musculoskeletal: YOO except right shoulder; in shoulder immobilizer; no LE edema  Psychiatric: Pleasant and cooperative; normal affect  Neurologic: Oriented x4, strength symmetric in all extremities, Cranial Nerves grossly intact to confrontation, speech clear  Skin: No rashes on exposed skin    Results Reviewed:  I have personally reviewed current lab, radiology, and data and agree.  Results from last 7 days   Lab Units 01/14/19  1635   WBC 10*3/mm3 10.24   HEMOGLOBIN g/dL 14.9   HEMATOCRIT % 44.7*   PLATELETS 10*3/mm3 318     Results from last 7 days   Lab Units 01/14/19  1635   SODIUM mmol/L 140   POTASSIUM mmol/L 4.4   CHLORIDE mmol/L 102   CO2 mmol/L 31.0   BUN mg/dL 19   CREATININE mg/dL 0.84   GLUCOSE mg/dL 98   CALCIUM mg/dL 11.2*     Estimated Creatinine Clearance: 65.8 mL/min (by C-G formula based on SCr of 0.84 mg/dL).  Brief Urine Lab Results     None        No results found for: BNP    Microbiology Results Abnormal     None        Imaging Results (last 24 hours)     Procedure Component Value Units Date/Time    XR Shoulder 2+ View Right [[id number]] Collected:  01/21/19  1054     Updated:  01/21/19 1227    Narrative:       EXAMINATION: XR SHOULDER 2+ VW RIGHT- 01/21/2019     INDICATION: postop      COMPARISON: 01/14/2019     FINDINGS: Status post right total shoulder arthroplasty without evidence  for complication in excellent anatomic alignment. Expected postsurgical  changes in the adjacent soft tissues including soft tissue emphysema.             Impression:       Status post total shoulder arthroplasty without evidence of  complication in excellent anatomic alignment.     D:  01/21/2019  E:  01/21/2019     This report was finalized on 1/21/2019 12:25 PM by Dr. Too Armendariz.           Assessment/Plan   Assessment / Plan     Active Hospital Problems    Diagnosis Date Noted   • **Status post reverse arthroplasty of right shoulder [Z96.611] 01/21/2019   • Type 2 diabetes mellitus (CMS/HCC) [E11.9] 01/21/2019   • Hypothyroidism (acquired) [E03.9] 01/21/2019   • HLD (hyperlipidemia) [E78.5] 01/21/2019   • Essential hypertension [I10] 01/21/2019   • Bilateral deafness [H91.93] 01/21/2019     Plan:  S/p reverse arthroplasty right shoulder  --Dr Perez    Diabetes  --A1c pending  --QACHS  --SSI    HTN  --home meds    HLD  --Home meds    Hypothyroid  --TSH pending  --Home dose levothyroxine    Consider restarting ASA when OK with Dr Perez    Thank you for allowing [facility] Medicine Service to provide consultative care for your patient, we will continue to follow while clinically appropriate.    Electronically signed by ASHLIE Lujan, 01/21/19, 1:58 PM.

## 2019-01-21 NOTE — OP NOTE
DATE OF OPERATION: 01/21/19  PREOPERATIVE DIAGNOSIS: Primary osteoarthritis, right shoulder [M19.011]  POSTOPERATIVE DIAGNOSES:  1. Primary osteoarthritis, right shoulder [M19.011]  2. Psoriatic arthritis  3. Biceps tenosynovitis.    PROCEDURES PERFORMED:  1. Right reverse total shoulder arthroplasty.    2. Right biceps tenodesis.    SURGEON: Calr Perez MD  ASSISTANTS:  1. Jo Ann Teresa MD, PGY-5  2. Kely Smith PA-C, medically necessary.    ANESTHESIA: General plus block.    ESTIMATED BLOOD LOSS:75mL.    COMPLICATIONS: None.    DISPOSITION: Recovery room in stable condition.    IMPLANTS: [employer] Equinoxe reverse total shoulder system, 9 mm stem press-fit, 0 metal liner tray, 38 x 0 polyethylene tray, posterior augmented baseplate with 3 screws with locking caps, and a 38mm glenosphere.    INDICATIONS: This is a 68-year-old female with right shoulder pain and limited function and motion secondary to primary arthritis as well as likely psoriatic arthritis. They have failed conservative treatment and after a discussion of risks, benefits, and alternatives, wished to proceed with shoulder arthroplasty.  DESCRIPTION OF PROCEDURE: On the day of surgery, the patient identified the right shoulder as the correct operative extremity. This was initialed by the surgeon with the patients's acknowledgment. The patient underwent placement of an interscalene block and was taken to the operating room and placed in the supine position. Upon induction of adequate anesthesia, the patient was brought up to the beach chair position and the shoulder and upper extremity were prepped and draped in the usual sterile fashion. Timeout confirmed the correct patient and operative extremity as well as that antibiotics were on board. A standard deltopectoral approach to the shoulder was carried out. It was carried sharply through the skin and subcutaneous tissue. Medial and lateral flaps were developed over the deltopectoral fascia. The  cephalic vein was identified and mobilized laterally with the deltoid. The subdeltoid and subpectoral spaces were mobilized and a blunt retractor was placed deep to this. The clavipectoral fascia was opened on the lateral edge of the conjoined tendon and the retractor was moved deep to this. The leading edge of the pectoralis was released exposing the long head of the biceps. This was tenosynovitic. It was tenodesed to the pectoralis and released proximal to this. The 3 sisters were identified and coagulated. A subscapularis tenotomy was performed 1 cm medial to the lesser tuberosity and rotator interval was released to the glenoid exposing the humeral head. The inferior capsule was released directly off the humerus to allow greater than 90° of external rotation. In rotating the arm the supraspinatus began to deteriorate and peel off. The anatomic neck was exposed and the humeral head osteotomy was performed in approximately 25° of retroversion. The remainder of the osteophytes were removed. The canal was then entered, reamed, and broached. The final stem impacted in in approximately 25° of retroversion. A head protector was placed. The humerus was subluxed posteriorly. Given the poor rotator cuff tissue as well as the inflammatory arthritis component, the decision was made to perform a reverse total shoulder. The glenoid was exposed. Circumferential labral excision and capsular release were performed. A 270° mobilization of the subscapularis was carried out as well.  A centering hole was drilled using the posterior guide. The glenoid was gently reamed in line with the wear and then the large central hole for the baseplate was drilled and the cage glenoid baseplate impacted in.  Next, the inferior screw hole was drilled and a screw placed with excellent purchase.  Next, an rosemarie-inferior screw was placed, then a postero-inferior screw, both with exceptional purchase.  The glenosphere was then inserted and locked  into place with a set screw.  The humerus was carefully subluxed back anteriorly. A liner tray and polyethylene were placed and trialing was carried out. The appropriate final sizes were chosen and locked into place.  The shoulder was then reduced.  This allowed nearly full passive range of motion with no instability. The joint was copiously irrigated with orthopedic irrigation mixed with Betadine after the final implants were assembled and locked into place.  Passive range range of motion will be full elevation but external rotation will be limited to 30° in the perioperative period. The deltopectoral interval was approximated with 0 Vicryl, the subcutaneous tissue with 2-0 Vicryl, and the skin with Monocryl and Dermabond. A sterile dressing was placed. Anesthesia was reversed and the patient was taken to the recovery room in stable condition. All instrument, needle, and sponge counts were correct.      Carl Perez MD*

## 2019-01-21 NOTE — INTERVAL H&P NOTE
Pre-Op H&P (See Recent Office Note Attached for Full H&P)    Chief complaint: Right shoulder pain     HPI:    Office note dated 12/21/18 reviewed.  Patient is a 68 y.o. female who presents with primary osteoarthritis of the right shoulder with arthritis associated with psoriasis.  She has complaints of right shoulder pain and decreased range of motion limiting activities of daily living. She describes the pain as mild in severity, severe at times, for approximately 3 years.  She has attempted physical therapy for symptom relief which did not help relieve her symptoms.  The pain now wakes her up at night.    She presents today for a right total shoulder reverse arthroplasty.    Review of Systems:  General ROS:  no fever, chills, rashes, No change since last office visit  Cardiovascular ROS: no chest pain or dyspnea on exertion  Respiratory ROS: no cough, shortness of breath, or wheezing      Meds:    No current facility-administered medications on file prior to encounter.      Current Outpatient Medications on File Prior to Encounter   Medication Sig Dispense Refill   • Apremilast (OTEZLA PO) Take 1 capsule by mouth 2 (Two) Times a Day. Unsure if 20 or 30 mg     • busPIRone (BUSPAR) 10 MG tablet Take 10 mg by mouth 3 (Three) Times a Day.     • carvedilol (COREG) 25 MG tablet Take 25 mg by mouth 2 (Two) Times a Day With Meals.     • digoxin (LANOXIN) 125 MCG tablet Take 125 mcg by mouth Daily.     • fenofibrate (TRICOR) 145 MG tablet Take 145 mg by mouth Daily.     • levothyroxine (SYNTHROID, LEVOTHROID) 175 MCG tablet Take 175 mcg by mouth Daily.     • lisinopril (PRINIVIL,ZESTRIL) 40 MG tablet Take 40 mg by mouth Daily.     • metFORMIN (GLUCOPHAGE) 500 MG tablet Take  by mouth Take As Directed. 1 tablet in am and 2 tablets in evening     • Multiple Vitamins-Minerals (MULTIVITAMIN WITH MINERALS) tablet tablet Take 1 tablet by mouth Daily.     • pravastatin (PRAVACHOL) 80 MG tablet Take 80 mg by mouth Daily.     •  venlafaxine XR (EFFEXOR-XR) 150 MG 24 hr capsule Take 150 mg by mouth Daily.     • aspirin 81 MG EC tablet Take 81 mg by mouth Daily.     • furosemide (LASIX) 40 MG tablet Take 40 mg by mouth Daily.     • meloxicam (MOBIC) 7.5 MG tablet Take 1 tablet by mouth Daily. 15 tablet 0   • Omega-3 Fatty Acids (FISH OIL) 1000 MG capsule capsule Take 3,000 mg by mouth Daily With Breakfast.         Vital Signs:  BP (!) 185/109 (BP Location: Right arm, Patient Position: Sitting)   Pulse 74   Temp 98.9 °F (37.2 °C) (Temporal)   Resp 18   SpO2 96%   Breastfeeding? No     Physical Exam:    CV:  S1S2 regular rate and paced rhythm, no murmur               Resp:  Clear to auscultation; respirations regular, even and unlabored    Results Review:    I reviewed the patient's new clinical results.      Assessment:  Primary osteoarthritis of the right shoulder with arthritis associated with psoriasis.    Plan:  Right total shoulder reverse arthroplasty    Paris Kohler PA-C  1/21/2019   6:42 AM

## 2019-01-22 LAB
ANION GAP SERPL CALCULATED.3IONS-SCNC: 6 MMOL/L (ref 3–11)
BASOPHILS # BLD AUTO: 0.02 10*3/MM3 (ref 0–0.2)
BASOPHILS NFR BLD AUTO: 0.2 % (ref 0–1)
BUN BLD-MCNC: 15 MG/DL (ref 9–23)
BUN/CREAT SERPL: 20.3 (ref 7–25)
CALCIUM SPEC-SCNC: 9.3 MG/DL (ref 8.7–10.4)
CHLORIDE SERPL-SCNC: 105 MMOL/L (ref 99–109)
CO2 SERPL-SCNC: 28 MMOL/L (ref 20–31)
CREAT BLD-MCNC: 0.74 MG/DL (ref 0.6–1.3)
DEPRECATED RDW RBC AUTO: 46.7 FL (ref 37–54)
EOSINOPHIL # BLD AUTO: 0.02 10*3/MM3 (ref 0–0.3)
EOSINOPHIL NFR BLD AUTO: 0.2 % (ref 0–3)
ERYTHROCYTE [DISTWIDTH] IN BLOOD BY AUTOMATED COUNT: 13.5 % (ref 11.3–14.5)
GFR SERPL CREATININE-BSD FRML MDRD: 78 ML/MIN/1.73
GLUCOSE BLD-MCNC: 173 MG/DL (ref 70–100)
GLUCOSE BLDC GLUCOMTR-MCNC: 180 MG/DL (ref 70–130)
GLUCOSE BLDC GLUCOMTR-MCNC: 183 MG/DL (ref 70–130)
GLUCOSE BLDC GLUCOMTR-MCNC: 206 MG/DL (ref 70–130)
GLUCOSE BLDC GLUCOMTR-MCNC: 210 MG/DL (ref 70–130)
HCT VFR BLD AUTO: 37.2 % (ref 34.5–44)
HGB BLD-MCNC: 12.3 G/DL (ref 11.5–15.5)
IMM GRANULOCYTES # BLD AUTO: 0.02 10*3/MM3 (ref 0–0.03)
IMM GRANULOCYTES NFR BLD AUTO: 0.2 % (ref 0–0.6)
LYMPHOCYTES # BLD AUTO: 1.82 10*3/MM3 (ref 0.6–4.8)
LYMPHOCYTES NFR BLD AUTO: 17 % (ref 24–44)
MCH RBC QN AUTO: 31.1 PG (ref 27–31)
MCHC RBC AUTO-ENTMCNC: 33.1 G/DL (ref 32–36)
MCV RBC AUTO: 93.9 FL (ref 80–99)
MONOCYTES # BLD AUTO: 1.37 10*3/MM3 (ref 0–1)
MONOCYTES NFR BLD AUTO: 12.8 % (ref 0–12)
NEUTROPHILS # BLD AUTO: 7.44 10*3/MM3 (ref 1.5–8.3)
NEUTROPHILS NFR BLD AUTO: 69.6 % (ref 41–71)
PLATELET # BLD AUTO: 266 10*3/MM3 (ref 150–450)
PMV BLD AUTO: 11.8 FL (ref 6–12)
POTASSIUM BLD-SCNC: 3.5 MMOL/L (ref 3.5–5.5)
RBC # BLD AUTO: 3.96 10*6/MM3 (ref 3.89–5.14)
SODIUM BLD-SCNC: 139 MMOL/L (ref 132–146)
TSH SERPL DL<=0.05 MIU/L-ACNC: 0.88 MIU/ML (ref 0.35–5.35)
WBC NRBC COR # BLD: 10.69 10*3/MM3 (ref 3.5–10.8)

## 2019-01-22 PROCEDURE — 85025 COMPLETE CBC W/AUTO DIFF WBC: CPT | Performed by: ORTHOPAEDIC SURGERY

## 2019-01-22 PROCEDURE — 99239 HOSP IP/OBS DSCHRG MGMT >30: CPT | Performed by: NURSE PRACTITIONER

## 2019-01-22 PROCEDURE — 97535 SELF CARE MNGMENT TRAINING: CPT | Performed by: OCCUPATIONAL THERAPIST

## 2019-01-22 PROCEDURE — 63710000001 INSULIN LISPRO (HUMAN) PER 5 UNITS: Performed by: NURSE PRACTITIONER

## 2019-01-22 PROCEDURE — 84443 ASSAY THYROID STIM HORMONE: CPT | Performed by: NURSE PRACTITIONER

## 2019-01-22 PROCEDURE — 97110 THERAPEUTIC EXERCISES: CPT | Performed by: OCCUPATIONAL THERAPIST

## 2019-01-22 PROCEDURE — 80048 BASIC METABOLIC PNL TOTAL CA: CPT | Performed by: ORTHOPAEDIC SURGERY

## 2019-01-22 PROCEDURE — 82962 GLUCOSE BLOOD TEST: CPT

## 2019-01-22 RX ORDER — HYDROCODONE BITARTRATE AND ACETAMINOPHEN 10; 325 MG/1; MG/1
1 TABLET ORAL EVERY 4 HOURS PRN
Qty: 30 TABLET | Refills: 0 | Status: SHIPPED | OUTPATIENT
Start: 2019-01-22 | End: 2019-01-31

## 2019-01-22 RX ADMIN — POTASSIUM CHLORIDE 40 MEQ: 750 CAPSULE, EXTENDED RELEASE ORAL at 08:32

## 2019-01-22 RX ADMIN — ATORVASTATIN CALCIUM 20 MG: 20 TABLET, FILM COATED ORAL at 08:34

## 2019-01-22 RX ADMIN — LISINOPRIL 40 MG: 40 TABLET ORAL at 08:33

## 2019-01-22 RX ADMIN — HYDROCODONE BITARTRATE AND ACETAMINOPHEN 1 TABLET: 10; 325 TABLET ORAL at 14:20

## 2019-01-22 RX ADMIN — DIGOXIN 125 MCG: 125 TABLET ORAL at 12:35

## 2019-01-22 RX ADMIN — FUROSEMIDE 40 MG: 40 TABLET ORAL at 08:38

## 2019-01-22 RX ADMIN — INSULIN LISPRO 3 UNITS: 100 INJECTION, SOLUTION INTRAVENOUS; SUBCUTANEOUS at 21:51

## 2019-01-22 RX ADMIN — BUSPIRONE HYDROCHLORIDE 10 MG: 10 TABLET ORAL at 08:33

## 2019-01-22 RX ADMIN — LEVOTHYROXINE SODIUM 175 MCG: 125 TABLET ORAL at 05:39

## 2019-01-22 RX ADMIN — HYDROCODONE BITARTRATE AND ACETAMINOPHEN 1 TABLET: 10; 325 TABLET ORAL at 18:41

## 2019-01-22 RX ADMIN — MULTIPLE VITAMINS W/ MINERALS TAB 1 TABLET: TAB ORAL at 08:38

## 2019-01-22 RX ADMIN — VENLAFAXINE HYDROCHLORIDE 150 MG: 75 CAPSULE, EXTENDED RELEASE ORAL at 08:33

## 2019-01-22 RX ADMIN — SENNOSIDES AND DOCUSATE SODIUM 2 TABLET: 8.6; 5 TABLET ORAL at 19:51

## 2019-01-22 RX ADMIN — CARVEDILOL 25 MG: 12.5 TABLET, FILM COATED ORAL at 08:34

## 2019-01-22 RX ADMIN — BUSPIRONE HYDROCHLORIDE 10 MG: 10 TABLET ORAL at 17:48

## 2019-01-22 RX ADMIN — SENNOSIDES AND DOCUSATE SODIUM 2 TABLET: 8.6; 5 TABLET ORAL at 08:34

## 2019-01-22 RX ADMIN — INSULIN LISPRO 3 UNITS: 100 INJECTION, SOLUTION INTRAVENOUS; SUBCUTANEOUS at 12:35

## 2019-01-22 RX ADMIN — INSULIN LISPRO 2 UNITS: 100 INJECTION, SOLUTION INTRAVENOUS; SUBCUTANEOUS at 17:48

## 2019-01-22 RX ADMIN — CARVEDILOL 25 MG: 12.5 TABLET, FILM COATED ORAL at 17:48

## 2019-01-22 RX ADMIN — FENOFIBRATE 145 MG: 145 TABLET ORAL at 08:33

## 2019-01-22 RX ADMIN — INSULIN LISPRO 2 UNITS: 100 INJECTION, SOLUTION INTRAVENOUS; SUBCUTANEOUS at 08:32

## 2019-01-22 RX ADMIN — HYDROCODONE BITARTRATE AND ACETAMINOPHEN 1 TABLET: 10; 325 TABLET ORAL at 06:51

## 2019-01-22 NOTE — THERAPY TREATMENT NOTE
Acute Care - Occupational Therapy Treatment Note  [facility]     Patient Name: Taya Lara  : 1950  MRN: [id number]  Today's Date: 2019  Onset of Illness/Injury or Date of Surgery: 19  Date of Referral to OT: 19  Referring Physician: Dr. Perez    Admit Date: 2019       ICD-10-CM ICD-9-CM   1. Impaired mobility and ADLs Z74.09 799.89     Patient Active Problem List   Diagnosis   • Status post reverse arthroplasty of right shoulder   • Type 2 diabetes mellitus (CMS/HCC)   • Hypothyroidism (acquired)   • HLD (hyperlipidemia)   • Essential hypertension   • Bilateral deafness     Past Medical History:   Diagnosis Date   • Arthritis    • Cataract     unsure which eye, mild   • Deaf    • Diabetes mellitus (CMS/HCC)     doesnt check sugar often    • Disease of thyroid gland    • Heartburn     occasionally   • History of kidney stones    • Hyperlipidemia    • Hypertension    • Kidney disorder     saw doctor and states dr said (left?) kidney swollen but pt had no s/s- following up with nephro to check out later    • Pacemaker     [place] [employer] - card on chart - home monitoring    • Psoriasis    • Wears dentures     upper    • Wears glasses      Past Surgical History:   Procedure Laterality Date   • CHOLECYSTECTOMY     • COLONOSCOPY     • ENDOSCOPY     • PACEMAKER IMPLANTATION     • TOTAL SHOULDER ARTHROPLASTY W/ DISTAL CLAVICLE EXCISION Right 2019    Procedure: TOTAL SHOULDER REVERSE ARTHROPLASTY WITH BICEP TENODESIS RIGHT;  Surgeon: Carl Perez MD;  Location: [facility];  Service: Orthopedics   • TUBAL ABDOMINAL LIGATION         Therapy Treatment    Rehabilitation Treatment Summary     Row Name 19 1045             Treatment Time/Intention    Discipline  occupational therapist  -AR      Document Type  therapy note (daily note) POD#1  -AR      Subjective Information  no complaints  -AR      Mode of Treatment  occupational therapy  -AR      Patient/Family Observations   pt in chair, So at bedside as well as   -AR      Patient Effort  good  -AR      Comment  limited with desaturation  -AR      Existing Precautions/Restrictions  fall;right;shoulder;non-weight bearing  (Significant)  [place], interscalene, significant desaturation, pt deaf  -AR      Treatment Considerations/Comments  pt deaf- see with    (Significant)   -AR      Recorded by [AR] Ale Sawyer, OT 01/22/19 1800      Row Name 01/22/19 1045             Vital Signs    Pre SpO2 (%)  91  -AR      O2 Delivery Pre Treatment  supplemental O2  -AR      Intra SpO2 (%)  75  -AR      O2 Delivery Intra Treatment  room air  -AR      Post SpO2 (%)  91  -AR      O2 Delivery Post Treatment  supplemental O2  -AR      Pre Patient Position  Sitting  -AR      Intra Patient Position  Standing  -AR      Post Patient Position  Sitting  -AR      Recorded by [AR] Ale Sawyer OT 01/22/19 1800      Row Name 01/22/19 1045             Cognitive Assessment/Intervention    Additional Documentation  Cognitive Assessment/Intervention (Group)  -AR      Recorded by [AR] Ale Sawyer OT 01/22/19 1800      Row Name 01/22/19 1045             Cognitive Assessment/Intervention- PT/OT    Affect/Mental Status (Cognitive)  WNL  -AR      Orientation Status (Cognition)  oriented x 4  -AR      Follows Commands (Cognition)  WNL  -AR      Cognitive Function (Cognitive)  WNL  -AR      Safety Deficit (Cognitive)  mild deficit;safety precautions awareness;safety precautions follow-through/compliance  -AR      Personal Safety Interventions  fall prevention program maintained  -AR      Recorded by [AR] Ale Sawyer OT 01/22/19 1800      Row Name 01/22/19 1045             Safety Issues, Functional Mobility    Safety Issues Affecting Function (Mobility)  safety precaution awareness;safety precautions follow-through/compliance  -AR      Impairments Affecting Function (Mobility)  endurance/activity tolerance;shortness of  breath;pain;range of motion (ROM);sensation/sensory awareness right shoulder precautions  -AR      Recorded by [AR] Ale Sawyer, OT 01/22/19 1800      Row Name 01/22/19 1045             Mobility Assessment/Intervention    Extremity Weight-bearing Status  right upper extremity  -AR      Right Upper Extremity (Weight-bearing Status)  non weight-bearing (NWB)  -AR      Recorded by [AR] Ale Sawyer, OT 01/22/19 1800      Row Name 01/22/19 1045             Bed Mobility Assessment/Treatment    Comment (Bed Mobility)  Reviewed with pt and SO importance of maintaining NWB RUE AAT, including bed mobility, reviewed safe sleeping positions  -AR      Recorded by [AR] Ale Sawyer, SARINA 01/22/19 1800      Row Name 01/22/19 1045             Functional Mobility    Functional Mobility- Ind. Level  supervision required  -AR      Functional Mobility-Distance (Feet)  15  -AR      Functional Mobility- Comment  Ambulated to and from restroom after nurse had removed oxygen to see how pt would saturate on RA as she was scheduled to DC home. Pt with no dyspnea, however desaturation to 75% on RA. RN notified and placed pt back on oxygen and decreased nerve catheter from 6 to 4.   -AR      Recorded by [AR] Ale Sawyer, OT 01/22/19 1800      Row Name 01/22/19 1045             Transfer Assessment/Treatment    Transfer Assessment/Treatment  sit-stand transfer;stand-sit transfer;toilet transfer  -AR      Maintains Weight-bearing Status (Transfers)  able to maintain  -AR      Comment (Transfers)  cues to atttend to lcoation of ARROW pump  -AR      Recorded by [AR] Ale Sawyer, OT 01/22/19 1800      Row Name 01/22/19 1045             Sit-Stand Transfer    Sit-Stand [place] (Transfers)  supervision  -AR      Recorded by [AR] Ale Sawyer OT 01/22/19 1800      Row Name 01/22/19 1045             Stand-Sit Transfer    Stand-Sit [place] (Transfers)  supervision  -AR      Recorded by [AR] Digna  Ale Stout OT 01/22/19 1800      Row Name 01/22/19 1045             Toilet Transfer    Type (Toilet Transfer)  sit-stand;stand-sit  -AR      [place] Level (Toilet Transfer)  supervision  -AR      Assistive Device (Toilet Transfer)  raised toilet seat;grab bars/safety frame  -AR      Recorded by [AR] Ale Sawyer OT 01/22/19 1800      Row Name 01/22/19 1045             ADL Assessment/Intervention    55530 - OT Self Care/Mgmt Minutes  40  -AR      BADL Assessment/Intervention  bathing;upper body dressing;toileting;grooming;feeding  -AR      Recorded by [AR] Ale Sawyer OT 01/22/19 1800      Row Name 01/22/19 1045             Bathing Assessment/Intervention    Comment (Bathing)  Educated pt and SO on right shoulder precautions, right axilla care to maintain, that pt may not shower until interscalene has been DC and that Aquacel dressing is left in place until f/u visit.    -AR      Recorded by [AR] Ale Sawyer OT 01/22/19 1800      Row Name 01/22/19 1045             Upper Body Dressing Assessment/Training    Upper Body Dressing [place] Level  doff;don;other (see comments) RUE sling  -AR      Assistive Devices (Upper Body Dressing)  one hand technique  -AR      Upper Body Dressing Position  supported sitting  -AR      Comment (Upper Body Dressing)  OT educated them on rightg shoulder precautions, ADL retraining to maintain, care of interscalene to avoid dislodgement. Pt was concerned that SO would not be able to assist her in donning/doffing her sling due to coordination issues. OT spoke with Dr. Perez who gave permission for OT to remove body pillow from sling. OT removed pillow and educated them on sling application/wear schedule/proper fit. SO declined donning sling, however he was able to secure/unsecure straps with supervision.   -AR      Recorded by [AR] Ale Sawyer OT 01/22/19 1800      Row Name 01/22/19 1045             Grooming Assessment/Training    [place]  Level (Grooming)  wash face, hands;supervision  -AR      Grooming Position  unsupported standing  -AR      Recorded by [AR] Ale Sawyer OT 01/22/19 1800      Row Name 01/22/19 1045             Self-Feeding Assessment/Training    [place] Level (Feeding)  liquids to mouth;supervision  -AR      Position (Self-Feeding)  supported sitting  -AR      Recorded by [AR] Ale Sawyer OT 01/22/19 1800      Row Name 01/22/19 1045             Toileting Assessment/Training    [place] Level (Toileting)  toileting skills;supervision  -AR      Assistive Devices (Toileting)  raised toilet seat  -AR      Toileting Position  supported sitting  -AR      Recorded by [AR] Ale Sawyer OT 01/22/19 1800      Row Name 01/22/19 1045             BADL Safety/Performance    Impairments, BADL Safety/Performance  balance;pain;range of motion;sensory awareness;shortness of breath right shoulder precautions  -AR      Skilled BADL Treatment/Intervention  BADL process/adaptation training;compensatory training;alba/one-hand technique  -AR      Recorded by [AR] Ale Sawyer OT 01/22/19 1800      Row Name 01/22/19 1045             Therapeutic Exercise    48786 - OT Therapeutic Exercise Minutes  28  -AR      Recorded by [AR] Ale Sawyer OT 01/22/19 1800      Row Name 01/22/19 1045             Therapeutic Exercise    Upper Extremity Range of Motion (Therapeutic Exercise)  shoulder flexion/extension, right;elbow flexion/extension, right;forearm supination/pronation, right;wrist flexion/extension, right scapular retraction- bilat  -AR      Hand (Therapeutic Exercise)  finger flexion/extension, right  -AR      Exercise Type (Therapeutic Exercise)  AROM (active range of motion);AAROM (active assistive range of motion);PROM (passive range of motion) AROM scap/hand/wrist/FA AA elbow PROM FE/IR/ER  -AR      Position (Therapeutic Exercise)  seated  -AR      Sets/Reps (Therapeutic Exercise)  1/10  -AR      Comment  (Therapeutic Exercise)  Educated pt and SO on RUE HEP. Pt tolerated PROM right shoulder FE 90, IR -10/ER 10 with fair teachback from SO.   -AR      Recorded by [AR] Ale Sawyer, SARINA 01/22/19 1800      Row Name 01/22/19 1045             Static Sitting Balance    Level of [place] (Unsupported Sitting, Static Balance)  supervision  -AR      Sitting Position (Unsupported Sitting, Static Balance)  sitting in chair  -AR      Recorded by [AR] Ale Sawyer, OT 01/22/19 1800      Row Name 01/22/19 1045             Static Standing Balance    Level of [place] (Supported Standing, Static Balance)  supervision  -AR      Recorded by [AR] Ale Sawyer, SARINA 01/22/19 1800      Row Name 01/22/19 1045             Fine Motor Testing & Training    Comment, Fine Motor Coordination  R opposition intact  -AR      Recorded by [AR] Ale Sawyer, SARINA 01/22/19 1800      Row Name 01/22/19 1045             Positioning and Restraints    Pre-Treatment Position  sitting in chair/recliner  -AR      Post Treatment Position  chair  -AR      In Chair  reclined;call light within reach;encouraged to call for assist;exit alarm on;with family/caregiver;with nsg;legs elevated;with brace  -AR      Recorded by [AR] Ale Sawyer, SARINA 01/22/19 1800      Row Name 01/22/19 1045             Pain Scale: Numbers Pre/Post-Treatment    Pain Scale: Numbers, Pretreatment  0/10 - no pain  -AR      Pain Scale: Numbers, Post-Treatment  0/10 - no pain  -AR      Recorded by [AR] Ale aSwyer, OT 01/22/19 1800      Row Name 01/22/19 1045             Sensory Assessment/Intervention    Sensory General Assessment  light touch sensation deficits identified  -AR      Recorded by [AR] Ale Sawyer OT 01/22/19 1800      Row Name 01/22/19 1045             Light Touch Sensation Assessment    Left Upper Extremity: Light Touch Sensation Assessment  intact  -AR      Right Upper Extremity: Light Touch Sensation Assessment  mild  impairment, 75% or more correct responses  -AR      Recorded by [AR] Ale Sawyer OT 01/22/19 1800      Row Name 01/22/19 1045             Orthotic/Prosthetic Management    Orthosis Location  upper extremity orthosis  -AR      Recorded by [AR] Ale Sawyer OT 01/22/19 1800      Row Name 01/22/19 1045             Upper Extremity Orthosis Management    Type (Upper Extremity Orthosis)  [place] Ultra II sling  -AR      Functional Design (Upper Extremity Orthosis)  static orthosis  -AR      Therapeutic Indications (Upper Extremity Orthosis)  post-op positioning/protection;stabilization and support  -AR      Wearing Schedule (Upper Extremity Orthosis)  remove for exercise;remove for hygiene/bathing  -AR      Orthosis Training (Upper Extremity Orthosis)  patient and caregiver;activity limitations/precautions;donning/doffing orthosis;orthosis adjustment;orthosis maintenance;purpose/goals of orthosis;sensory precautions;skin inspection/precautions;sleeping precautions;wearing schedule;requires cues;requires assistance  -AR      Compliance/Wearing Issues (Upper Extremity Orthosis)  patient/caregiver comprehend strategies  -AR      Recorded by [AR] Ale Sawyer OT 01/22/19 1800      Row Name                Wound 01/21/19 0825 Right shoulder incision    Wound - Properties Group Date first assessed: 01/21/19 [KS] Time first assessed: 0825 [KS] Side: Right [KS] Location: shoulder [KS] Type: incision [KS] Recorded by:  [KS] Martha Mcrae RN 01/21/19 0825    Row Name 01/22/19 1045             Plan of Care Review    Plan of Care Reviewed With  patient;significant other  -AR      Recorded by [AR] Ale Sawyer OT 01/22/19 1800      Row Name 01/22/19 1045             Outcome Summary/Treatment Plan (OT)    Daily Summary of Progress (OT)  progress towards functional goals is fair  -AR      Anticipated Discharge Disposition (OT)  home with assist  -AR      Recorded by [AR] Ale Sawyer OT 01/22/19  1800        User Key  (r) = Recorded By, (t) = Taken By, (c) = Cosigned By    Initials Name Effective Dates Discipline    Ale Hyatt OT 06/22/15 -  OT    KS Martha Mcrae RN 06/16/16 -  Nurse        Wound 01/21/19 0855 Right shoulder incision (Active)   Dressing Appearance dry;intact;no drainage 1/22/2019  8:00 AM   Closure MIKAELA 1/22/2019  8:00 AM   Base dressing in place, unable to visualize 1/22/2019  8:00 AM   Drainage Amount none 1/22/2019  8:00 AM   Dressing Care, Wound hydrocolloid 1/21/2019  8:30 PM     Rehab Goal Summary     Row Name 01/22/19 1045             Bed Mobility Goal 1 (OT)    Progress/Outcomes (Bed Mobility Goal 1, OT)  goal met  -AR         Transfer Goal 1 (OT)    Progress/Outcome (Transfer Goal 1, OT)  goal met  -AR         Dressing Goal 1 (OT)    Progress/Outcome (Dressing Goal 1, OT)  goal ongoing  -AR         ROM Goal 1 (OT)    Progress/Outcome (ROM Goal 1, OT)  goal ongoing  -AR        User Key  (r) = Recorded By, (t) = Taken By, (c) = Cosigned By    Initials Name Provider Type Discipline    Ale Hyatt OT Occupational Therapist OT        Occupational Therapy Education     Title: PT OT SLP Therapies (In Progress)     Topic: Occupational Therapy (In Progress)     Point: ADL training (In Progress)     Description: Instruct learner(s) on proper safety adaptation and remediation techniques during self care or transfers.   Instruct in proper use of assistive devices.    Learning Progress Summary           Patient Acceptance, E,TB,D, NR by AR at 1/22/2019 10:45 AM    Eager, E,TB,D, NR,VU by AR at 1/21/2019  1:39 PM   Significant Other Acceptance, E,TB,D, NR by AR at 1/22/2019 10:45 AM                   Point: Home exercise program (In Progress)     Description: Instruct learner(s) on appropriate technique for monitoring, assisting and/or progressing therapeutic exercises/activities.    Learning Progress Summary           Patient Acceptance, E,TB,D, NR by AR at 1/22/2019  10:45 AM    Eager, E,TB,D, NR,VU by AR at 1/21/2019  1:39 PM   Significant Other Acceptance, E,TB,D, NR by AR at 1/22/2019 10:45 AM                   Point: Precautions (In Progress)     Description: Instruct learner(s) on prescribed precautions during self-care and functional transfers.    Learning Progress Summary           Patient Acceptance, E,TB,D, NR by AR at 1/22/2019 10:45 AM    Eager, E,TB,D, NR,VU by AR at 1/21/2019  1:39 PM   Significant Other Acceptance, E,TB,D, NR by AR at 1/22/2019 10:45 AM                   Point: Body mechanics (In Progress)     Description: Instruct learner(s) on proper positioning and spine alignment during self-care, functional mobility activities and/or exercises.    Learning Progress Summary           Patient Acceptance, E,TB,D, NR by AR at 1/22/2019 10:45 AM    Eager, E,TB,D, NR,VU by AR at 1/21/2019  1:39 PM   Significant Other Acceptance, E,TB,D, NR by AR at 1/22/2019 10:45 AM                               User Key     Initials Effective Dates Name Provider Type Discipline    AR 06/22/15 -  Ale Sawyer, OT Occupational Therapist OT                OT Recommendation and Plan  Outcome Summary/Treatment Plan (OT)  Daily Summary of Progress (OT): progress towards functional goals is fair  Anticipated Discharge Disposition (OT): home with assist  Therapy Frequency (OT Eval): daily  Daily Summary of Progress (OT): progress towards functional goals is fair  Plan of Care Review  Plan of Care Reviewed With: patient, significant other  Plan of Care Reviewed With: patient, significant other  Outcome Summary: Pt seen for family teaching. Pt limited with significant desaturation to 75% on RA- nurse aware and ambulation deferred. Interscalene was running at 6 start of session, decreased to 4 during. Pre/post pain level 0 at rest. She tolerated right shoulder PROM FE 90, IR -10/ER 10 with fair teachback from SO. He will benefit from further training on sling and HEP.   Outcome  Measures     Row Name 01/22/19 1045 01/21/19 1339          How much help from another is currently needed...    Putting on and taking off regular lower body clothing?  2  -AR  2  -AR     Bathing (including washing, rinsing, and drying)  3  -AR  2  -AR     Toileting (which includes using toilet bed pan or urinal)  3  -AR  3  -AR     Putting on and taking off regular upper body clothing  2  -AR  2  -AR     Taking care of personal grooming (such as brushing teeth)  3  -AR  3  -AR     Eating meals  3  -AR  3  -AR     Score  16  -AR  15  -AR        Functional Assessment    Outcome Measure Options  AM-PAC 6 Clicks Daily Activity (OT)  -AR  AM-PAC 6 Clicks Daily Activity (OT)  -AR       User Key  (r) = Recorded By, (t) = Taken By, (c) = Cosigned By    Initials Name Provider Type    Ale Hyatt OT Occupational Therapist           Time Calculation:   Time Calculation- OT     Row Name 01/22/19 1045             Time Calculation- OT    OT Start Time  1045  -AR      Total Timed Code Minutes- OT  68 minute(s)  -AR      OT Received On  01/22/19  -AR      OT Goal Re-Cert Due Date  01/31/19  -AR         Timed Charges    28845 - OT Therapeutic Exercise Minutes  28  -AR      57353 - OT Self Care/Mgmt Minutes  40  -AR        User Key  (r) = Recorded By, (t) = Taken By, (c) = Cosigned By    Initials Name Provider Type    Ale Hyatt OT Occupational Therapist           Therapy Suggested Charges     Code   Minutes Charges    73908 (CPT®) Hc Ot Neuromusc Re Education Ea 15 Min      97660 (CPT®) Hc Ot Ther Proc Ea 15 Min 28 2    39788 (CPT®) Hc Ot Therapeutic Act Ea 15 Min      85381 (CPT®) Hc Ot Manual Therapy Ea 15 Min      07727 (CPT®) Hc Ot Iontophoresis Ea 15 Min      41666 (CPT®) Hc Ot Elec Stim Ea-Per 15 Min      93728 (CPT®) Hc Ot Ultrasound Ea 15 Min      61578 (CPT®) Hc Ot Self Care/Mgmt/Train Ea 15 Min 40 3    Total  68 5        Therapy Charges for Today     Code Description Service Date Service Provider  Modifiers Qty    30732449116 HC OT THER PROC EA 15 MIN 1/21/2019 Ale Sawyer, OT GO 1    68870564814 HC OT SELF CARE/MGMT/TRAIN EA 15 MIN 1/21/2019 Ale Sawyer, OT GO 2    31444190938 HC OT EVAL MOD COMPLEXITY 4 1/21/2019 Ale Sawyer OT GO 1    71029429716 HC OT THER SUPP EA 15 MIN 1/21/2019 Ale Sawyer, OT GO 3    21906644367 HC OT THER PROC EA 15 MIN 1/22/2019 Ale Sawyer, OT GO 2    70211341827 HC OT SELF CARE/MGMT/TRAIN EA 15 MIN 1/22/2019 Ale Sawyer, OT GO 3               Ale Sawyer OT  1/22/2019

## 2019-01-22 NOTE — PLAN OF CARE
Problem: Patient Care Overview  Goal: Plan of Care Review  Outcome: Ongoing (interventions implemented as appropriate)   01/22/19 1715   Plan of Care Review   Progress improving   OTHER   Outcome Summary patient had anticipated to be discharged home today with family however O2 sats  remained between 87-89% on room air dropped to 85% w/ activity nerve block turned down however no change. anaesthesia notified pump turned off and ultrasound was obtained nerve cath had coiled and anaesthesia was unable to reinsert a new one. Pain has been managed with PO pain meds. O2 remains at 92% with 2 L NC will continue to ween pt off O2 possible discharge tomorrow if pain is managed and pt no longer requires O2. Aquacel dressing [place] dry and intact. Denies numbness or tingling on RUE. Moderate hand . Will continue to monitor    Coping/Psychosocial   Plan of Care Reviewed With patient       Problem: Shoulder Arthroplasty (Adult)  Goal: Signs and Symptoms of Listed Potential Problems Will be Absent, Minimized or Managed (Shoulder Arthroplasty)  Outcome: Ongoing (interventions implemented as appropriate)   01/22/19 1715   Goal/Outcome Evaluation   Problems Assessed (Shoulder Arthro) all   Problems Present (Shoulder Arthro) pain;respiratory compromise     Goal: Anesthesia/Sedation Recovery  Outcome: Ongoing (interventions implemented as appropriate)   01/22/19 1715   Goal/Outcome Evaluation   Anesthesia/Sedation Recovery progressing toward baseline       Problem: Fall Risk (Adult)  Goal: Identify Related Risk Factors and Signs and Symptoms  Outcome: Ongoing (interventions implemented as appropriate)   01/22/19 1715   Fall Risk (Adult)   Related Risk Factors (Fall Risk) gait/mobility problems;fatigue/slow reaction   Signs and Symptoms (Fall Risk) presence of risk factors     Goal: Absence of Fall  Outcome: Ongoing (interventions implemented as appropriate)   01/22/19 1715   Fall Risk (Adult)   Absence of Fall achieves outcome

## 2019-01-22 NOTE — PLAN OF CARE
Problem: Patient Care Overview  Goal: Plan of Care Review  Outcome: Ongoing (interventions implemented as appropriate)   01/21/19 2030 01/22/19 6273   Plan of Care Review   Progress --  improving   OTHER   Outcome Summary --  Patient rested well this shift. So far only one PRN PO administered for pain. Will continue to monitor. Patient is deaf- communication board at bedside.    Coping/Psychosocial   Plan of Care Reviewed With patient --

## 2019-01-22 NOTE — NURSING NOTE
Acute Pain Service:  Peripheral nerve catheter and disposable infusion device teaching completed with patient via .  Video demonstration, handout and bracelet provided with [facility] on call central phone number.  Instructed to text Latoya Bazzi CRNA (number provided to pt) with any questions or concerns.  Patient verbalized understanding.  Service will continue to follow until catheter DC'd.  Please contact patient via text at [phone] if needed.

## 2019-01-22 NOTE — PATIENT CARE CONFERENCE
OT encouraged pt to call rehab department post DC if she has issues, pt states she can only text. OT called [facility] and spoke with  Arcadio about options for pt to contact the hospital. Will to investigate and f/u.   Ale Sawyer OTR/L

## 2019-01-22 NOTE — PROGRESS NOTES
[facility]    Acute pain service Inpatient Progress Note    Patient Name: Taya Lara  :  1950  MRN:  [id number]        Acute Pain  Service Inpatient Progress Note:    Analgesia:Excellent  Pain Score:10  LOC: alert and awake  Resp Status: room air  Cardiac: VS stable  Side Effects:None  Catheter Site:clean, dressing intact and dry  Cath type: peripheral nerve cath(MOOG pump)  Infusion rate: 6ml/hr  Catheter Plan:Catheter to remain Insitu, Continue catheter infusion rate unchanged and Patient to be discharged home

## 2019-01-22 NOTE — DISCHARGE SUMMARY
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      Hospital Course:  Taya Lara is a 68 y.o. female who is deaf and has PMH significant for HTN, HLD, hypothyroidism, T2DM presented to [facility] on 2019 for Right shoulder arthroplasty reversal.  Patient tolerated the procedure well.  Sitting up in chair as much as possible.  Walked halls with PT.  patient is comfortable with the shoulder exercises and the OnQ pump.  patient had  in the room.  All information discuss ed and questions answered to the patient's satisfaction.  Patient will be discharged home with her family.  Discharge follow up appointments and recommendation are listed below.    Discharge Follow Up Recommendations for labs/diagnostics:  --Follow up with your family doctor within 1 week  --Follow up with Dr Perez on   Day of Discharge   HPI:   Patient is deaf and has  in room.  Patient is sitting up in a chair and states she is ready to go after she works with OT again today. Pain is under control.  She understands what she is to do with the OnQ pump and has  no questions.  +BM yesterday and feels she has to go this AM.  No adverse events overnight. No family in the room.    Review of Systems  Gen- No fevers, chills  CV- No chest pain, palpitations  Resp- No cough, dyspnea  GI- No N/V/D, abd pain  MS-right anterior under arm pain  Otherwise ROS is negative except as mentioned in the HPI.    Vital Signs:   Temp:  [97 °F (36.1 °C)-98.7 °F (37.1 °C)] 98.4 °F (36.9 °C)  Heart Rate:  [67-94] 77  Resp:  [16-18] 17  BP: (127-178)/() 156/137   Physical Exam:  Constitutional: Alert, WN, WD female in NAD sitting up in a chair  Eyes: PERRLA, sclerae anicteric, no conjunctival injection  Head: NCAT  ENT: [place], moist mucous membranes  Neck: Supple, no thyromegaly, no lymphadenopathy, trachea midline  Respiratory: Clear to auscultation bilaterally, nonlabored respirations   Cardiovascular: RRR, no murmurs, rubs, or gallops, palpable pedal pulses bilaterally  Gastrointestinal: Positive bowel sounds, soft, nontender, nondistended  Musculoskeletal: YOO except right shoulder; in shoulder immobilizer; no LE edema  Neurologic: Oriented x4, strength symmetric in all extremities, Cranial Nerves grossly intact to confrontation, speech clear  Skin: No rashes on exposed skin  Psychiatric: Pleasant and cooperative; normal affect  Pertinent  and/or Most Recent Results     Results from last 7 days   Lab Units 01/22/19  0513   WBC 10*3/mm3 10.69   HEMOGLOBIN g/dL 12.3   HEMATOCRIT % 37.2   PLATELETS 10*3/mm3 266   SODIUM mmol/L 139   POTASSIUM mmol/L 3.5   CHLORIDE mmol/L 105   CO2 mmol/L 28.0   BUN mg/dL 15   CREATININE mg/dL 0.74   GLUCOSE mg/dL 173*   CALCIUM mg/dL 9.3           Invalid input(s): PROT, LABALBU        Invalid input(s): TG, LDLCALC, LDLREALC  Results from last 7 days   Lab Units 01/22/19  0513   TSH mIU/mL 0.883     Brief Urine Lab Results     None        Microbiology Results Abnormal     None        Imaging Results (all)     Procedure Component Value Units Date/Time    XR  Shoulder 2+ View Right [[id number]] Collected:  01/21/19 1054     Updated:  01/21/19 1227    Narrative:       EXAMINATION: XR SHOULDER 2+ VW RIGHT- 01/21/2019     INDICATION: postop      COMPARISON: 01/14/2019     FINDINGS: Status post right total shoulder arthroplasty without evidence  for complication in excellent anatomic alignment. Expected postsurgical  changes in the adjacent soft tissues including soft tissue emphysema.             Impression:       Status post total shoulder arthroplasty without evidence of  complication in excellent anatomic alignment.     D:  01/21/2019  E:  01/21/2019     This report was finalized on 1/21/2019 12:25 PM by Dr. Too Armendariz.           Discharge Details        Discharge Medications      New Medications      Instructions Start Date   HYDROcodone-acetaminophen  MG per tablet  Commonly known as:  NORCO   1 tablet, Oral, Every 4 Hours PRN      Ropivacine HCl-NaCl  Commonly known as:  NAROPIN   6 mL/hr, Peripheral Nerve, Continuous         Continue These Medications      Instructions Start Date   aspirin 81 MG EC tablet   81 mg, Oral, Daily      betamethasone dipropionate 0.05 % cream  Commonly known as:  DIPROLENE   1 application, Topical, 2 Times Daily      busPIRone 10 MG tablet  Commonly known as:  BUSPAR   10 mg, Oral, 3 Times Daily      carvedilol 25 MG tablet  Commonly known as:  COREG   25 mg, Oral, 2 Times Daily With Meals      digoxin 125 MCG tablet  Commonly known as:  LANOXIN   125 mcg, Oral, Daily Digoxin      fenofibrate 145 MG tablet  Commonly known as:  TRICOR   145 mg, Oral, Daily      fish oil 1000 MG capsule capsule   3,000 mg, Oral, Daily With Breakfast      furosemide 40 MG tablet  Commonly known as:  LASIX   40 mg, Oral, Daily      hydrocortisone 2.5 % cream   1 application, Topical, 2 Times Daily      levothyroxine 175 MCG tablet  Commonly known as:  SYNTHROID, LEVOTHROID   175 mcg, Oral, Daily      lisinopril 40 MG tablet  Commonly known as:   PRINIVIL,ZESTRIL   40 mg, Oral, Daily      metFORMIN 500 MG tablet  Commonly known as:  GLUCOPHAGE   Oral, Take As Directed, 1 tablet in am and 2 tablets in evening      multivitamin with minerals tablet tablet   1 tablet, Oral, Daily      OTEZLA PO   1 capsule, Oral, 2 Times Daily, Unsure if 20 or 30 mg      pravastatin 80 MG tablet  Commonly known as:  PRAVACHOL   80 mg, Oral, Daily      venlafaxine  MG 24 hr capsule  Commonly known as:  EFFEXOR-XR   150 mg, Oral, Daily      Vitamin D3 1000 units capsule   3 capsules, Oral, Daily           No Known Allergies    Discharge Disposition:  Home or Self Care    Discharge Diet:  Diet Order   Procedures   • Diet Regular; Consistent Carbohydrate, Cardiac     Discharge Activity:   Activity Instructions     Activity as Tolerated      Additional Activity Instructions:      Advance activity as tolerated  Waterproof Aquacel dressing in place will remain in place until your follow up appointment  May shower when Arrow pump is removed                     CODE STATUS:    Code Status and Medical Interventions:   Ordered at: 01/21/19 1107     Code Status:    CPR     Medical Interventions (Level of Support Prior to Arrest):    Full     No future appointments.    Additional Instructions for the Follow-ups that You Need to Schedule     Discharge Follow-up with PCP   As directed       Currently Documented PCP:    Alley Finney MD    PCP Phone Number:    [phone]     Follow Up Details:  1 week; please schedule Prempro to patient's discharge         Discharge Follow-up with Specified Provider: 1 Week   As directed      Follow Up:  1 Week         Discharge Follow-up with Specified Provider: Dr Perez;  please schedule appointment on 1/29 prior to patient's discharge   As directed      To:  Dr Perez;  please schedule appointment on 1/29 prior to patient's discharge             Time Spent on Discharge:  35 minutes    Electronically signed by ASHLIE Lujan, 01/22/19,  9:42 AM.

## 2019-01-22 NOTE — PROGRESS NOTES
Orthopedic Daily Progress Note      CC: s/p right reverse TSA    Pain well controlled  General: no fevers, chills  Abdomen: no nausea, vomiting, or diarrhea    No other complaints    Physical Exam:  I have reviewed the vital signs.  Temp:  [97 °F (36.1 °C)-98.7 °F (37.1 °C)] 98.5 °F (36.9 °C)  Heart Rate:  [67-94] 81  Resp:  [16-18] 18  BP: (127-179)/(65-85) 142/65    Objective  General Appearance:    Alert, cooperative, no distress  Extremities: No clubbing, cyanosis, or edema to lower extremities  Pulses:  2+ in distal surgical extremity  Skin: Dressing Clean/dry/intact      Results Review:    I have reviewed the labs, radiology results and diagnostic studies:yes    Results from last 7 days   Lab Units 01/22/19  0513   WBC 10*3/mm3 10.69   HEMOGLOBIN g/dL 12.3   PLATELETS 10*3/mm3 266     Results from last 7 days   Lab Units 01/22/19  0513   SODIUM mmol/L 139   POTASSIUM mmol/L 3.5   CO2 mmol/L 28.0   CREATININE mg/dL 0.74   GLUCOSE mg/dL 173*       I have reviewed the medications.    Assessment/Problem List  POD# 1 S/p reverse right TSA, doing well    Plan  Prom only of shoulder per protocol; arom for elbow/wrist  PT/OT while in house  Do not remove dressing  Can shower once nerve catheter is in place, leave dressing in place  Follow up in Dr Perez's office next Tues 1/29        Discharge Planning: I expect patient to be discharged to home in 0 days.    Jo Ann Lemus MD  01/22/19  7:38 AM

## 2019-01-22 NOTE — DISCHARGE INSTR - ACTIVITY
Advance activity as tolerated  Waterproof Aquacel dressing in place will remain in place until your follow up appointment  May shower when Arrow pump is removed      Daily Note     Today's date: 2018  Patient name: Rochelle Lentz  : 1939  MRN: [id number]  Referring provider: Jenniffer Alston DO  Dx:   Encounter Diagnosis     ICD-10-CM    1  Chronic pain of right hip M25 551     G89 29                   Subjective: Pt reports that he feels good today, no new complaints  Felt good after last session too  Overall improvement since beginning  Objective: Pt requires cueing initially during BW squats w/ GTB around knees, corrects well and maintains correction    Daily Treatment Diary     Manual  3/29 4/4 4/6 4/11         LAD on R x2 min hold 2x2 min holds See note from that day x2 min holds         HF and piri release             MWM for R hip flex 2x10 2x10   2x10         MWM for R hip ER  x5 2x5  x10-15           Lat belt distraction 2x2 min holds  2x2 min holds           Exercise Diary  3/29 4/4  4/11         bike Pre 7 min lvl 4 Pre 7 min lvl 4  7 min lvl 4         HS and glute stretching 3x30 sec holds on R, 2x30 sec on L 3x30 sec holds B  3x30 sec holds on R         HF stretching             piri stretching 2x30 sec B 2x30 sec holds           Add sq 3 sec 2x10            bridging W/ add sq 2x10 W/ add sq 3x10  3x10 w/ add sq         clams             S/l hip abd  supinesingle leg fall outs 2x10 GTB  Hip abd walk GTB 50'x4         SLR W/ QS 2x10   D2 hip flexion PNF 2x10 B         pball squats 2x10 squats at bar 4x10  W/ GTB around knees 3x10         Step ups  6" x20 B  8" w/ opp leg lift x 10-15 B         Gait training              Standing marching  x20 B, focus on ecc x20 B, focus on ecc  exaggerated gait 100'x2         Self gastroc stretch 3x20 sec holds B              TrA single leg ext 2x10 B  4" glute med taps 2x10 B                                                                            Modalities  3/29                                                             Assessment: Tolerated treatment well   Patient demonstrated fatigue post treatment and would benefit from continued PT  Tolerates progressions well, takes cueing well to promote proper hip and core activation  Squat form is improving, especially w/ band above knees  Appropriate for higher level conditioning as able at next session  Plan: Continue per plan of care   heel raises, leg press or assisted squat, sit<>stand, pball or bosu

## 2019-01-22 NOTE — PLAN OF CARE
Problem: Patient Care Overview  Goal: Plan of Care Review  Outcome: Ongoing (interventions implemented as appropriate)   01/22/19 1045   Plan of Care Review   Progress no change   OTHER   Outcome Summary Pt seen for family teaching. Pt limited with significant desaturation to 75% on RA- nurse aware and ambulation deferred. Interscalene was running at 6 start of session, decreased to 4 during. Pre/post pain level 0 at rest. She tolerated right shoulder PROM FE 90, IR -10/ER 10 with fair teachback from SO. He will benefit from further training on sling and HEP.    Coping/Psychosocial   Plan of Care Reviewed With patient;significant other       Problem: Shoulder Arthroplasty (Adult)  Goal: Signs and Symptoms of Listed Potential Problems Will be Absent, Minimized or Managed (Shoulder Arthroplasty)  Outcome: Ongoing (interventions implemented as appropriate)   01/22/19 1045   Goal/Outcome Evaluation   Problems Assessed (Shoulder Arthro) functional deficit   Problems Present (Shoulder Arthro) functional deficit

## 2019-01-22 NOTE — PROGRESS NOTES
Continued Stay Note  [facility]     Patient Name: Taya Lara  MRN: [id number]  Today's Date: 1/22/2019    Admit Date: 1/21/2019    Discharge Plan     Row Name 01/22/19 1408       Plan    Plan  Home    Patient/Family in Agreement with Plan  yes    Plan Comments  Spoke with patient via  at bedside, SO was present. She lives with her SO in a one story in [facility]. [facility] she was independent with ADL's and mobility. She denies any HH/DME needs at this time. Plan is to return home. CM following.    Final Discharge Disposition Code  01 - home or self-care        Discharge Codes    No documentation.       Expected Discharge Date and Time     Expected Discharge Date Expected Discharge Time    Jan 22, 2019             Karma Brantley RN

## 2019-01-23 ENCOUNTER — APPOINTMENT (OUTPATIENT)
Dept: GENERAL RADIOLOGY | Facility: HOSPITAL | Age: 69
End: 2019-01-23

## 2019-01-23 LAB
GLUCOSE BLDC GLUCOMTR-MCNC: 167 MG/DL (ref 70–130)
GLUCOSE BLDC GLUCOMTR-MCNC: 167 MG/DL (ref 70–130)
GLUCOSE BLDC GLUCOMTR-MCNC: 185 MG/DL (ref 70–130)
GLUCOSE BLDC GLUCOMTR-MCNC: 186 MG/DL (ref 70–130)

## 2019-01-23 PROCEDURE — 71046 X-RAY EXAM CHEST 2 VIEWS: CPT

## 2019-01-23 PROCEDURE — 82962 GLUCOSE BLOOD TEST: CPT

## 2019-01-23 PROCEDURE — 99233 SBSQ HOSP IP/OBS HIGH 50: CPT | Performed by: NURSE PRACTITIONER

## 2019-01-23 PROCEDURE — 63710000001 INSULIN LISPRO (HUMAN) PER 5 UNITS: Performed by: NURSE PRACTITIONER

## 2019-01-23 PROCEDURE — 97535 SELF CARE MNGMENT TRAINING: CPT

## 2019-01-23 PROCEDURE — 97110 THERAPEUTIC EXERCISES: CPT

## 2019-01-23 PROCEDURE — 71045 X-RAY EXAM CHEST 1 VIEW: CPT

## 2019-01-23 RX ADMIN — ATORVASTATIN CALCIUM 20 MG: 20 TABLET, FILM COATED ORAL at 13:02

## 2019-01-23 RX ADMIN — MULTIPLE VITAMINS W/ MINERALS TAB 1 TABLET: TAB ORAL at 08:53

## 2019-01-23 RX ADMIN — CARVEDILOL 25 MG: 12.5 TABLET, FILM COATED ORAL at 17:42

## 2019-01-23 RX ADMIN — DOCUSATE SODIUM 100 MG: 100 CAPSULE, LIQUID FILLED ORAL at 06:09

## 2019-01-23 RX ADMIN — BUSPIRONE HYDROCHLORIDE 10 MG: 10 TABLET ORAL at 16:34

## 2019-01-23 RX ADMIN — HYDROCODONE BITARTRATE AND ACETAMINOPHEN 1 TABLET: 10; 325 TABLET ORAL at 10:01

## 2019-01-23 RX ADMIN — LISINOPRIL 40 MG: 40 TABLET ORAL at 08:53

## 2019-01-23 RX ADMIN — INSULIN LISPRO 2 UNITS: 100 INJECTION, SOLUTION INTRAVENOUS; SUBCUTANEOUS at 13:02

## 2019-01-23 RX ADMIN — HYDROCODONE BITARTRATE AND ACETAMINOPHEN 1 TABLET: 10; 325 TABLET ORAL at 14:14

## 2019-01-23 RX ADMIN — BUSPIRONE HYDROCHLORIDE 10 MG: 10 TABLET ORAL at 21:39

## 2019-01-23 RX ADMIN — FUROSEMIDE 40 MG: 40 TABLET ORAL at 08:52

## 2019-01-23 RX ADMIN — FENOFIBRATE 145 MG: 145 TABLET ORAL at 08:53

## 2019-01-23 RX ADMIN — INSULIN LISPRO 2 UNITS: 100 INJECTION, SOLUTION INTRAVENOUS; SUBCUTANEOUS at 17:43

## 2019-01-23 RX ADMIN — SENNOSIDES AND DOCUSATE SODIUM 2 TABLET: 8.6; 5 TABLET ORAL at 08:53

## 2019-01-23 RX ADMIN — INSULIN LISPRO 2 UNITS: 100 INJECTION, SOLUTION INTRAVENOUS; SUBCUTANEOUS at 21:39

## 2019-01-23 RX ADMIN — LEVOTHYROXINE SODIUM 175 MCG: 125 TABLET ORAL at 06:09

## 2019-01-23 RX ADMIN — VENLAFAXINE HYDROCHLORIDE 150 MG: 75 CAPSULE, EXTENDED RELEASE ORAL at 08:52

## 2019-01-23 RX ADMIN — INSULIN LISPRO 2 UNITS: 100 INJECTION, SOLUTION INTRAVENOUS; SUBCUTANEOUS at 08:55

## 2019-01-23 RX ADMIN — CARVEDILOL 25 MG: 12.5 TABLET, FILM COATED ORAL at 08:53

## 2019-01-23 RX ADMIN — HYDROCODONE BITARTRATE AND ACETAMINOPHEN 1 TABLET: 10; 325 TABLET ORAL at 21:52

## 2019-01-23 RX ADMIN — HYDROCODONE BITARTRATE AND ACETAMINOPHEN 1 TABLET: 10; 325 TABLET ORAL at 02:49

## 2019-01-23 RX ADMIN — SENNOSIDES AND DOCUSATE SODIUM 2 TABLET: 8.6; 5 TABLET ORAL at 21:39

## 2019-01-23 RX ADMIN — DIGOXIN 125 MCG: 125 TABLET ORAL at 13:02

## 2019-01-23 RX ADMIN — BUSPIRONE HYDROCHLORIDE 10 MG: 10 TABLET ORAL at 08:53

## 2019-01-23 NOTE — PROGRESS NOTES
[facility] Medicine Services  PROGRESS NOTE    Patient Name: Taya Lara  : 1950  MRN: [id number]    Date of Admission: 2019  Length of Stay: 2  Primary Care Physician: Alley Finney MD    Subjective   Subjective     CC:  Hypoxia    HPI:  Patient up in chair, sleeping.  Mouth breathing.  Oxygen saturations noted to be 86% on room air.  Aroused easily.   at bedside.  Patient denies any significant shortness of air right now but states that prior to surgery she felt like she couldn't catch her breath.  She states that she hasn't been up walking around much and hasn't been using her incentive spirometer.  Denies any productive cough or chest pain.        Review of Systems  Gen- No fevers, chills  CV- No chest pain, palpitations  Resp- No cough, dyspnea  GI- No N/V/D, abd pain    Otherwise ROS is negative except as mentioned in the HPI.    Objective   Objective     Vital Signs:   Temp:  [98 °F (36.7 °C)-98.8 °F (37.1 °C)] 98 °F (36.7 °C)  Heart Rate:  [] 91  Resp:  [17-18] 18  BP: (107-165)/(66-97) 107/94        Physical Exam:  Constitutional: No acute distress, asleep but arouses to touch, up in chair  HENT: NCAT, mucous membranes moist, deaf-  at bedside  Respiratory: Clear to auscultation bilaterally, decreased in bases, no crackles or rales noted. respiratory effort normal on 1-2LNC.  Incentive spirometer to 500.    Cardiovascular: RRR, no murmurs, rubs, or gallops, palpable pedal pulses bilaterally  Gastrointestinal: Positive bowel sounds, soft, nontender, nondistended  Musculoskeletal: No bilateral ankle edema, right shoulder in surgical sling  Psychiatric: Appropriate affect, cooperative  Neurologic: Oriented x 3, strength symmetric in all extremities, Cranial Nerves grossly intact to confrontation  Skin: No rashes to exposed slin      Results Reviewed:  I have personally reviewed current lab, radiology, and data and  agree.    Results from last 7 days   Lab Units 01/22/19  0513   WBC 10*3/mm3 10.69   HEMOGLOBIN g/dL 12.3   HEMATOCRIT % 37.2   PLATELETS 10*3/mm3 266     Results from last 7 days   Lab Units 01/22/19  0513   SODIUM mmol/L 139   POTASSIUM mmol/L 3.5   CHLORIDE mmol/L 105   CO2 mmol/L 28.0   BUN mg/dL 15   CREATININE mg/dL 0.74   GLUCOSE mg/dL 173*   CALCIUM mg/dL 9.3     Estimated Creatinine Clearance: 69.1 mL/min (by C-G formula based on SCr of 0.74 mg/dL).    No results found for: BNP    Microbiology Results Abnormal     None          Imaging Results (last 24 hours)     Procedure Component Value Units Date/Time    XR Chest PA & Lateral [[id number]] Collected:  01/23/19 1333     Updated:  01/23/19 1340    Narrative:       EXAMINATION: XR CHEST, PA AND LATERAL-01/23/2019:      INDICATION: AM portable with poor body alignment- hypoxia; Z74.09-Other  reduced mobility.     TECHNIQUE: Two view chest.     COMPARISONS: 4 hours prior.     FINDINGS: Stable support of operative hardware. No focal airspace  disease. Unchanged trace right effusion. No pneumothorax. Unchanged  cardiomediastinal silhouette.       Impression:       Small volume right pleural effusion.     D:  01/23/2019  E:  01/23/2019     This report was finalized on 1/23/2019 1:38 PM by Alan Brown.       XR Chest 1 View [[id number]] Collected:  01/23/19 1000     Updated:  01/23/19 1000    Narrative:       EXAMINATION: XR CHEST 1 VW-      INDICATION: Hypoxia; Z74.09-Other reduced mobility.      COMPARISON: 01/14/2019.     FINDINGS: Portable chest reveals heart to be enlarged. Small right  pleural effusion with mild increased markings at the right lung base all  have slightly worsened in the interval. Pulmonary vascularity is within  normal limits. Degenerative change is seen within the spine. The cardiac  pacer leads are in satisfactory position.           Impression:       Slight increase seen in size of the heart in the interval  with development of a small  right pleural effusion and mild increased  markings at the right lung base.     D:  01/23/2019  E:  01/23/2019                  I have reviewed the medications:    Current Facility-Administered Medications:   •  acetaminophen (TYLENOL) suppository 650 mg, 650 mg, Rectal, Q4H PRN, Usha Olivas APRN  •  acetaminophen (TYLENOL) tablet 650 mg, 650 mg, Oral, Q4H PRN, Usha Olivas APRN  •  atorvastatin (LIPITOR) tablet 20 mg, 20 mg, Oral, Daily, Usha Olivas APRN, 20 mg at 01/23/19 1302  •  bisacodyl (DULCOLAX) EC tablet 10 mg, 10 mg, Oral, Daily PRN, Carl Perez MD  •  bisacodyl (DULCOLAX) suppository 10 mg, 10 mg, Rectal, Daily PRN, Usha Olivas APRN  •  busPIRone (BUSPAR) tablet 10 mg, 10 mg, Oral, TID, Usha Olivas APRN, 10 mg at 01/23/19 0853  •  calcium carbonate (TUMS) chewable tablet 500 mg (200 mg elemental), 2 tablet, Oral, BID PRN, Usha Olivas APRN  •  carvedilol (COREG) tablet 25 mg, 25 mg, Oral, BID With Meals, Usha Olivas APRN, 25 mg at 01/23/19 0853  •  dextrose (D50W) 25 g/ 50mL Intravenous Solution 25 g, 25 g, Intravenous, Q15 Min PRN, Usha Olivas APRN  •  dextrose (GLUTOSE) oral gel 15 g, 15 g, Oral, Q15 Min PRN, Usha Olivas APRN  •  digoxin (LANOXIN) tablet 125 mcg, 125 mcg, Oral, Daily, Usha Olivas APRN, 125 mcg at 01/23/19 1302  •  docusate sodium (COLACE) capsule 100 mg, 100 mg, Oral, BID PRN, Carl Perez MD, 100 mg at 01/23/19 0609  •  fenofibrate (TRICOR) tablet 145 mg, 145 mg, Oral, Daily, Usha Olivas APRN, 145 mg at 01/23/19 0853  •  furosemide (LASIX) tablet 40 mg, 40 mg, Oral, Daily, Usha Olivas, ASHLIE, 40 mg at 01/23/19 0852  •  glucagon (human recombinant) (GLUCAGEN DIAGNOSTIC) injection 1 mg, 1 mg, Subcutaneous, PRN, Usha Olivas, APRN  •  HYDROcodone-acetaminophen (NORCO)  MG per tablet 1 tablet, 1 tablet, Oral, Q4H PRN, Carl Perez MD, 1 tablet at 01/23/19 1001  •  HYDROmorphone (DILAUDID) injection 0.5 mg, 0.5 mg,  Intravenous, Q2H PRN **AND** naloxone (NARCAN) injection 0.1 mg, 0.1 mg, Intravenous, Q5 Min PRN, Carl Perez MD  •  insulin lispro (humaLOG) injection 0-7 Units, 0-7 Units, Subcutaneous, 4x Daily With Meals & Nightly, Usha Olivas APRN, 2 Units at 01/23/19 1302  •  lactated ringers infusion, 9 mL/hr, Intravenous, Continuous, Misael Heaton MD  •  levothyroxine (SYNTHROID, LEVOTHROID) tablet 175 mcg, 175 mcg, Oral, Q AM, Usha Olivas APRN, 175 mcg at 01/23/19 0609  •  lisinopril (PRINIVIL,ZESTRIL) tablet 40 mg, 40 mg, Oral, Daily, Usha Olivas APRN, 40 mg at 01/23/19 0853  •  magnesium hydroxide (MILK OF MAGNESIA) suspension 2400 mg/10mL 10 mL, 10 mL, Oral, Daily PRN, Carl Perez MD  •  Magnesium Sulfate 2 gram Bolus, followed by 8 gram infusion (total Mg dose 10 grams)- Mg less than or equal to 1mg/dL, 2 g, Intravenous, PRN **OR** Magnesium Sulfate 2 gram / 50mL Infusion (GIVE X 3 BAGS TO EQUAL 6GM TOTAL DOSE) - Mg 1.1 - 1.5 mg/dl, 2 g, Intravenous, PRN **OR** Magnesium Sulfate 4 gram infusion- Mg 1.6-1.9 mg/dL, 4 g, Intravenous, PRN, Usha Olivas APRN  •  multivitamin with minerals 1 tablet, 1 tablet, Oral, Daily, Usha Olivas APRN, 1 tablet at 01/23/19 0853  •  ondansetron (ZOFRAN) tablet 4 mg, 4 mg, Oral, Q6H PRN **OR** ondansetron (ZOFRAN) injection 4 mg, 4 mg, Intravenous, Q6H PRN, Usha Olivas APRN  •  potassium chloride (MICRO-K) CR capsule 40 mEq, 40 mEq, Oral, PRN, 40 mEq at 01/22/19 0832 **OR** potassium chloride (KLOR-CON) packet 40 mEq, 40 mEq, Oral, PRN **OR** potassium chloride 10 mEq in 100 mL IVPB, 10 mEq, Intravenous, Q1H PRN, Usha Olivas APRN  •  ropivacaine (Naropin) 0.2% in NS infusion (ARROW Pump), 6 mL/hr, Peripheral Nerve, Continuous, Misael Heaton MD, Last Rate: 6 mL/hr at 01/21/19 0927, 6 mL/hr at 01/21/19 0927  •  sennosides-docusate sodium (SENOKOT-S) 8.6-50 MG tablet 2 tablet, 2 tablet, Oral, BID, Usha Olivas APRN, 2 tablet at 01/23/19 0853  •   sodium chloride 0.45 % infusion, 50 mL/hr, Intravenous, Continuous, Carl Perez MD, Last Rate: 50 mL/hr at 01/21/19 0952, 50 mL/hr at 01/21/19 0952  •  venlafaxine XR (EFFEXOR-XR) 24 hr capsule 150 mg, 150 mg, Oral, Daily, TommypatUsha, ASHLIE, 150 mg at 01/23/19 0852      Assessment/Plan   Assessment / Plan     Active Hospital Problems    Diagnosis Date Noted   • **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 female with a PMH significant for HTN, HLD, hypothyroid, T2DM and deafness who presented to [facility] on 1/21/19 s/p right reverse total shoulder arthroplasty with right biceps tenodesis by Dr. Perez.  Hospital medicine was consulted for assistance with medical management postoperatively.  The patient was scheduled to be discharged home on 1/22 but was kept again, as she was unable to be weaned off of her oxygen.       S/P right reverse total shoulder arthroplasty  - management per Dr. Perez  - follow up with him on Tuesday 1/29 in office.    -PT/OT at home.    - On-Q pump in place.        Acute Hypoxia  - chest x ray PA/Lat this am with little change from pre-operatively.   - exam fairly benign  - likely post-op atelectasis, as she has not been actively utilizing her incentive spirometer or ambulating much.    - afebrile, no productive cough or significant shortness of air.  Patient reports a history of heart failure but she appears compensated.   - currently requiring 02, which she does not use at home.  Attempt to wean to room air today.    - AMBULATE AT LEAST TWICE TODAY  - INCENTIVE SPIROMETER-I had her demonstrate the correct use to me.  She was only able to get it up to  approximately 500.  Encourage frequent use.    - Home tomorrow if able to be weaned to room air.          DVT Prophylaxis:  Per surgery    Disposition: I expect the patient to be discharged home tomorrow if able to wean to room air.      CODE STATUS:   Code Status and Medical Interventions:   Ordered at: 01/21/19 1107     Code Status:    CPR     Medical Interventions (Level of Support Prior to Arrest):    Full         Electronically signed by ASHLIE Cason, 01/23/19, 1:41 PM.

## 2019-01-23 NOTE — PROGRESS NOTES
Case Management Discharge Note    Final Note: home    Destination      No service has been selected for the patient.      Durable Medical Equipment      No service has been selected for the patient.      Dialysis/Infusion      No service has been selected for the patient.      Home Medical Care      No service has been selected for the patient.      Community Resources      No service has been selected for the patient.             Final Discharge Disposition Code: 01 - home or self-care

## 2019-01-23 NOTE — THERAPY TREATMENT NOTE
Acute Care - Occupational Therapy Treatment Note  [facility]     Patient Name: Taya Lraa  : 1950  MRN: [id number]  Today's Date: 2019  Onset of Illness/Injury or Date of Surgery: 19  Date of Referral to OT: 19  Referring Physician: Dr. Perez    Admit Date: 2019       ICD-10-CM ICD-9-CM   1. Impaired mobility and ADLs Z74.09 799.89     Patient Active Problem List   Diagnosis   • Status post reverse arthroplasty of right shoulder   • Type 2 diabetes mellitus (CMS/HCC)   • Hypothyroidism (acquired)   • HLD (hyperlipidemia)   • Essential hypertension   • Bilateral deafness     Past Medical History:   Diagnosis Date   • Arthritis    • Cataract     unsure which eye, mild   • Deaf    • Diabetes mellitus (CMS/HCC)     doesnt check sugar often    • Disease of thyroid gland    • Heartburn     occasionally   • History of kidney stones    • Hyperlipidemia    • Hypertension    • Kidney disorder     saw doctor and states dr said (left?) kidney swollen but pt had no s/s- following up with nephro to check out later    • Pacemaker     Anjuke - card on chart - home monitoring    • Psoriasis    • Wears dentures     upper    • Wears glasses      Past Surgical History:   Procedure Laterality Date   • CHOLECYSTECTOMY     • COLONOSCOPY     • ENDOSCOPY     • PACEMAKER IMPLANTATION     • TOTAL SHOULDER ARTHROPLASTY W/ DISTAL CLAVICLE EXCISION Right 2019    Procedure: TOTAL SHOULDER REVERSE ARTHROPLASTY WITH BICEP TENODESIS RIGHT;  Surgeon: Carl Perez MD;  Location: [facility];  Service: Orthopedics   • TUBAL ABDOMINAL LIGATION         Therapy Treatment    Rehabilitation Treatment Summary     Row Name 19 1350             Treatment Time/Intention    Discipline  occupational therapist  -HK      Document Type  therapy note (daily note)  -HK      Subjective Information  complains of;weakness;fatigue;pain  -HK      Mode of Treatment  individual therapy;occupational therapy   -HK      Patient/Family Observations  Pt received supine in bed with O2 intact.  at bedside.    -HK      Treatment Considerations/Comments   not available at this time. Session used for caregiver education. Pt hesitant to allow  to compelte ROM.    (Significant)   -HK      Recorded by [HK] Lou Malave, OT 01/23/19 1519      Row Name 01/23/19 1350             Vital Signs    Pre Systolic BP Rehab  -- RN cleared for tx  -HK      Pre SpO2 (%)  92  -HK      O2 Delivery Pre Treatment  supplemental O2  -HK      Intra SpO2 (%)  86  (Significant)   -HK      O2 Delivery Intra Treatment  room air  -HK      Post SpO2 (%)  92  -HK      O2 Delivery Post Treatment  supplemental O2  -HK      Pre Patient Position  Supine  -HK      Intra Patient Position  Standing  -HK      Post Patient Position  Supine  -HK      Recorded by [HK] Lou Malave, OT 01/23/19 1519      Row Name 01/23/19 1354             Cognitive Assessment/Intervention    Additional Documentation  Cognitive Assessment/Intervention (Group)  -HK      Recorded by [HK] Lou Malave, OT 01/23/19 1519      Row Name 01/23/19 1350             Cognitive Assessment/Intervention- PT/OT    Affect/Mental Status (Cognitive)  WNL  -HK      Orientation Status (Cognition)  oriented x 4  -HK      Follows Commands (Cognition)  follows one step commands;over 90% accuracy;WNL  -HK      Cognitive Function (Cognitive)  WNL  -HK      Safety Deficit (Cognitive)  mild deficit;safety precautions awareness;safety precautions follow-through/compliance  -HK      Personal Safety Interventions  fall prevention program maintained;gait belt;nonskid shoes/slippers when out of bed  -HK      Recorded by [HK] Lou Malave, OT 01/23/19 1519      Row Name 01/23/19 1357             Mobility Assessment/Intervention    Extremity Weight-bearing Status  right upper extremity  (Significant)   -HK      Right Upper Extremity (Weight-bearing Status)  non weight-bearing (NWB)   (Significant)   -HK      Recorded by [HK] Lou Malave, OT 01/23/19 1519      Row Name 01/23/19 1350             Bed Mobility Assessment/Treatment    Bed Mobility Assessment/Treatment  scooting/bridging;supine-sit  -HK      Scooting/Bridging [place] (Bed Mobility)  verbal cues;supervision  -HK      Supine-Sit [place] (Bed Mobility)  minimum assist (75% patient effort);verbal cues  -HK      Bed Mobility, Safety Issues  decreased use of arms for pushing/pulling  -HK      Assistive Device (Bed Mobility)  head of bed elevated  -HK      Comment (Bed Mobility)   provided Bernabe for patient to advance to EOB.  educated on importance of maintaining spinal precautions during bed mobility.   -HK      Recorded by [HK] Lou Malave, OT 01/23/19 1519      Row Name 01/23/19 1352             Functional Mobility    Functional Mobility- Ind. Level  contact guard assist;nonverbal cues required (demo/gesture)  -HK      Functional Mobility- Device  other (see comments) none  -HK      Functional Mobility-Distance (Feet)  15  -HK      Functional Mobility- Comment  Pt ambulated from bed to bathroom and back to bed. Pt with O2 saturation of 86 percent after returning to bed. Saturation returned to 92 after O2 donned. RN aware.   -HK      Recorded by [HK] Lou Malave, OT 01/23/19 1519      Row Name 01/23/19 1350             Transfer Assessment/Treatment    Transfer Assessment/Treatment  sit-stand transfer;stand-sit transfer;toilet transfer  -HK      Comment (Transfers)  Demo/gesture for chair approach   -HK      Recorded by [HK] Lou Malave, OT 01/23/19 1519      Row Name 01/23/19 1350             Sit-Stand Transfer    Sit-Stand [place] (Transfers)  contact guard;nonverbal cues (demo/gesture)  -HK      Assistive Device (Sit-Stand Transfers)  other (see comments) none  -HK      Recorded by [HK] Lou Malave, OT 01/23/19 1519      Row Name 01/23/19 1350             Stand-Sit Transfer    Stand-Sit [place]  (Transfers)  contact guard;nonverbal cues (demo/gesture)  -HK      Assistive Device (Stand-Sit Transfers)  other (see comments) none  -HK      Recorded by [HK] Lou Malave, OT 01/23/19 1519      Row Name 01/23/19 1351             Toilet Transfer    Type (Toilet Transfer)  sit-stand;stand-sit  -HK      Centre Level (Toilet Transfer)  contact guard  -HK      Assistive Device (Toilet Transfer)  -- none  -HK      Recorded by [HK] Lou Malave, OT 01/23/19 1519      Row Name 01/23/19 1352             ADL Assessment/Intervention    BADL Assessment/Intervention  upper body dressing;bathing;toileting  -HK      Recorded by [] Lou Malave, OT 01/23/19 1519      Row Name 01/23/19 1353             Bathing Assessment/Intervention    Comment (Bathing)  OT reviewed with spouse completion of axilla care while maintaining shoulder precautions. Pt without nerve catheter this date.   -HK      Recorded by [] Lou Malave, OT 01/23/19 1519      Row Name 01/23/19 1357             Upper Body Dressing Assessment/Training    Upper Body Dressing Centre Level  doff;don;other (see comments);supervision sling   -HK      Upper Body Dressing Position  supine  -HK      Comment (Upper Body Dressing)   doffed sling with supervision while pt supine in bed.  educated on sling management as well as wear and care, shoulder precautions, axilla care, and alba dressing technique.   -HK      Recorded by [HK] Lou Malave, OT 01/23/19 1519      Row Name 01/23/19 1355             Toileting Assessment/Training    Centre Level (Toileting)  toileting skills;supervision  -HK      Toileting Position  unsupported sitting  -HK      Recorded by [HK] Lou Malave, OT 01/23/19 1519      Row Name 01/23/19 1353             BADL Safety/Performance    Skilled BADL Treatment/Intervention  BADL process/adaptation training;compensatory training  -HK      Recorded by [HK] Lou Malave, OT 01/23/19 1519      Row Name 01/23/19 1358              Motor Skills Assessment/Interventions    Additional Documentation  Balance (Group);Therapeutic Exercise (Group);Therapeutic Exercise Interventions (Group)  -HK      Recorded by [HK] Lou Malave, OT 01/23/19 1519      Row Name 01/23/19 1350             Therapeutic Exercise    Upper Extremity Range of Motion (Therapeutic Exercise)  shoulder flexion/extension, right;shoulder internal/external rotation, right;elbow flexion/extension, right;forearm supination/pronation, right;wrist flexion/extension, right bilateral scapular retractions   -HK      Hand (Therapeutic Exercise)  finger flexion/extension, right  -HK      Exercise Type (Therapeutic Exercise)  AROM (active range of motion);AAROM (active assistive range of motion);PROM (passive range of motion) AROM hand/wrist/scap AAROM elbow PROM FE/IR/ER  -HK      Position (Therapeutic Exercise)  supine  -HK      Sets/Reps (Therapeutic Exercise)  1/10  -HK      Comment (Therapeutic Exercise)  Pt resistive with shoulder FE this date. Pt pushing against therapist and arching back due to pain. RN notifed and pain meds given. OT completed x10 reps with a maximum of 80 degrees.  educated on importance of reaching maximum shoulder FE.    -HK      Recorded by [HK] Lou Malave, OT 01/23/19 1519      Row Name 01/23/19 1350             Balance    Balance  static sitting balance;static standing balance;dynamic sitting balance  -HK      Recorded by [HK] Lou Malave, OT 01/23/19 1519      Row Name 01/23/19 1350             Static Sitting Balance    Level of [place] (Unsupported Sitting, Static Balance)  independent  -HK      Sitting Position (Unsupported Sitting, Static Balance)  sitting on edge of bed  -HK      Time Able to Maintain Position (Unsupported Sitting, Static Balance)  1 to 2 minutes  -HK      Recorded by [HK] Lou Malave, OT 01/23/19 1519      Row Name 01/23/19 1350             Dynamic Sitting Balance    Level of [place], Reaches Outside  Midline (Sitting, Dynamic Balance)  supervision  -HK      Sitting Position, Reaches Outside Midline (Sitting, Dynamic Balance)  other (see comments) on toilet   -HK      Comment, Reaches Outside Midline (Sitting, Dynamic Balance)  completing stacy care  -HK      Recorded by [HK] Lou Malave, OT 01/23/19 1519      Row Name 01/23/19 1350             Static Standing Balance    Level of [place] (Supported Standing, Static Balance)  contact guard assist  -HK      Time Able to Maintain Position (Supported Standing, Static Balance)  1 to 2 minutes  -HK      Assistive Device Utilized (Supported Standing, Static Balance)  other (see comments) none  -HK      Recorded by [HK] Lou Malave, OT 01/23/19 1519      Row Name 01/23/19 1350             Positioning and Restraints    Pre-Treatment Position  in bed  -HK      Post Treatment Position  bed  -HK      In Bed  notified nsg;supine;call light within reach;encouraged to call for assist;with family/caregiver  -HK      Recorded by [HK] Lou Malave, OT 01/23/19 1519      Row Name 01/23/19 1357             Pain Assessment    Additional Documentation  Pain Scale: FACES Pre/Post-Treatment (Group)  -HK      Recorded by [HK] Lou Malave, OT 01/23/19 1519      Row Name 01/23/19 1350             Pain Scale: Numbers Pre/Post-Treatment    Pain Location - Side  Right  -HK      Pain Location - Orientation  upper  -HK      Pain Location  extremity  -HK      Pain Intervention(s)  Repositioned;Ambulation/increased activity  -HK      Recorded by [HK] Lou Malave, OT 01/23/19 1511      Row Name 01/23/19 1350             Pain Scale: FACES Pre/Post-Treatment    Pain: FACES Scale, Pretreatment  2-->hurts little bit  -HK      Pain: FACES Scale, Post-Treatment  10-->hurts worst  -HK      Recorded by [HK] Lou Malave, OT 01/23/19 1519      Row Name 01/23/19 1350             Sensory Assessment/Intervention    Sensory General Assessment  no sensation deficits identified  -HK      Recorded  by [HK] Lou Malave, OT 01/23/19 1519      Row Name 01/23/19 1350             Orthotic/Prosthetic Management    Orthosis Location  upper extremity orthosis  -HK      Recorded by [HK] Lou Malave, OT 01/23/19 1519      Row Name 01/23/19 1350             Upper Extremity Orthosis Management    Type (Upper Extremity Orthosis)  Donjoy Ultra II sling   -HK      Functional Design (Upper Extremity Orthosis)  static orthosis  -HK      Therapeutic Indications (Upper Extremity Orthosis)  post-op positioning/protection;stabilization and support  -HK      Wearing Schedule (Upper Extremity Orthosis)  remove for exercise;remove for hygiene/bathing  -HK      Orthosis Training (Upper Extremity Orthosis)  caregiver;donning/doffing orthosis;orthosis adjustment;orthosis maintenance;cleaning/care of orthosis;purpose/goals of orthosis;skin inspection/precautions;sensory precautions;sleeping precautions;wearing schedule  -HK      Compliance/Wearing Issues (Upper Extremity Orthosis)  patient/caregiver comprehend strategies  -HK      Recorded by [HK] Lou Malave, OT 01/23/19 1519      Row Name                Wound 01/21/19 0825 Right shoulder incision    Wound - Properties Group Date first assessed: 01/21/19 [KS] Time first assessed: 0825 [KS] Side: Right [KS] Location: shoulder [KS] Type: incision [KS] Recorded by:  [KS] Martha Mcrae RN 01/21/19 0825    Row Name 01/23/19 1350             Coping    Observed Emotional State  accepting;calm  -HK      Recorded by [HK] Lou Malave, OT 01/23/19 1519      Row Name 01/23/19 1350             Plan of Care Review    Plan of Care Reviewed With  caregiver  -HK      Recorded by [HK] Lou Malave, OT 01/23/19 1519      Row Name 01/23/19 1353             Outcome Summary/Treatment Plan (OT)    Daily Summary of Progress (OT)  progress toward functional goals is good  -HK      Recorded by [HK] Lou Malave, OT 01/23/19 1519        User Key  (r) = Recorded By, (t) = Taken By, (c) = Cosigned By     Initials Name Effective Dates Discipline    KS Thor Martha L, RN 06/16/16 -  Nurse    Lou Banda, OT 03/07/18 -  OT        Wound 01/21/19 0825 Right shoulder incision (Active)   Dressing Appearance dry;intact;no drainage 1/23/2019  9:04 AM   Drainage Amount none 1/23/2019  9:04 AM       Occupational Therapy Education     Title: PT OT SLP Therapies (In Progress)     Topic: Occupational Therapy (In Progress)     Point: ADL training (In Progress)     Description: Instruct learner(s) on proper safety adaptation and remediation techniques during self care or transfers.   Instruct in proper use of assistive devices.    Learning Progress Summary           Patient Acceptance, E,TB,D, NR by AR at 1/22/2019 10:45 AM    Eager, E,TB,D, NR,VU by AR at 1/21/2019  1:39 PM   Significant Other Acceptance, E,TB,D, NR by AR at 1/22/2019 10:45 AM   Caregiver Acceptance, D,TB,E, VU,NR by CRISTY at 1/23/2019  3:20 PM    Comment:   educated on sling management as well as wear and care, shoulder precautions, axilla care, and alba dressing. Educated on safety precautions and appropriate body mechanics.                   Point: Home exercise program (In Progress)     Description: Instruct learner(s) on appropriate technique for monitoring, assisting and/or progressing therapeutic exercises/activities.    Learning Progress Summary           Patient Acceptance, E,TB,D, NR by AR at 1/22/2019 10:45 AM    Eager, E,TB,D, NR,VU by AR at 1/21/2019  1:39 PM   Significant Other Acceptance, E,TB,D, NR by AR at 1/22/2019 10:45 AM   Caregiver Acceptance, D,TB,E, VU,NR by  at 1/23/2019  3:20 PM    Comment:   educated on sling management as well as wear and care, shoulder precautions, axilla care, and alba dressing. Educated on safety precautions and appropriate body mechanics.                   Point: Precautions (In Progress)     Description: Instruct learner(s) on prescribed precautions during self-care and functional transfers.     Learning Progress Summary           Patient Acceptance, E,TB,D, NR by AR at 1/22/2019 10:45 AM    Eager, E,TB,D, NR,VU by AR at 1/21/2019  1:39 PM   Significant Other Acceptance, E,TB,D, NR by AR at 1/22/2019 10:45 AM   Caregiver Acceptance, D,TB,E, VU,NR by  at 1/23/2019  3:20 PM    Comment:   educated on sling management as well as wear and care, shoulder precautions, axilla care, and alba dressing. Educated on safety precautions and appropriate body mechanics.                   Point: Body mechanics (In Progress)     Description: Instruct learner(s) on proper positioning and spine alignment during self-care, functional mobility activities and/or exercises.    Learning Progress Summary           Patient Acceptance, E,TB,D, NR by AR at 1/22/2019 10:45 AM    Eager, E,TB,D, NR,VU by AR at 1/21/2019  1:39 PM   Significant Other Acceptance, E,TB,D, NR by AR at 1/22/2019 10:45 AM   Caregiver Acceptance, D,TB,E, VU,NR by  at 1/23/2019  3:20 PM    Comment:   educated on sling management as well as wear and care, shoulder precautions, axilla care, and alba dressing. Educated on safety precautions and appropriate body mechanics.                               User Key     Initials Effective Dates Name Provider Type Discipline    AR 06/22/15 -  Ale Sawyer, OT Occupational Therapist OT     03/07/18 -  Lou Malave OT Occupational Therapist OT                OT Recommendation and Plan  Outcome Summary/Treatment Plan (OT)  Daily Summary of Progress (OT): progress toward functional goals is good  Daily Summary of Progress (OT): progress toward functional goals is good  Plan of Care Review  Plan of Care Reviewed With: patient  Plan of Care Reviewed With: patient  Outcome Summary: No  available this session; Caregiver education compelted. Pt limited by pain and decreased O2 saturation this session.  able to dof/don sling with supervision and no assist from OT. Pt hesitant to allow   to assist with exercises. Pt completed AROM hand/wrist/scap x10 reps, AAROM of elbow with husbands assist x10 reps, and PROM FE/IR/ER x10 reps with OT. Pt resistant with FE and only reaching 80 degrees before pushing against therapist.  educated on importance of maximal FE. Pt completed bed mobility with Bernabe and sit to stand with CGA. Continue IP OT per POC.   Outcome Measures     Row Name 01/23/19 1350 01/22/19 1045 01/21/19 1339       How much help from another is currently needed...    Putting on and taking off regular lower body clothing?  2  -HK  2  -AR  2  -AR    Bathing (including washing, rinsing, and drying)  3  -HK  3  -AR  2  -AR    Toileting (which includes using toilet bed pan or urinal)  3  -HK  3  -AR  3  -AR    Putting on and taking off regular upper body clothing  2  -HK  2  -AR  2  -AR    Taking care of personal grooming (such as brushing teeth)  3  -HK  3  -AR  3  -AR    Eating meals  3  -HK  3  -AR  3  -AR    Score  16  -HK  16  -AR  15  -AR       Functional Assessment    Outcome Measure Options  AM-PAC 6 Clicks Daily Activity (OT)  -HK  AM-PAC 6 Clicks Daily Activity (OT)  -AR  AM-PAC 6 Clicks Daily Activity (OT)  -AR      User Key  (r) = Recorded By, (t) = Taken By, (c) = Cosigned By    Initials Name Provider Type    Ale Hyatt, OT Occupational Therapist     Lou Malave OT Occupational Therapist           Time Calculation:   Time Calculation- OT     Row Name 01/23/19 1350             Time Calculation- OT    OT Start Time  1350  -HK         Timed Charges    08300 - OT Therapeutic Exercise Minutes  20  -HK      65625 - OT Self Care/Mgmt Minutes  18  -HK        User Key  (r) = Recorded By, (t) = Taken By, (c) = Cosigned By    Initials Name Provider Type    Lou Banda, OT Occupational Therapist           Therapy Suggested Charges     Code   Minutes Charges    41045 (CPT®) Hc Ot Neuromusc Re Education Ea 15 Min      43268 (CPT®) Hc Ot Ther Proc Ea 15 Min 20 2     87284 (CPT®) Hc Ot Therapeutic Act Ea 15 Min      84045 (CPT®) Hc Ot Manual Therapy Ea 15 Min      13322 (CPT®) Hc Ot Iontophoresis Ea 15 Min      30309 (CPT®) Hc Ot Elec Stim Ea-Per 15 Min      84920 (CPT®) Hc Ot Ultrasound Ea 15 Min      17316 (CPT®) Hc Ot Self Care/Mgmt/Train Ea 15 Min 18 1    Total  38 3        Therapy Charges for Today     Code Description Service Date Service Provider Modifiers Qty    91036441926 HC OT SELF CARE/MGMT/TRAIN EA 15 MIN 1/23/2019 Lou Malave, OT GO 1    93596808435 HC OT THER PROC EA 15 MIN 1/23/2019 Lou Malave OT GO 2               Lou Malave OT  1/23/2019

## 2019-01-23 NOTE — PROGRESS NOTES
Case Management Discharge Note    Final Note: home; no discharge needs     Destination      No service has been selected for the patient.      Durable Medical Equipment      No service has been selected for the patient.      Dialysis/Infusion      No service has been selected for the patient.      Home Medical Care      No service has been selected for the patient.      Community Resources      No service has been selected for the patient.             Final Discharge Disposition Code: 01 - home or self-care

## 2019-01-23 NOTE — PLAN OF CARE
Problem: Patient Care Overview  Goal: Plan of Care Review  Outcome: Ongoing (interventions implemented as appropriate)   01/22/19 1715 01/23/19 1117   Plan of Care Review   Progress --  improving   OTHER   Outcome Summary --  Patient rested well this shift. Pain treated with PO PRN medication. Very pleasant. Ambulated with assist x1, gait belt.  at bedside during asseessment, very helpful. No significant events to report.     Patient still unable to maintain O2 above 87% on room air.    Coping/Psychosocial   Plan of Care Reviewed With patient --

## 2019-01-23 NOTE — PROGRESS NOTES
Orthopedic Daily Progress Note      CC: s/p right shoulder reverse TSA    Pain well controlled  General: no fevers, chills  Abdomen: no nausea, vomiting, or diarrhea    No other complaints    Physical Exam:  I have reviewed the vital signs.  Temp:  [98 °F (36.7 °C)-98.8 °F (37.1 °C)] 98 °F (36.7 °C)  Heart Rate:  [] 91  Resp:  [17-18] 18  BP: (107-165)/(66-97) 107/94    Objective  General Appearance:    Alert, cooperative, no distress  Extremities: No clubbing, cyanosis, or edema to lower extremities  Pulses:  2+ in distal surgical extremity  Skin: Dressing Clean/dry/intact      Results Review:    I have reviewed the labs, radiology results and diagnostic studies:yes    Results from last 7 days   Lab Units 01/22/19  0513   WBC 10*3/mm3 10.69   HEMOGLOBIN g/dL 12.3   PLATELETS 10*3/mm3 266     Results from last 7 days   Lab Units 01/22/19  0513   SODIUM mmol/L 139   POTASSIUM mmol/L 3.5   CO2 mmol/L 28.0   CREATININE mg/dL 0.74   GLUCOSE mg/dL 173*       I have reviewed the medications.    Assessment/Problem List  POD# 2 S/p reverse R tsa, with some hypoxia postoperatively requiring oxygen supplementation    Plan  Hypoxia--per medicine team, appreciate recommendations  Incentive spirometry  PT/OT while in house  PROM of shoulder only per postop protocol  D/C home when appropriate from medical standpoint  Encourage OOB mobilization   Follow up in Dr Perez's office 1/29 for postop check  Ok to shower with dressing in place; do not remove until follow up      Discharge Planning: I expect patient to be discharged to home in 0-1 days.    Jo Ann Lemus MD  01/23/19  3:32 PM

## 2019-01-23 NOTE — PLAN OF CARE
Problem: Patient Care Overview  Goal: Plan of Care Review  Outcome: Ongoing (interventions implemented as appropriate)   01/23/19 1520   Plan of Care Review   Progress improving   OTHER   Outcome Summary No  available this session; Caregiver education completed. Pt limited by pain and decreased O2 saturation this session.  able to dof/don sling with supervision and no assist from OT. Pt hesitant to allow  to assist with exercises. Pt completed AROM hand/wrist/scap x10 reps, AAROM of elbow with husbands assist x10 reps, and PROM FE/IR/ER x10 reps with OT. Pt resistant with FE and only reaching 80 degrees before pushing against therapist.  educated on importance of maximal FE. Pt completed bed mobility with Bernabe and sit to stand with CGA. Continue IP OT per POC.    Coping/Psychosocial   Plan of Care Reviewed With patient

## 2019-01-24 VITALS
HEART RATE: 80 BPM | RESPIRATION RATE: 18 BRPM | BODY MASS INDEX: 32.73 KG/M2 | TEMPERATURE: 99 F | OXYGEN SATURATION: 97 % | HEIGHT: 63 IN | DIASTOLIC BLOOD PRESSURE: 64 MMHG | WEIGHT: 184.75 LBS | SYSTOLIC BLOOD PRESSURE: 113 MMHG

## 2019-01-24 LAB
GLUCOSE BLDC GLUCOMTR-MCNC: 168 MG/DL (ref 70–130)
GLUCOSE BLDC GLUCOMTR-MCNC: 207 MG/DL (ref 70–130)

## 2019-01-24 PROCEDURE — 99239 HOSP IP/OBS DSCHRG MGMT >30: CPT | Performed by: NURSE PRACTITIONER

## 2019-01-24 PROCEDURE — 82962 GLUCOSE BLOOD TEST: CPT

## 2019-01-24 PROCEDURE — 97530 THERAPEUTIC ACTIVITIES: CPT | Performed by: OCCUPATIONAL THERAPIST

## 2019-01-24 PROCEDURE — 97535 SELF CARE MNGMENT TRAINING: CPT | Performed by: OCCUPATIONAL THERAPIST

## 2019-01-24 PROCEDURE — 63710000001 INSULIN LISPRO (HUMAN) PER 5 UNITS: Performed by: NURSE PRACTITIONER

## 2019-01-24 PROCEDURE — 97110 THERAPEUTIC EXERCISES: CPT | Performed by: OCCUPATIONAL THERAPIST

## 2019-01-24 RX ADMIN — HYDROCODONE BITARTRATE AND ACETAMINOPHEN 1 TABLET: 10; 325 TABLET ORAL at 05:42

## 2019-01-24 RX ADMIN — DIGOXIN 125 MCG: 125 TABLET ORAL at 12:15

## 2019-01-24 RX ADMIN — CARVEDILOL 25 MG: 12.5 TABLET, FILM COATED ORAL at 09:30

## 2019-01-24 RX ADMIN — LISINOPRIL 40 MG: 40 TABLET ORAL at 09:30

## 2019-01-24 RX ADMIN — SENNOSIDES AND DOCUSATE SODIUM 2 TABLET: 8.6; 5 TABLET ORAL at 12:14

## 2019-01-24 RX ADMIN — FENOFIBRATE 145 MG: 145 TABLET ORAL at 09:30

## 2019-01-24 RX ADMIN — FUROSEMIDE 40 MG: 40 TABLET ORAL at 09:30

## 2019-01-24 RX ADMIN — BUSPIRONE HYDROCHLORIDE 10 MG: 10 TABLET ORAL at 09:30

## 2019-01-24 RX ADMIN — INSULIN LISPRO 3 UNITS: 100 INJECTION, SOLUTION INTRAVENOUS; SUBCUTANEOUS at 12:15

## 2019-01-24 RX ADMIN — MULTIPLE VITAMINS W/ MINERALS TAB 1 TABLET: TAB ORAL at 12:13

## 2019-01-24 RX ADMIN — ATORVASTATIN CALCIUM 20 MG: 20 TABLET, FILM COATED ORAL at 09:30

## 2019-01-24 RX ADMIN — HYDROCODONE BITARTRATE AND ACETAMINOPHEN 1 TABLET: 10; 325 TABLET ORAL at 09:30

## 2019-01-24 RX ADMIN — LEVOTHYROXINE SODIUM 175 MCG: 125 TABLET ORAL at 05:36

## 2019-01-24 RX ADMIN — VENLAFAXINE HYDROCHLORIDE 150 MG: 75 CAPSULE, EXTENDED RELEASE ORAL at 12:14

## 2019-01-24 NOTE — DISCHARGE SUMMARY
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          Hospital Course:  Taya Lara is a 68 y.o. female who is deaf and has PMH significant for HTN, HLD, hypothyroidism, T2DM presented to [facility] on 2019 for Right shoulder arthroplasty reversal.  Patient tolerated the procedure well.  Sitting up in chair as much as possible.  Walked halls with PT.  patient is comfortable with the shoulder exercises and the OnQ pump.  patient had  in the room.  All information discuss ed and questions answered to the patient's satisfaction.  Patient will be discharged home with her family.  Discharge follow up appointments and recommendation are listed below.     Discharge Follow Up Recommendations for labs/diagnostics:  --Follow up with your family doctor within 1 week  --Follow up with Dr Perez on       ADDENDUM:  THE PATIENT WAS ORIGINALLY DISCHARGED ON 19 BUT HAD SOME ISSUES WITH ACUTE HYPOXIA.  SHE REMAINED HOSPITALIZED FOR AN ADDITIONAL 2 DAYS TO WORK UP HER HYPOXIA.  HER CHEST X RAY SHOWED NO ACUTE CHANGES.  SHE IS A FORMER SMOKER  BUT QUIT 14 YEARS AGO.  SHE REPORTS THAT HER OXYGEN LEVEL IS USUALLY AROUND 93% WHEN SHE SEES HER PCP.  SHE HAS BEEN WEANED TO ROOM AIR WITH OXYGEN LEVELS FROM 89-93% ON ROOM AIR NOTED.  SHE WILL BED DISCHARGED HOME TODAY AND HAS BEEN INSTRUCTED TO FOLLOW UP WITH HER PCP FOR FURTHER WORKUP OF HER LOW OXYGEN LEVELS.      Day of Discharge     HPI:   Resting comfortably in bed this morning with no family at bedside.      Review of Systems  Gen- No fevers, chills  CV- No chest pain, palpitations  Resp- No cough, dyspnea  GI- No N/V/D, abd pain      Otherwise ROS is negative except as mentioned in the HPI.    Vital Signs:   Temp:  [97.9 °F (36.6 °C)-99.3 °F (37.4 °C)] 99 °F (37.2 °C)  Heart Rate:  [82-91] 85  Resp:  [16-18] 18  BP: (107-153)/(69-94) 153/79     Physical Exam:  Constitutional: No acute distress, asleep but arouses to touch, resting in bed  HENT: NCAT, mucous membranes moist, deaf-  at bedside  Respiratory: Clear to auscultation bilaterally, decreased in bases, no crackles or rales noted. respiratory effort normal on 1-2LNC.  Incentive spirometer to 500.    Cardiovascular: RRR, no murmurs, rubs, or gallops, palpable pedal pulses bilaterally  Gastrointestinal: Positive bowel sounds, soft, nontender, nondistended  Musculoskeletal: No bilateral ankle edema, right shoulder in surgical sling  Psychiatric: Appropriate affect, cooperative  Neurologic: Oriented x 3, strength symmetric in all extremities, Cranial Nerves grossly intact to confrontation  Skin: No rashes to exposed skin        Pertinent  and/or Most Recent Results     Results from last 7 days   Lab Units 01/22/19  0513   WBC 10*3/mm3 10.69   HEMOGLOBIN g/dL 12.3   HEMATOCRIT % 37.2   PLATELETS 10*3/mm3 266   SODIUM mmol/L 139   POTASSIUM mmol/L 3.5   CHLORIDE mmol/L 105   CO2 mmol/L 28.0   BUN mg/dL 15   CREATININE mg/dL 0.74   GLUCOSE mg/dL 173*   CALCIUM mg/dL 9.3           Invalid input(s): PROT, LABALBU        Invalid input(s): TG,  LDLCALC, LDLREALC  Results from last 7 days   Lab Units 01/22/19  0513   TSH mIU/mL 0.883       Brief Urine Lab Results     None          Microbiology Results Abnormal     None          Imaging Results (all)     Procedure Component Value Units Date/Time    XR Chest PA & Lateral [[id number]] Collected:  01/23/19 1333     Updated:  01/23/19 1340    Narrative:       EXAMINATION: XR CHEST, PA AND LATERAL-01/23/2019:      INDICATION: AM portable with poor body alignment- hypoxia; Z74.09-Other  reduced mobility.     TECHNIQUE: Two view chest.     COMPARISONS: 4 hours prior.     FINDINGS: Stable support of operative hardware. No focal airspace  disease. Unchanged trace right effusion. No pneumothorax. Unchanged  cardiomediastinal silhouette.       Impression:       Small volume right pleural effusion.     D:  01/23/2019  E:  01/23/2019     This report was finalized on 1/23/2019 1:38 PM by Alan Brown.       XR Chest 1 View [[id number]] Collected:  01/23/19 1000     Updated:  01/23/19 1000    Narrative:       EXAMINATION: XR CHEST 1 VW-      INDICATION: Hypoxia; Z74.09-Other reduced mobility.      COMPARISON: 01/14/2019.     FINDINGS: Portable chest reveals heart to be enlarged. Small right  pleural effusion with mild increased markings at the right lung base all  have slightly worsened in the interval. Pulmonary vascularity is within  normal limits. Degenerative change is seen within the spine. The cardiac  pacer leads are in satisfactory position.           Impression:       Slight increase seen in size of the heart in the interval  with development of a small right pleural effusion and mild increased  markings at the right lung base.     D:  01/23/2019  E:  01/23/2019       XR Shoulder 2+ View Right [[id number]] Collected:  01/21/19 1054     Updated:  01/21/19 1227    Narrative:       EXAMINATION: XR SHOULDER 2+ VW RIGHT- 01/21/2019     INDICATION: postop      COMPARISON: 01/14/2019     FINDINGS: Status post right total  shoulder arthroplasty without evidence  for complication in excellent anatomic alignment. Expected postsurgical  changes in the adjacent soft tissues including soft tissue emphysema.             Impression:       Status post total shoulder arthroplasty without evidence of  complication in excellent anatomic alignment.     D:  01/21/2019  E:  01/21/2019     This report was finalized on 1/21/2019 12:25 PM by Dr. Too Armendariz.                              Discharge Details        Discharge Medications      New Medications      Instructions Start Date   HYDROcodone-acetaminophen  MG per tablet  Commonly known as:  NORCO   1 tablet, Oral, Every 4 Hours PRN      Ropivacine HCl-NaCl  Commonly known as:  NAROPIN   6 mL/hr, Peripheral Nerve, Continuous         Continue These Medications      Instructions Start Date   aspirin 81 MG EC tablet   81 mg, Oral, Daily      betamethasone dipropionate 0.05 % cream  Commonly known as:  DIPROLENE   1 application, Topical, 2 Times Daily      busPIRone 10 MG tablet  Commonly known as:  BUSPAR   10 mg, Oral, 3 Times Daily      carvedilol 25 MG tablet  Commonly known as:  COREG   25 mg, Oral, 2 Times Daily With Meals      digoxin 125 MCG tablet  Commonly known as:  LANOXIN   125 mcg, Oral, Daily Digoxin      fenofibrate 145 MG tablet  Commonly known as:  TRICOR   145 mg, Oral, Daily      fish oil 1000 MG capsule capsule   3,000 mg, Oral, Daily With Breakfast      furosemide 40 MG tablet  Commonly known as:  LASIX   40 mg, Oral, Daily      hydrocortisone 2.5 % cream   1 application, Topical, 2 Times Daily      levothyroxine 175 MCG tablet  Commonly known as:  SYNTHROID, LEVOTHROID   175 mcg, Oral, Daily      lisinopril 40 MG tablet  Commonly known as:  PRINIVIL,ZESTRIL   40 mg, Oral, Daily      metFORMIN 500 MG tablet  Commonly known as:  GLUCOPHAGE   Oral, Take As Directed, 1 tablet in am and 2 tablets in evening      multivitamin with minerals tablet tablet   1 tablet, Oral, Daily       OTEZLA PO   1 capsule, Oral, 2 Times Daily, Unsure if 20 or 30 mg      pravastatin 80 MG tablet  Commonly known as:  PRAVACHOL   80 mg, Oral, Daily      venlafaxine  MG 24 hr capsule  Commonly known as:  EFFEXOR-XR   150 mg, Oral, Daily      Vitamin D3 1000 units capsule   3 capsules, Oral, Daily             No Known Allergies      Discharge Disposition:  Home or Self Care    Discharge Diet:  Diet Order   Procedures   • Diet Regular; Consistent Carbohydrate, Cardiac         Discharge Activity:   Activity Instructions     Activity as Tolerated      Additional Activity Instructions:      Advance activity as tolerated  Waterproof Aquacel dressing in place will remain in place until your follow up appointment  May shower when Arrow pump is removed                       CODE STATUS:    Code Status and Medical Interventions:   Ordered at: 01/21/19 1107     Code Status:    CPR     Medical Interventions (Level of Support Prior to Arrest):    Full         No future appointments.    Additional Instructions for the Follow-ups that You Need to Schedule     Discharge Follow-up with PCP   As directed       Currently Documented PCP:    Alley Finney MD    PCP Phone Number:    [phone]     Follow Up Details:  1 week; please schedule Prempro to patient's discharge         Discharge Follow-up with Specified Provider: 1 Week   As directed      Follow Up:  1 Week         Discharge Follow-up with Specified Provider: Dr Perez;  please schedule appointment on 1/29 prior to patient's discharge   As directed      To:  Dr Perez;  please schedule appointment on 1/29 prior to patient's discharge               Time Spent on Discharge:  45 minutes    Electronically signed by ASHLIE Cason, 01/24/19, 9:26 AM.

## 2019-01-24 NOTE — PLAN OF CARE
Problem: Patient Care Overview  Goal: Plan of Care Review  Outcome: Ongoing (interventions implemented as appropriate)   01/24/19 4316   Plan of Care Review   Progress improving   OTHER   Outcome Summary Pt had an uneventful shift. Pt has had minimal c/o's of pain, and has only req'd one dose PO pain meds. Pt  left early during shift. Pt is able to read lips though. Pt VSS throughout shift, but pt remains on 2L d/t desating. Pt has slept for the majority of the shift. Plan to potentially d/c today. Will continue to monitor.    Coping/Psychosocial   Plan of Care Reviewed With patient       Problem: Shoulder Arthroplasty (Adult)  Goal: Signs and Symptoms of Listed Potential Problems Will be Absent, Minimized or Managed (Shoulder Arthroplasty)  Outcome: Ongoing (interventions implemented as appropriate)      Problem: Fall Risk (Adult)  Goal: Identify Related Risk Factors and Signs and Symptoms  Outcome: Ongoing (interventions implemented as appropriate)    Goal: Absence of Fall  Outcome: Ongoing (interventions implemented as appropriate)

## 2019-01-24 NOTE — PLAN OF CARE
Problem: Patient Care Overview  Goal: Plan of Care Review  Outcome: Outcome(s) achieved Date Met: 01/24/19 01/24/19 0920   Plan of Care Review   Progress improving   OTHER   Outcome Summary Interscalene has been DC, no c/o pain post session.  at bedside, pt desires to be able to complete HEP with independence. Educated her on SROM as SO was previously trained on PROM. She tolerated R shoudler PROM , SROM with pulleys 70; PROM and SROM IR to chest; PROM ER 15 and SROM 20. Pt able to demo to OT correct applicaiton and removal of sling. Pt ambulated 300 feet with supervision, desat to 84% on RA. Recommend DC home with family assist, issued rehab department phone number and encouraged her to call with questions.    Coping/Psychosocial   Plan of Care Reviewed With patient       Problem: Shoulder Arthroplasty (Adult)  Goal: Signs and Symptoms of Listed Potential Problems Will be Absent, Minimized or Managed (Shoulder Arthroplasty)  Outcome: Outcome(s) achieved Date Met: 01/24/19 01/24/19 0920   Goal/Outcome Evaluation   Problems Assessed (Shoulder Arthro) functional deficit   Problems Present (Shoulder Arthro) functional deficit

## 2019-01-24 NOTE — PROGRESS NOTES
Orthopedic Daily Progress Note      CC: s/p right shoulder reverse TSA    Pain well controlled  General: no fevers, chills  Abdomen: no nausea, vomiting, or diarrhea    No other complaints    Physical Exam:  I have reviewed the vital signs.  Temp:  [97.9 °F (36.6 °C)-99.3 °F (37.4 °C)] 99 °F (37.2 °C)  Heart Rate:  [82-95] 85  Resp:  [16-18] 18  BP: (107-153)/(69-94) 153/79    Objective  General Appearance:    Alert, cooperative, no distress  Extremities: No clubbing, cyanosis, or edema to lower extremities  Pulses:  2+ in distal surgical extremity  Skin: Dressing Clean/dry/intact      Results Review:    I have reviewed the labs, radiology results and diagnostic studies:reviewed    Results from last 7 days   Lab Units 01/22/19  0513   WBC 10*3/mm3 10.69   HEMOGLOBIN g/dL 12.3   PLATELETS 10*3/mm3 266     Results from last 7 days   Lab Units 01/22/19  0513   SODIUM mmol/L 139   POTASSIUM mmol/L 3.5   CO2 mmol/L 28.0   CREATININE mg/dL 0.74   GLUCOSE mg/dL 173*       I have reviewed the medications.    Assessment/Problem List  POD# 2 S/p reverse R tsa, with some hypoxia postoperatively requiring oxygen supplementation. Per patient, she has hypoxia at baseline when measured at her PCP. Has not required home 02.      Plan  Hypoxia--per medicine team, appreciate recommendations, likely d/c home on O2  Incentive spirometry  PT/OT while in house  PROM of shoulder only per postop protocol  D/C home when appropriate from medical standpoint  Encourage OOB mobilization   Follow up in Dr Perez's office 1/29 for postop check  Ok to shower with dressing in place; do not remove until follow up        Discharge Planning: I expect patient to be discharged to home in 0-1 days.    Jo Ann Lemus MD  01/24/19  7:57 AM

## 2019-01-24 NOTE — THERAPY DISCHARGE NOTE
Acute Care - Occupational Therapy Treatment Note/Discharge   [place]     Patient Name: Taya Lara  : 1950  MRN: [id number]  Today's Date: 2019  Onset of Illness/Injury or Date of Surgery: 19  Date of Referral to OT: 19  Referring Physician: Dr. Perez      Admit Date: 2019    Visit Dx:     ICD-10-CM ICD-9-CM   1. Impaired mobility and ADLs Z74.09 799.89     Patient Active Problem List   Diagnosis   • Status post reverse arthroplasty of right shoulder   • Type 2 diabetes mellitus (CMS/HCC)   • Hypothyroidism (acquired)   • HLD (hyperlipidemia)   • Essential hypertension   • Bilateral deafness       Therapy Treatment    Rehabilitation Treatment Summary     Row Name 19 0920             Treatment Time/Intention    Discipline  occupational therapist  -AR      Document Type  therapy note (daily note);discharge treatment POD#3 right RTSA  -AR      Subjective Information  no complaints  -AR      Mode of Treatment  occupational therapy  -AR      Patient/Family Observations  pt supine,  at bedside and pt anticiaptes DC home today  -AR      Patient Effort  good  -AR      Comment  desaturation to 84% on RA   -AR      Existing Precautions/Restrictions  fall;right;shoulder;non-weight bearing;other (see comments)  (Significant)  Donjoy Ultra II sling, interscalene DC- premedicate as neede  -AR      Treatment Considerations/Comments  pt is deaf, see with   (Significant)   -AR      Equipment Issued to Patient  bathing equipment long-handles sponge and pulleys  -AR      Recorded by [AR] Ale Sawyer, OT 19 1256      Row Name 19 0920             Vital Signs    Pre SpO2 (%)  89  -AR      O2 Delivery Pre Treatment  room air  -AR      Intra SpO2 (%)  84  -AR      O2 Delivery Intra Treatment  room air RN notified, recover to 88% with PLB in  < 1 minute  -AR      Post SpO2 (%)  90  -AR      O2 Delivery Post Treatment  room air  -AR      Pre Patient  Position  Supine  -AR      Intra Patient Position  Standing  -AR      Post Patient Position  Sitting  -AR      Recorded by [AR] Ale Sawyer, OT 01/24/19 1256      Row Name 01/24/19 0920             Cognitive Assessment/Intervention    Additional Documentation  Cognitive Assessment/Intervention (Group)  -AR      Recorded by [AR] Ale Sawyer, OT 01/24/19 1256      Row Name 01/24/19 0920             Cognitive Assessment/Intervention- PT/OT    Affect/Mental Status (Cognitive)  WNL  -AR      Orientation Status (Cognition)  oriented x 4  -AR      Follows Commands (Cognition)  WNL  -AR      Cognitive Function (Cognitive)  WNL  -AR      Personal Safety Interventions  fall prevention program maintained  -AR      Recorded by [AR] Ale Sawyer, SARINA 01/24/19 1256      Row Name 01/24/19 0920             Safety Issues, Functional Mobility    Impairments Affecting Function (Mobility)  endurance/activity tolerance;shortness of breath;other (see comments) right shoulder precautions  -AR      Recorded by [AR] Ale Sawyer, SARINA 01/24/19 1256      Row Name 01/24/19 0920             Mobility Assessment/Intervention    Extremity Weight-bearing Status  right upper extremity  -AR      Right Upper Extremity (Weight-bearing Status)  non weight-bearing (NWB)  -AR      Recorded by [AR] Ale Sawyer, OT 01/24/19 1256      Row Name 01/24/19 0920             Bed Mobility Assessment/Treatment    Bed Mobility Assessment/Treatment  scooting/bridging;supine-sit  -AR      Scooting/Bridging [place] (Bed Mobility)  supervision;verbal cues  -AR      Supine-Sit [place] (Bed Mobility)  supervision;verbal cues  -AR      Bed Mobility, Safety Issues  decreased use of arms for pushing/pulling  -AR      Assistive Device (Bed Mobility)  head of bed elevated  -AR      Comment (Bed Mobility)  Good ability to maintain NWB RUE AAT, reviewed safe sleeping positions  -AR      Recorded by [AR] Ale Sawyer, OT  01/24/19 1256      Row Name 01/24/19 0920             Functional Mobility    Functional Mobility- Ind. Level  supervision required  -AR      Functional Mobility-Distance (Feet)  300  -AR      Functional Mobility- Comment  No dyspnea, desaturation to 84% on RA. NO LOB episodes noted  -AR      Recorded by [AR] Ale Sawyer OT 01/24/19 1256      Row Name 01/24/19 0920             Transfer Assessment/Treatment    Transfer Assessment/Treatment  sit-stand transfer;stand-sit transfer  -AR      Maintains Weight-bearing Status (Transfers)  able to maintain  -AR      Comment (Transfers)  Good safety awareness  -AR      Recorded by [AR] Ale Sawyer OT 01/24/19 1256      Row Name 01/24/19 0920             Sit-Stand Transfer    Sit-Stand [place] (Transfers)  independent  -AR      Recorded by [AR] Ale Sawyer OT 01/24/19 1256      Row Name 01/24/19 0920             Stand-Sit Transfer    Stand-Sit [place] (Transfers)  independent  -AR      Recorded by [AR] Ale Sawyer OT 01/24/19 1256      Row Name 01/24/19 0920             ADL Assessment/Intervention    14606 - OT Self Care/Mgmt Minutes  62  -AR      BADL Assessment/Intervention  upper body dressing;lower body dressing;bathing;grooming  -AR      Recorded by [AR] Ale Sawyer OT 01/24/19 1256      Row Name 01/24/19 0920             Bathing Assessment/Intervention    Bathing [place] Level  maximum assist (25% patient effort);upper body;lower body;standby assist  -AR      Bathing Position  edge of bed sitting;unsupported standing  -AR      Comment (Bathing)  OT reviewed right shoulder precautions and right axilla care to maintain, pt required max assist to complete.   -AR      Recorded by [AR] Ale Sawyer OT 01/24/19 1256      Row Name 01/24/19 0920             Upper Body Dressing Assessment/Training    Upper Body Dressing [place] Level  doff;front opening garment;don;pajama/robe;maximum assist (25% patient  effort) able to don sling with mod assist, doff min assist  -AR      Assistive Devices (Upper Body Dressing)  one hand technique  -AR      Upper Body Dressing Position  edge of bed sitting;supported sitting  -AR      Comment (Upper Body Dressing)  OT reviewed right shoulder precautions, ADL retraining to maintain, sling management including application/wear schedule and proper fit. SO not present, howeer post-teaching pt able to instruct c/g on correct application.   -AR      Recorded by [AR] Ale Sawyer OT 01/24/19 1256      Row Name 01/24/19 0920             Lower Body Dressing Assessment/Training    Lower Body Dressing [place] Level  don;undergarment;pants/bottoms;moderate assist (50% patient effort)  -AR      Assistive Devices (Lower Body Dressing)  one hand technique  -AR      Lower Body Dressing Position  edge of bed sitting;unsupported standing  -AR      Comment (Lower Body Dressing)  OT issued reacher to assist with LB dressing and educated pt on use  -AR      Recorded by [AR] Ale Sawyer OT 01/24/19 1256      Row Name 01/24/19 0920             Grooming Assessment/Training    [place] Level (Grooming)  hair care, combing/brushing;supervision  -AR      Grooming Position  unsupported standing  -AR      Recorded by [AR] Ale Sawyer OT 01/24/19 1256      Row Name 01/24/19 0920             BADL Safety/Performance    Impairments, BADL Safety/Performance  pain;endurance/activity tolerance R shoulder precautions  -AR      Skilled BADL Treatment/Intervention  adaptive equipment training;BADL process/adaptation training;compensatory training;energy conservation;alba/one-hand technique  -AR      Progress in BADL Status  improvement noted  -AR      Recorded by [AR] Ale Sawyer OT 01/24/19 1256      Row Name 01/24/19 0920             Motor Skills Assessment/Interventions    Additional Documentation  Therapeutic Exercise (Group);Therapeutic Exercise Interventions (Group)  -AR       Recorded by [AR] Ale Sawyer, OT 01/24/19 1256      Row Name 01/24/19 0920             Therapeutic Exercise    04226 - OT Therapeutic Exercise Minutes  46  -AR      54735 - OT Therapeutic Activity Minutes  13  -AR      Recorded by [AR] Ale Sawyer OT 01/24/19 1256      Row Name 01/24/19 0920             Therapeutic Exercise    Upper Extremity Range of Motion (Therapeutic Exercise)  shoulder flexion/extension, right;shoulder internal/external rotation, right;elbow flexion/extension, right;forearm supination/pronation, right;wrist flexion/extension, right scapular retractions- bilat  -AR      Hand (Therapeutic Exercise)  finger flexion/extension, right  -AR      Exercise Type (Therapeutic Exercise)  AROM (active range of motion);PROM (passive range of motion) AROM scap/hand/wrtist/FA/elbow PROM FE/IR/ER  -AR      Position (Therapeutic Exercise)  supine;standing standing level pulleys  -AR      Sets/Reps (Therapeutic Exercise)  1/10  -AR      Comment (Therapeutic Exercise)  Pt reluctant to allow SO to assist with HEP at home. OT educated her on SROM for IR/ER using LH sponge and FE with pulleys. Pt able to complete both with supervision. She tolerated right shoulder PROM , SROM 80; PROM IR chest- SROM IR chest, PROM ER 15, SROM 20. Issued rehab department phone number and pt states she can call through with a video conference to reach and comunicate with OT post DC if questions arise.   -AR      Recorded by [AR] Ale Sawyer OT 01/24/19 1256      Row Name 01/24/19 0920             Balance    Balance  static sitting balance;static standing balance  -AR      Recorded by [AR] Ale Sawyer OT 01/24/19 1256      Row Name 01/24/19 0920             Static Sitting Balance    Level of [place] (Unsupported Sitting, Static Balance)  independent  -AR      Sitting Position (Unsupported Sitting, Static Balance)  sitting on edge of bed  -AR      Time Able to Maintain Position  (Unsupported Sitting, Static Balance)  more than 5 minutes  -AR      Recorded by [AR] Ale Sawyer, OT 01/24/19 1256      Row Name 01/24/19 0920             Dynamic Sitting Balance    Level of [place], Reaches Outside Midline (Sitting, Dynamic Balance)  independent  -AR      Sitting Position, Reaches Outside Midline (Sitting, Dynamic Balance)  sitting on edge of bed  -AR      Recorded by [AR] Ale Sawyer, OT 01/24/19 1256      Row Name 01/24/19 0920             Static Standing Balance    Level of [place] (Supported Standing, Static Balance)  independent  -AR      Time Able to Maintain Position (Supported Standing, Static Balance)  3 to 4 minutes  -AR      Recorded by [AR] Ale Sawyer, OT 01/24/19 1256      Row Name 01/24/19 0920             Fine Motor Testing & Training    Comment, Fine Motor Coordination  R oppositon intact  -AR      Recorded by [AR] Ale Sawyer, SARINA 01/24/19 1256      Row Name 01/24/19 0920             Positioning and Restraints    Pre-Treatment Position  in bed  -AR      Post Treatment Position  chair  -AR      In Chair  notified nsg;reclined;call light within reach;exit alarm on;encouraged to call for assist;legs elevated  -AR      Recorded by [AR] Ale Sawyer, OT 01/24/19 1256      Row Name 01/24/19 0920             Pain Assessment    Additional Documentation  Pain Scale: Numbers Pre/Post-Treatment (Group)  -AR      Recorded by [AR] Ale Sawyer, OT 01/24/19 1256      Row Name 01/24/19 0920             Pain Scale: Numbers Pre/Post-Treatment    Pain Scale: Numbers, Pretreatment  3/10  -AR      Pain Scale: Numbers, Post-Treatment  0/10 - no pain  -AR      Pain Location - Side  Right  -AR      Pain Location - Orientation  generalized  -AR      Pain Location  shoulder  -AR      Pain Intervention(s)  Repositioned;Ambulation/increased activity  -AR      Recorded by [AR] Ale Sawyer, OT 01/24/19 1256      Row Name 01/24/19 0920              Sensory Assessment/Intervention    Sensory General Assessment  light touch sensation deficits identified  -AR      Recorded by [AR] Ale Sawyer, OT 01/24/19 1256      Row Name 01/24/19 0920             Vision Assessment/Intervention    Visual Impairment/Limitations  WNL  -AR      Recorded by [AR] Ale Sawyer, OT 01/24/19 1256      Row Name 01/24/19 0920             Light Touch Sensation Assessment    Left Upper Extremity: Light Touch Sensation Assessment  intact  -AR      Right Upper Extremity: Light Touch Sensation Assessment  mild impairment, 75% or more correct responses  -AR      Recorded by [AR] Ale Sawyer, OT 01/24/19 1256      Row Name 01/24/19 0920             Orthotic/Prosthetic Management    Orthosis Location  upper extremity orthosis  -AR      Recorded by [AR] Ale Sawyer, OT 01/24/19 1256      Row Name 01/24/19 0920             Upper Extremity Orthosis Management    Type (Upper Extremity Orthosis)  Donjoy Ultra II sling  -AR      Functional Design (Upper Extremity Orthosis)  static orthosis  -AR      Therapeutic Indications (Upper Extremity Orthosis)  post-op positioning/protection;stabilization and support  -AR      Wearing Schedule (Upper Extremity Orthosis)  remove for exercise;remove for hygiene/bathing  -AR      Orthosis Training (Upper Extremity Orthosis)  patient;all orthosis skills;activity limitations/precautions;cleaning/care of orthosis;donning/doffing orthosis;orthosis adjustment;orthosis maintenance;purpose/goals of orthosis;sensory precautions;skin inspection/precautions;sleeping precautions;wearing schedule;able to show back training;other (see comments) able to gesture to OT correct placement of straps  -AR      Compliance/Wearing Issues (Upper Extremity Orthosis)  patient/caregiver comprehend strategies  -AR      Recorded by [AR] Ale Sawyer, OT 01/24/19 1256      Row Name                Wound 01/21/19 0804 Right shoulder incision    Wound -  Properties Group Date first assessed: 01/21/19 [KS] Time first assessed: 0825 [KS] Side: Right [KS] Location: shoulder [KS] Type: incision [KS] Recorded by:  [KS] Martha Mcrae RN 01/21/19 0825    Row Name 01/24/19 0920             Plan of Care Review    Plan of Care Reviewed With  patient  -AR      Recorded by [AR] Ale Sawyer OT 01/24/19 1256      Row Name 01/24/19 0920             Outcome Summary/Treatment Plan (OT)    Daily Summary of Progress (OT)  progress toward functional goals as expected;prepare for discharge  -AR      Anticipated Discharge Disposition (OT)  home with assist  -AR      Reason for Discharge (OT Discharge Summary)  patient discharged from this facility  -AR      Recorded by [AR] Ale Sawyer OT 01/24/19 1256        User Key  (r) = Recorded By, (t) = Taken By, (c) = Cosigned By    Initials Name Effective Dates Discipline    Ale Hyatt OT 06/22/15 -  OT    KS Martha Mcrae RN 06/16/16 -  Nurse        Wound 01/21/19 0825 Right shoulder incision (Active)   Dressing Appearance dry;intact;no drainage 1/24/2019  9:30 AM   Closure MIKAELA 1/23/2019  9:52 PM   Base dressing in place, unable to visualize 1/23/2019  9:52 PM   Drainage Amount none 1/23/2019  9:52 PM       Rehab Goal Summary     Row Name 01/24/19 0920             Bed Mobility Goal 1 (OT)    Progress/Outcomes (Bed Mobility Goal 1, OT)  goal met  -AR         Transfer Goal 1 (OT)    Progress/Outcome (Transfer Goal 1, OT)  goal met  -AR         Dressing Goal 1 (OT)    Progress/Outcome (Dressing Goal 1, OT)  goal not met  -AR         ROM Goal 1 (OT)    Progress/Outcome (ROM Goal 1, OT)  goal met  -AR        User Key  (r) = Recorded By, (t) = Taken By, (c) = Cosigned By    Initials Name Provider Type Discipline    Ale Hyatt, OT Occupational Therapist OT          Occupational Therapy Education     Title: PT OT SLP Therapies (In Progress)     Topic: Occupational Therapy (In Progress)     Point: ADL  training (In Progress)     Description: Instruct learner(s) on proper safety adaptation and remediation techniques during self care or transfers.   Instruct in proper use of assistive devices.    Learning Progress Summary           Patient Eager, TB,D,E,H,I, DU by AR at 1/24/2019 12:56 PM    Acceptance, E,TB,D, NR by AR at 1/22/2019 10:45 AM    Eager, E,TB,D, NR,VU by AR at 1/21/2019  1:39 PM   Significant Other Acceptance, E,TB,D, NR by AR at 1/22/2019 10:45 AM   Caregiver Acceptance, D,TB,E, VU,NR by HK at 1/23/2019  3:20 PM    Comment:   educated on sling management as well as wear and care, shoulder precautions, axilla care, and alba dressing. Educated on safety precautions and appropriate body mechanics.                   Point: Home exercise program (In Progress)     Description: Instruct learner(s) on appropriate technique for monitoring, assisting and/or progressing therapeutic exercises/activities.    Learning Progress Summary           Patient Eager, TB,D,E,H,I, DU by AR at 1/24/2019 12:56 PM    Acceptance, E,TB,D, NR by AR at 1/22/2019 10:45 AM    Eager, E,TB,D, NR,VU by AR at 1/21/2019  1:39 PM   Significant Other Acceptance, E,TB,D, NR by AR at 1/22/2019 10:45 AM   Caregiver Acceptance, D,TB,E, VU,NR by HK at 1/23/2019  3:20 PM    Comment:   educated on sling management as well as wear and care, shoulder precautions, axilla care, and alba dressing. Educated on safety precautions and appropriate body mechanics.                   Point: Precautions (In Progress)     Description: Instruct learner(s) on prescribed precautions during self-care and functional transfers.    Learning Progress Summary           Patient Eager, TB,D,E,H,I, DU by AR at 1/24/2019 12:56 PM    Acceptance, E,TB,D, NR by AR at 1/22/2019 10:45 AM    Eager, E,TB,D, NR,VU by AR at 1/21/2019  1:39 PM   Significant Other Acceptance, E,TB,D, NR by AR at 1/22/2019 10:45 AM   Caregiver Acceptance, D,TB,E, VU,NR by  at 1/23/2019   3:20 PM    Comment:   educated on sling management as well as wear and care, shoulder precautions, axilla care, and alba dressing. Educated on safety precautions and appropriate body mechanics.                   Point: Body mechanics (In Progress)     Description: Instruct learner(s) on proper positioning and spine alignment during self-care, functional mobility activities and/or exercises.    Learning Progress Summary           Patient Eager, TB,D,E,H,I, DU by AR at 1/24/2019 12:56 PM    Acceptance, E,TB,D, NR by AR at 1/22/2019 10:45 AM    Eager, E,TB,D, NR,VU by AR at 1/21/2019  1:39 PM   Significant Other Acceptance, E,TB,D, NR by AR at 1/22/2019 10:45 AM   Caregiver Acceptance, D,TB,E, VU,NR by HK at 1/23/2019  3:20 PM    Comment:   educated on sling management as well as wear and care, shoulder precautions, axilla care, and alba dressing. Educated on safety precautions and appropriate body mechanics.                               User Key     Initials Effective Dates Name Provider Type Discipline    AR 06/22/15 -  Ale Sawyer, OT Occupational Therapist OT     03/07/18 -  Lou Malave OT Occupational Therapist OT                OT Recommendation and Plan  Outcome Summary/Treatment Plan (OT)  Daily Summary of Progress (OT): progress toward functional goals as expected, prepare for discharge  Anticipated Discharge Disposition (OT): home with assist  Reason for Discharge (OT Discharge Summary): patient discharged from this facility  Therapy Frequency (OT Eval): daily  Daily Summary of Progress (OT): progress toward functional goals as expected, prepare for discharge  Plan of Care Review  Plan of Care Reviewed With: patient  Plan of Care Reviewed With: patient  Outcome Summary: Interscalene has been DC, no c/o pain post session.  at bedside, pt desires to be able to complete HEP with independence. Educated her on SROM as SO was previously trained on PROM. She tolerated R  munira PROM , SROM with pulleys 70; PROM and SROM IR to chest; PROM ER 15 and SROM 20. Pt able to demo to OT correct applicaiton and removal of sling. Pt ambulated 300 feet with supervision, desat to 84% on RA. Recommend DC home with family assist, issued rehab department phone number and encouraged her to call with questions.     Outcome Measures     Row Name 01/24/19 0920 01/23/19 1350 01/22/19 1045       How much help from another is currently needed...    Putting on and taking off regular lower body clothing?  2  -AR  2  -HK  2  -AR    Bathing (including washing, rinsing, and drying)  2  -AR  3  -HK  3  -AR    Toileting (which includes using toilet bed pan or urinal)  3  -AR  3  -HK  3  -AR    Putting on and taking off regular upper body clothing  2  -AR  2  -HK  2  -AR    Taking care of personal grooming (such as brushing teeth)  3  -AR  3  -HK  3  -AR    Eating meals  3  -AR  3  -HK  3  -AR    Score  15  -AR  16  -HK  16  -AR       Functional Assessment    Outcome Measure Options  AM-PAC 6 Clicks Daily Activity (OT)  -AR  AM-PAC 6 Clicks Daily Activity (OT)  -HK  AM-PAC 6 Clicks Daily Activity (OT)  -AR    Row Name 01/21/19 1339             How much help from another is currently needed...    Putting on and taking off regular lower body clothing?  2  -AR      Bathing (including washing, rinsing, and drying)  2  -AR      Toileting (which includes using toilet bed pan or urinal)  3  -AR      Putting on and taking off regular upper body clothing  2  -AR      Taking care of personal grooming (such as brushing teeth)  3  -AR      Eating meals  3  -AR      Score  15  -AR         Functional Assessment    Outcome Measure Options  AM-PAC 6 Clicks Daily Activity (OT)  -AR        User Key  (r) = Recorded By, (t) = Taken By, (c) = Cosigned By    Initials Name Provider Type    Ale Hyatt, OT Occupational Therapist    HK Lou Malave, OT Occupational Therapist           Time Calculation:    Time  Calculation- OT     Row Name 01/24/19 0920             Time Calculation- OT    OT Start Time  0920  -AR      Total Timed Code Minutes- OT  121 minute(s)  -AR      OT Received On  01/24/19  -AR      OT Goal Re-Cert Due Date  01/31/19  -AR         Timed Charges    11055 - OT Therapeutic Exercise Minutes  46  -AR      22768 - OT Therapeutic Activity Minutes  13  -AR      47555 - OT Self Care/Mgmt Minutes  62  -AR        User Key  (r) = Recorded By, (t) = Taken By, (c) = Cosigned By    Initials Name Provider Type    AR Ale Sawyer OT Occupational Therapist        Therapy Suggested Charges     Code   Minutes Charges    28064 (CPT®) Hc Ot Neuromusc Re Education Ea 15 Min      24702 (CPT®) Hc Ot Ther Proc Ea 15 Min 46 3    83870 (CPT®) Hc Ot Therapeutic Act Ea 15 Min 13 1    95033 (CPT®) Hc Ot Manual Therapy Ea 15 Min      65637 (CPT®) Hc Ot Iontophoresis Ea 15 Min      17945 (CPT®) Hc Ot Elec Stim Ea-Per 15 Min      77878 (CPT®) Hc Ot Ultrasound Ea 15 Min      13474 (CPT®) Hc Ot Self Care/Mgmt/Train Ea 15 Min 62 4    Total  121 8        Therapy Charges for Today     Code Description Service Date Service Provider Modifiers Qty    78270032150 HC OT THER PROC EA 15 MIN 1/24/2019 Ale Sawyer OT GO 3    42012909541 HC OT THERAPEUTIC ACT EA 15 MIN 1/24/2019 Ale Sawyer OT GO 1    64878030116 HC OT SELF CARE/MGMT/TRAIN EA 15 MIN 1/24/2019 Ale Sawyer OT GO 4    46495010277 HC OT THER SUPP EA 15 MIN 1/24/2019 Ale Sawyer OT GO 2               OT Discharge Summary  Anticipated Discharge Disposition (OT): home with assist  Reason for Discharge: Discharge from facility  Outcomes Achieved: Patient able to partially acheive established goals  Discharge Destination: Home with assist    Ale Sawyer OT  1/24/2019

## 2019-01-25 ENCOUNTER — READMISSION MANAGEMENT (OUTPATIENT)
Dept: CALL CENTER | Facility: HOSPITAL | Age: 69
End: 2019-01-25

## 2019-01-25 NOTE — OUTREACH NOTE
Prep Survey      Responses   Facility patient discharged from?  [facility]   Is patient eligible?  Yes   Discharge diagnosis  Status post reverse arthroplasty of right shoulder    Does the patient have one of the following disease processes/diagnoses(primary or secondary)?  Total Joint Replacement   Does the patient have Home health ordered?  No   Is there a DME ordered?  No   General alerts for this patient  bilalteral deafness    Prep survey completed?  Yes          Deisi Canales RN

## 2019-01-28 ENCOUNTER — READMISSION MANAGEMENT (OUTPATIENT)
Dept: CALL CENTER | Facility: HOSPITAL | Age: 69
End: 2019-01-28

## 2019-01-28 NOTE — OUTREACH NOTE
Total Joint Week 1 Survey      Responses   Facility patient discharged from?  [facility]   Does the patient have one of the following disease processes/diagnoses(primary or secondary)?  Total Joint Replacement   Is there a successful TCM telephone encounter documented?  No   Joint surgery performed?  Shoulder   Week 1 attempt successful?  No   Unsuccessful attempts  Attempt 1          Shelbi Whittington RN

## 2019-01-29 ENCOUNTER — READMISSION MANAGEMENT (OUTPATIENT)
Dept: CALL CENTER | Facility: HOSPITAL | Age: 69
End: 2019-01-29

## 2019-01-29 NOTE — OUTREACH NOTE
Total Joint Week 1 Survey      Responses   Facility patient discharged from?  [facility]   Does the patient have one of the following disease processes/diagnoses(primary or secondary)?  Total Joint Replacement   Is there a successful TCM telephone encounter documented?  No   Joint surgery performed?  Shoulder   Week 1 attempt successful?  Yes   Call start time  1015   Call end time  1027   General alerts for this patient  bilalteral deafness    Discharge diagnosis  Status post reverse arthroplasty of right shoulder    Is patient permission given to speak with other caregiver?  Yes   List who call center can speak with  friend or dtr   Person spoke with today (if not patient) and relationship  Mr Solorio her friend and her dtr who was signing with pt.    Does the patient have all medications related to this admission filled (includes all antibiotics, pain medications, etc.)  Yes   Is the patient taking all medications as directed (includes completed medication regime)?  Yes   Is the patient able to teach back alternate methods of pain control?  Ice, Shoulder-elevate above heart/ keep in sling as advised, Reposition   Medication comments  Pt's pain at worst has been 3/10.    Does the patient have a follow up appointment with their surgeon?  Yes   Has the patient kept scheduled appointments due by today?  N/A   Psychosocial issues?  No   Has the patient began therapy sessions (either in the home or as an out patient)?  No   Does the patient have a wound vac in place?  N/A   Has the patient fallen since discharge?  No   If the patient has fallen, were there any injuries?  No   Did the patient receive a copy of their discharge instructions?  Yes   Nursing interventions  Reviewed instructions with patient   What is the patient's perception of their functional status since discharge?  Improving   Is the patient able to teach back signs and symptoms of infection?  Temp >100.4 for 24h or longer, Blisters around incision,  Severe discomfort or pain   Is the patient able to teach back how to prevent infection?  Wash hands before and after touching incision, Keep incision covered if pets in house, Keep incision covered if drainage, No lotion or creams   Is the patient able to teach back signs and symptoms of DVT?  Redness in calf, Severe pain in calf, Swelling in calf, Shortness of breath or chest pain   Is the patient able to teach back home safety measures?  Accessibility to necessary areas in home, Modifications with ADLs such as dressing, cooking, toileting   Did the patient implement home safety suggestions from pre-surgery classes if attended?  N/A   If the patient is a current smoker, are they able to teach back resources for cessation?  -- [quit 12 yrs ago. ]   Is the patient/caregiver able to teach back the hierarchy of who to call/visit for symptoms/problems? PCP, Specialist, Home health nurse, Urgent Care, ED, 911  Yes   Week 1 call completed?  Yes          Estefania Yarbrough RN

## 2019-02-07 ENCOUNTER — HOSPITAL ENCOUNTER (EMERGENCY)
Facility: HOSPITAL | Age: 69
Discharge: HOME | End: 2019-02-07
Attending: EMERGENCY MEDICINE | Admitting: EMERGENCY MEDICINE

## 2019-02-07 VITALS
DIASTOLIC BLOOD PRESSURE: 82 MMHG | SYSTOLIC BLOOD PRESSURE: 160 MMHG | RESPIRATION RATE: 16 BRPM | OXYGEN SATURATION: 95 % | TEMPERATURE: 98.5 F | HEART RATE: 98 BPM | BODY MASS INDEX: 33.13 KG/M2 | HEIGHT: 63 IN | WEIGHT: 187 LBS

## 2019-02-07 DIAGNOSIS — T78.3XXA ANGIOEDEMA, INITIAL ENCOUNTER: Primary | ICD-10-CM

## 2019-02-07 LAB
ABO GROUP BLD: NORMAL
ABO GROUP BLD: NORMAL
BLD GP AB SCN SERPL QL: NEGATIVE
HOLD SPECIMEN: NORMAL
HOLD SPECIMEN: NORMAL
RH BLD: POSITIVE
RH BLD: POSITIVE
T&S EXPIRATION DATE: NORMAL
WHOLE BLOOD HOLD SPECIMEN: NORMAL
WHOLE BLOOD HOLD SPECIMEN: NORMAL

## 2019-02-07 PROCEDURE — 99284 EMERGENCY DEPT VISIT MOD MDM: CPT

## 2019-02-07 PROCEDURE — P9017 PLASMA 1 DONOR FRZ W/IN 8 HR: HCPCS

## 2019-02-07 PROCEDURE — 86901 BLOOD TYPING SEROLOGIC RH(D): CPT | Performed by: EMERGENCY MEDICINE

## 2019-02-07 PROCEDURE — 86900 BLOOD TYPING SEROLOGIC ABO: CPT

## 2019-02-07 PROCEDURE — 86901 BLOOD TYPING SEROLOGIC RH(D): CPT

## 2019-02-07 PROCEDURE — 96375 TX/PRO/DX INJ NEW DRUG ADDON: CPT

## 2019-02-07 PROCEDURE — 86900 BLOOD TYPING SEROLOGIC ABO: CPT | Performed by: EMERGENCY MEDICINE

## 2019-02-07 PROCEDURE — 86927 PLASMA FRESH FROZEN: CPT

## 2019-02-07 PROCEDURE — 96374 THER/PROPH/DIAG INJ IV PUSH: CPT

## 2019-02-07 PROCEDURE — 99283 EMERGENCY DEPT VISIT LOW MDM: CPT

## 2019-02-07 PROCEDURE — 25010000002 METHYLPREDNISOLONE PER 125 MG: Performed by: EMERGENCY MEDICINE

## 2019-02-07 PROCEDURE — 86850 RBC ANTIBODY SCREEN: CPT | Performed by: EMERGENCY MEDICINE

## 2019-02-07 PROCEDURE — 36430 TRANSFUSION BLD/BLD COMPNT: CPT

## 2019-02-07 PROCEDURE — 25010000002 DIPHENHYDRAMINE PER 50 MG: Performed by: EMERGENCY MEDICINE

## 2019-02-07 RX ORDER — DIPHENHYDRAMINE HYDROCHLORIDE 50 MG/ML
50 INJECTION INTRAMUSCULAR; INTRAVENOUS ONCE
Status: COMPLETED | OUTPATIENT
Start: 2019-02-07 | End: 2019-02-07

## 2019-02-07 RX ORDER — FAMOTIDINE 10 MG/ML
40 INJECTION, SOLUTION INTRAVENOUS ONCE
Status: COMPLETED | OUTPATIENT
Start: 2019-02-07 | End: 2019-02-07

## 2019-02-07 RX ORDER — METHYLPREDNISOLONE SODIUM SUCCINATE 125 MG/2ML
125 INJECTION, POWDER, LYOPHILIZED, FOR SOLUTION INTRAMUSCULAR; INTRAVENOUS ONCE
Status: COMPLETED | OUTPATIENT
Start: 2019-02-07 | End: 2019-02-07

## 2019-02-07 RX ORDER — PREDNISONE 20 MG/1
20 TABLET ORAL DAILY
Qty: 4 TABLET | Refills: 0 | Status: SHIPPED | OUTPATIENT
Start: 2019-02-07 | End: 2020-02-10

## 2019-02-07 RX ADMIN — DIPHENHYDRAMINE HYDROCHLORIDE 50 MG: 50 INJECTION INTRAMUSCULAR; INTRAVENOUS at 06:35

## 2019-02-07 RX ADMIN — METHYLPREDNISOLONE SODIUM SUCCINATE 125 MG: 125 INJECTION, POWDER, FOR SOLUTION INTRAMUSCULAR; INTRAVENOUS at 06:31

## 2019-02-07 RX ADMIN — FAMOTIDINE 40 MG: 10 INJECTION, SOLUTION INTRAVENOUS at 06:32

## 2019-02-07 NOTE — ED PROVIDER NOTES
Subjective   68-year-old female presenting with lip swelling.  She states that yesterday evening noted that her left thumb felt swollen and itchy.  About 7 hours ago woke up and felt like her lips were swollen, called her daughter who brought her here for evaluation.  She denies any trouble swallowing or difficulty breathing.  Has noted a couple areas of hives as well.  They do note that she started a new blood pressure medicine, Entresto, yesterday.  This was to replace lisinopril that she had been on for several years.  No other suspicious intake or exposures.            Review of Systems   Constitutional: Negative.    HENT: Positive for facial swelling.    Eyes: Negative.    Respiratory: Negative.    Cardiovascular: Negative.    Gastrointestinal: Negative.    Genitourinary: Negative.    Musculoskeletal: Negative.    Skin: Positive for rash.   Neurological: Negative.    Psychiatric/Behavioral: Negative.        Past Medical History:   Diagnosis Date   • Arthritis    • Cataract     unsure which eye, mild   • Deaf    • Diabetes mellitus (CMS/HCC)     doesnt check sugar often    • Disease of thyroid gland    • Heartburn     occasionally   • History of kidney stones    • Hyperlipidemia    • Hypertension    • Kidney disorder     saw doctor and states dr said (left?) kidney swollen but pt had no s/s- following up with nephro to check out later    • Pacemaker     [employer] - card on chart - home monitoring    • Psoriasis    • Wears dentures     upper    • Wears glasses        No Known Allergies    Past Surgical History:   Procedure Laterality Date   • CHOLECYSTECTOMY     • COLONOSCOPY     • ENDOSCOPY     • PACEMAKER IMPLANTATION     • TOTAL SHOULDER ARTHROPLASTY W/ DISTAL CLAVICLE EXCISION Right 1/21/2019    Procedure: TOTAL SHOULDER REVERSE ARTHROPLASTY WITH BICEP TENODESIS RIGHT;  Surgeon: Carl Perez MD;  Location: [facility];  Service: Orthopedics   • TUBAL ABDOMINAL LIGATION         History reviewed.  No pertinent family history.    Social History     Socioeconomic History   • Marital status: Single     Spouse name: Not on file   • Number of children: Not on file   • Years of education: Not on file   • Highest education level: Not on file   Tobacco Use   • Smoking status: Former Smoker     Packs/day: 1.00     Years: 19.00     Pack years: 19.00     Types: Cigarettes     Last attempt to quit: 2004     Years since quitting: 15.1   • Smokeless tobacco: Never Used   Substance and Sexual Activity   • Alcohol use: No   • Drug use: No   • Sexual activity: Defer           Objective   Physical Exam   Constitutional: She is oriented to person, place, and time. She appears well-developed and well-nourished. No distress.   HENT:   Head: Normocephalic and atraumatic.   Right Ear: External ear normal.   Left Ear: External ear normal.   Nose: Nose normal.   Mouth/Throat: Oropharynx is clear and moist.   Lower lip is swollen, more on the right than the left, no oropharyngeal swelling   Eyes: Conjunctivae and EOM are normal. Pupils are equal, round, and reactive to light.   Neck: Normal range of motion. Neck supple.   Cardiovascular: Normal rate, regular rhythm, normal heart sounds and intact distal pulses.   Pulmonary/Chest: Effort normal and breath sounds normal. No respiratory distress.   Abdominal: Soft. Bowel sounds are normal. She exhibits no distension. There is no tenderness. There is no rebound and no guarding.   Musculoskeletal: Normal range of motion. She exhibits no edema, tenderness or deformity.   Neurological: She is alert and oriented to person, place, and time.   Skin: Skin is warm and dry.   A few scattered urticaria, scattered erythematous scaly rash consistent with her known psoriasis   Psychiatric: She has a normal mood and affect. Her behavior is normal.   Nursing note and vitals reviewed.      Procedures           ED Course                  MDM  Number of Diagnoses or Management Options  Angioedema, initial  encounter:   Diagnosis management comments: 68-year-old female presenting with lip swelling.  Well-developed, well-nourished elderly female in no distress with exam as above.  Based on her medications I suspect this may be angioedema related to bradykinin release.  We'll treat with steroids, Pepcid, Benadryl and FFP.  We'll monitor for improvement.  Disposition pending observation.    DDX: Angioedema, allergic reaction, urticaria        Final diagnoses:   Angioedema, initial encounter     Patient was signed out to me from previous physician.  He was concerning for angioedema.  Patient been given Benadryl, Pepcid, Solu-Medrol.  Patient waiting on FFP.  Patient received total of 2 units of FFP.  On my exam patient symptoms significantly improving.  Given the location I do have concern for angioedema.  We'll have patient discontinue any ACE inhibitor therapy.  Return precautions discussed.       Yesenia Randolph MD  02/07/19 [id number]

## 2019-02-07 NOTE — DISCHARGE INSTRUCTIONS
Please do not take lisinopril or any other ACE inhibitor in the future as you could have a similar reaction.

## 2019-02-08 ENCOUNTER — READMISSION MANAGEMENT (OUTPATIENT)
Dept: CALL CENTER | Facility: HOSPITAL | Age: 69
End: 2019-02-08

## 2019-02-08 LAB
ABO + RH BLD: NORMAL
ABO + RH BLD: NORMAL
BH BB BLOOD EXPIRATION DATE: NORMAL
BH BB BLOOD EXPIRATION DATE: NORMAL
BH BB BLOOD TYPE BARCODE: 6200
BH BB BLOOD TYPE BARCODE: 6200
BH BB DISPENSE STATUS: NORMAL
BH BB DISPENSE STATUS: NORMAL
BH BB PRODUCT CODE: NORMAL
BH BB PRODUCT CODE: NORMAL
BH BB UNIT NUMBER: NORMAL
BH BB UNIT NUMBER: NORMAL
UNIT  ABO: NORMAL
UNIT  ABO: NORMAL
UNIT  RH: NORMAL
UNIT  RH: NORMAL

## 2019-02-08 NOTE — OUTREACH NOTE
Total Joint Week 2 Survey      Responses   Facility patient discharged from?  [place]   Does the patient have one of the following disease processes/diagnoses(primary or secondary)?  Total Joint Replacement   Joint surgery performed?  Shoulder   Week 2 attempt successful?  Yes   Call start time  0815   Call end time  0823   Has the patient been back in either the hospital or Emergency Department since discharge?  Yes   Medication alerts for this patient  started on a new heart medication   Is the patient taking all medications as directed (includes completed medication regime)?  Yes   Has the patient kept scheduled appointments due by today?  Yes   Comments  has seen her surgery and xrays were done, looking good   Has the patient began therapy sessions (either in the home or as an out patient)?  Yes   Has the patient fallen since discharge?  No   Comments  just doing  therapy at home by the doctor, no outpatient therapy   What is the patient's perception of their functional status since discharge?  New symptoms unrelated to diagnosis   Additional teach back comments  patient was in the ED 2/7 with angioedema   Week 2 call completed?  Yes   Wrap up additional comments  patient is improving from her shoulder, had angioedema yesterday, treated in the Ed          Clementina Pacheco RN

## 2019-02-22 ENCOUNTER — READMISSION MANAGEMENT (OUTPATIENT)
Dept: CALL CENTER | Facility: HOSPITAL | Age: 69
End: 2019-02-22

## 2019-02-22 NOTE — OUTREACH NOTE
Total Joint Month 1 Survey      Responses   Facility patient discharged from?  [place]   Does the patient have one of the following disease processes/diagnoses(primary or secondary)?  Total Joint Replacement   Joint surgery performed?  Shoulder   Month 1 attempt successful?  No   Unsuccessful attempts  Attempt 1          Kierra Pickett RN

## 2019-02-25 ENCOUNTER — READMISSION MANAGEMENT (OUTPATIENT)
Dept: CALL CENTER | Facility: HOSPITAL | Age: 69
End: 2019-02-25

## 2019-02-25 NOTE — OUTREACH NOTE
Total Joint Month 1 Survey      Responses   Facility patient discharged from?  [place]   Does the patient have one of the following disease processes/diagnoses(primary or secondary)?  Total Joint Replacement   Joint surgery performed?  Shoulder   Month 1 attempt successful?  No   Unsuccessful attempts  Attempt 2          Kely Kingsley RN

## 2019-03-27 ENCOUNTER — READMISSION MANAGEMENT (OUTPATIENT)
Dept: CALL CENTER | Facility: HOSPITAL | Age: 69
End: 2019-03-27

## 2019-03-27 NOTE — OUTREACH NOTE
Total Joint Month 2 Survey      Responses   Facility patient discharged from?  [place]   Does the patient have one of the following disease processes/diagnoses(primary or secondary)?  Total Joint Replacement   Joint surgery performed?  Shoulder   Month 2 attempt successful?  No   Unsuccessful attempts  Attempt 1          Susan Ramos RN

## 2019-03-28 ENCOUNTER — READMISSION MANAGEMENT (OUTPATIENT)
Dept: CALL CENTER | Facility: HOSPITAL | Age: 69
End: 2019-03-28

## 2019-04-30 ENCOUNTER — READMISSION MANAGEMENT (OUTPATIENT)
Dept: CALL CENTER | Facility: HOSPITAL | Age: 69
End: 2019-04-30

## 2019-04-30 NOTE — OUTREACH NOTE
Total Joint Month 3 Survey      Responses   Facility patient discharged from?  [place]   Does the patient have one of the following disease processes/diagnoses(primary or secondary)?  Total Joint Replacement   Joint surgery performed?  Shoulder   Month 3 attempt successful?  Yes   Call start time  1310   Call end time  1314   Has the patient been back in either the hospital or Emergency Department since discharge?  No   Is the patient taking all medications as directed (includes completed medication regime)?  Yes   Has the patient kept scheduled appointments due by today?  Yes   Is the patient still receiving Home Health Services?  N/A   Is the patient still attending therapy sessions(either in the home or as an outpatient)?  Yes   Has the patient fallen since discharge?  No   What is the patient's perception of their functional status since discharge?  Improving   Graduated  Yes   Did the patient feel the follow up calls were helpful during their recovery period?  Yes   Was the number of calls appropriate?  Yes   Wrap up additional comments  Daughter states that she is doing well.          Kely Kingsley RN

## 2020-01-16 ENCOUNTER — TRANSCRIBE ORDERS (OUTPATIENT)
Dept: ADMINISTRATIVE | Facility: HOSPITAL | Age: 70
End: 2020-01-16

## 2020-01-16 DIAGNOSIS — M19.012 ARTHRITIS OF LEFT SHOULDER REGION: Primary | ICD-10-CM

## 2020-02-10 ENCOUNTER — HOSPITAL ENCOUNTER (OUTPATIENT)
Dept: GENERAL RADIOLOGY | Facility: HOSPITAL | Age: 70
Discharge: HOME | End: 2020-02-10

## 2020-02-10 ENCOUNTER — HOSPITAL ENCOUNTER (OUTPATIENT)
Dept: CT IMAGING | Facility: HOSPITAL | Age: 70
Discharge: HOME | End: 2020-02-10
Admitting: ORTHOPAEDIC SURGERY

## 2020-02-10 ENCOUNTER — APPOINTMENT (OUTPATIENT)
Dept: PREADMISSION TESTING | Facility: HOSPITAL | Age: 70
End: 2020-02-10

## 2020-02-10 VITALS — HEIGHT: 63 IN | BODY MASS INDEX: 35.01 KG/M2 | WEIGHT: 197.6 LBS

## 2020-02-10 DIAGNOSIS — M19.012 ARTHRITIS OF LEFT SHOULDER REGION: ICD-10-CM

## 2020-02-10 LAB
ANION GAP SERPL CALCULATED.3IONS-SCNC: 14 MMOL/L (ref 5–15)
BUN BLD-MCNC: 24 MG/DL (ref 8–23)
BUN/CREAT SERPL: 27.9 (ref 7–25)
CALCIUM SPEC-SCNC: 10.2 MG/DL (ref 8.6–10.5)
CHLORIDE SERPL-SCNC: 100 MMOL/L (ref 98–107)
CO2 SERPL-SCNC: 28 MMOL/L (ref 22–29)
CREAT BLD-MCNC: 0.86 MG/DL (ref 0.57–1)
DEPRECATED RDW RBC AUTO: 47.3 FL (ref 37–54)
ERYTHROCYTE [DISTWIDTH] IN BLOOD BY AUTOMATED COUNT: 13.2 % (ref 12.3–15.4)
GFR SERPL CREATININE-BSD FRML MDRD: 65 ML/MIN/1.73
GLUCOSE BLD-MCNC: 292 MG/DL (ref 65–99)
HBA1C MFR BLD: 7.1 % (ref 4.8–5.6)
HCT VFR BLD AUTO: 43.1 % (ref 34–46.6)
HGB BLD-MCNC: 14.3 G/DL (ref 12–15.9)
MCH RBC QN AUTO: 32.1 PG (ref 26.6–33)
MCHC RBC AUTO-ENTMCNC: 33.2 G/DL (ref 31.5–35.7)
MCV RBC AUTO: 96.6 FL (ref 79–97)
PLATELET # BLD AUTO: 268 10*3/MM3 (ref 140–450)
PMV BLD AUTO: 12 FL (ref 6–12)
POTASSIUM BLD-SCNC: 4.6 MMOL/L (ref 3.5–5.2)
RBC # BLD AUTO: 4.46 10*6/MM3 (ref 3.77–5.28)
SODIUM BLD-SCNC: 142 MMOL/L (ref 136–145)
WBC NRBC COR # BLD: 7.11 10*3/MM3 (ref 3.4–10.8)

## 2020-02-10 PROCEDURE — 80048 BASIC METABOLIC PNL TOTAL CA: CPT | Performed by: ORTHOPAEDIC SURGERY

## 2020-02-10 PROCEDURE — 73200 CT UPPER EXTREMITY W/O DYE: CPT

## 2020-02-10 PROCEDURE — 85027 COMPLETE CBC AUTOMATED: CPT | Performed by: ORTHOPAEDIC SURGERY

## 2020-02-10 PROCEDURE — 83036 HEMOGLOBIN GLYCOSYLATED A1C: CPT | Performed by: ORTHOPAEDIC SURGERY

## 2020-02-10 PROCEDURE — 36415 COLL VENOUS BLD VENIPUNCTURE: CPT

## 2020-02-10 PROCEDURE — 71046 X-RAY EXAM CHEST 2 VIEWS: CPT

## 2020-02-10 PROCEDURE — 93010 ELECTROCARDIOGRAM REPORT: CPT | Performed by: INTERNAL MEDICINE

## 2020-02-10 PROCEDURE — 93005 ELECTROCARDIOGRAM TRACING: CPT

## 2020-02-10 RX ORDER — MELOXICAM 7.5 MG/1
7.5 TABLET ORAL DAILY
COMMUNITY
End: 2020-02-27 | Stop reason: HOSPADM

## 2020-02-10 RX ORDER — SENNOSIDES 8.6 MG
650 CAPSULE ORAL EVERY 8 HOURS PRN
COMMUNITY

## 2020-02-10 RX ORDER — AMLODIPINE BESYLATE 10 MG/1
10 TABLET ORAL DAILY
COMMUNITY

## 2020-02-24 ENCOUNTER — ANESTHESIA EVENT (OUTPATIENT)
Dept: PERIOP | Facility: HOSPITAL | Age: 70
End: 2020-02-24

## 2020-02-24 ENCOUNTER — APPOINTMENT (OUTPATIENT)
Dept: GENERAL RADIOLOGY | Facility: HOSPITAL | Age: 70
End: 2020-02-24

## 2020-02-24 ENCOUNTER — ANESTHESIA (OUTPATIENT)
Dept: PERIOP | Facility: HOSPITAL | Age: 70
End: 2020-02-24

## 2020-02-24 ENCOUNTER — HOSPITAL ENCOUNTER (INPATIENT)
Facility: HOSPITAL | Age: 70
LOS: 3 days | Discharge: HOME | End: 2020-02-27
Attending: ORTHOPAEDIC SURGERY | Admitting: ORTHOPAEDIC SURGERY

## 2020-02-24 DIAGNOSIS — Z74.09 IMPAIRED MOBILITY AND ADLS: ICD-10-CM

## 2020-02-24 DIAGNOSIS — Z96.612 STATUS POST REPLACEMENT OF LEFT SHOULDER JOINT: Primary | ICD-10-CM

## 2020-02-24 DIAGNOSIS — Z78.9 IMPAIRED MOBILITY AND ADLS: ICD-10-CM

## 2020-02-24 PROBLEM — I42.9 CARDIOMYOPATHY (HCC): Status: ACTIVE | Noted: 2020-02-24

## 2020-02-24 LAB
GLUCOSE BLDC GLUCOMTR-MCNC: 186 MG/DL (ref 70–130)
GLUCOSE BLDC GLUCOMTR-MCNC: 214 MG/DL (ref 70–130)
GLUCOSE BLDC GLUCOMTR-MCNC: 320 MG/DL (ref 70–130)
POTASSIUM BLD-SCNC: 4.6 MMOL/L (ref 3.5–5.2)

## 2020-02-24 PROCEDURE — A9270 NON-COVERED ITEM OR SERVICE: HCPCS | Performed by: PHYSICIAN ASSISTANT

## 2020-02-24 PROCEDURE — 99222 1ST HOSP IP/OBS MODERATE 55: CPT | Performed by: PHYSICIAN ASSISTANT

## 2020-02-24 PROCEDURE — 63710000001 INSULIN LISPRO (HUMAN) PER 5 UNITS: Performed by: PHYSICIAN ASSISTANT

## 2020-02-24 PROCEDURE — 25010000002 VANCOMYCIN 1 G RECONSTITUTED SOLUTION: Performed by: ANESTHESIOLOGY

## 2020-02-24 PROCEDURE — 25010000002 FENTANYL CITRATE (PF) 100 MCG/2ML SOLUTION: Performed by: NURSE ANESTHETIST, CERTIFIED REGISTERED

## 2020-02-24 PROCEDURE — C1713 ANCHOR/SCREW BN/BN,TIS/BN: HCPCS | Performed by: ORTHOPAEDIC SURGERY

## 2020-02-24 PROCEDURE — C1776 JOINT DEVICE (IMPLANTABLE): HCPCS | Performed by: ORTHOPAEDIC SURGERY

## 2020-02-24 PROCEDURE — 63710000001 PHENOL 1.4 % LIQUID 177 ML BOTTLE: Performed by: PHYSICIAN ASSISTANT

## 2020-02-24 PROCEDURE — 25010000003 CEFAZOLIN IN DEXTROSE 2-4 GM/100ML-% SOLUTION: Performed by: ORTHOPAEDIC SURGERY

## 2020-02-24 PROCEDURE — 0LS40ZZ REPOSITION LEFT UPPER ARM TENDON, OPEN APPROACH: ICD-10-PCS | Performed by: ORTHOPAEDIC SURGERY

## 2020-02-24 PROCEDURE — 25010000002 DEXAMETHASONE PER 1 MG: Performed by: NURSE ANESTHETIST, CERTIFIED REGISTERED

## 2020-02-24 PROCEDURE — L3670 SO ACRO/CLAV CAN WEB PRE OTS: HCPCS | Performed by: ORTHOPAEDIC SURGERY

## 2020-02-24 PROCEDURE — 73030 X-RAY EXAM OF SHOULDER: CPT

## 2020-02-24 PROCEDURE — 25010000002 PROPOFOL 10 MG/ML EMULSION: Performed by: NURSE ANESTHETIST, CERTIFIED REGISTERED

## 2020-02-24 PROCEDURE — 63710000001 BUSPIRONE 10 MG TABLET: Performed by: PHYSICIAN ASSISTANT

## 2020-02-24 PROCEDURE — 84132 ASSAY OF SERUM POTASSIUM: CPT | Performed by: ANESTHESIOLOGY

## 2020-02-24 PROCEDURE — 25010000002 PHENYLEPHRINE PER 1 ML: Performed by: NURSE ANESTHETIST, CERTIFIED REGISTERED

## 2020-02-24 PROCEDURE — 25010000002 VANCOMYCIN 10 G RECONSTITUTED SOLUTION: Performed by: ORTHOPAEDIC SURGERY

## 2020-02-24 PROCEDURE — 25010000002 ROPIVACAINE PER 1 MG: Performed by: NURSE ANESTHETIST, CERTIFIED REGISTERED

## 2020-02-24 PROCEDURE — 0RRK00Z REPLACEMENT OF LEFT SHOULDER JOINT WITH REVERSE BALL AND SOCKET SYNTHETIC SUBSTITUTE, OPEN APPROACH: ICD-10-PCS | Performed by: ORTHOPAEDIC SURGERY

## 2020-02-24 PROCEDURE — 25010000002 NEOSTIGMINE 10 MG/10ML SOLUTION: Performed by: ANESTHESIOLOGY

## 2020-02-24 PROCEDURE — 82962 GLUCOSE BLOOD TEST: CPT

## 2020-02-24 PROCEDURE — 94799 UNLISTED PULMONARY SVC/PX: CPT

## 2020-02-24 PROCEDURE — 25010000002 ONDANSETRON PER 1 MG: Performed by: ANESTHESIOLOGY

## 2020-02-24 DEVICE — SCRW COMPR EQUINOXE LK 4.5X34MM: Type: IMPLANTABLE DEVICE | Site: SHOULDER | Status: FUNCTIONAL

## 2020-02-24 DEVICE — SCRW COMPR EQUINOXE LK 4.5X30MM: Type: IMPLANTABLE DEVICE | Site: SHOULDER | Status: FUNCTIONAL

## 2020-02-24 DEVICE — SCRW EQUINOXE TORQ DEFINE REV SHLDR KT: Type: IMPLANTABLE DEVICE | Site: SHOULDER | Status: FUNCTIONAL

## 2020-02-24 DEVICE — STEM HUM PRI EQUINOXE PRESSFIT 9MM SHT: Type: IMPLANTABLE DEVICE | Site: SHOULDER | Status: FUNCTIONAL

## 2020-02-24 DEVICE — SCRW LK EQUINOXE GLENOSPHERE REV/SHLDR: Type: IMPLANTABLE DEVICE | Site: SHOULDER | Status: FUNCTIONAL

## 2020-02-24 DEVICE — TRY HUM EQUINOXE ADPT REV SHLDR PLS0: Type: IMPLANTABLE DEVICE | Site: SHOULDER | Status: FUNCTIONAL

## 2020-02-24 DEVICE — GLENOSPHERE SHLDR/REV EQUINOXE 38MM: Type: IMPLANTABLE DEVICE | Site: SHOULDER | Status: FUNCTIONAL

## 2020-02-24 DEVICE — IMPLANTABLE DEVICE: Type: IMPLANTABLE DEVICE | Site: SHOULDER | Status: FUNCTIONAL

## 2020-02-24 DEVICE — TOTL SHLDR AUG PLT ARTHROPLASTY UPCHRG: Type: IMPLANTABLE DEVICE | Site: SHOULDER | Status: FUNCTIONAL

## 2020-02-24 DEVICE — PLT GLEN JNT EQUINOXE AUG POST 8DEG LT: Type: IMPLANTABLE DEVICE | Site: SHOULDER | Status: FUNCTIONAL

## 2020-02-24 DEVICE — LINER HUM EQUINOXE REV SHLDR 38 PLS0: Type: IMPLANTABLE DEVICE | Site: SHOULDER | Status: FUNCTIONAL

## 2020-02-24 RX ORDER — ACETAMINOPHEN 650 MG/1
650 SUPPOSITORY RECTAL EVERY 4 HOURS PRN
Status: DISCONTINUED | OUTPATIENT
Start: 2020-02-24 | End: 2020-02-27 | Stop reason: HOSPADM

## 2020-02-24 RX ORDER — ONDANSETRON 2 MG/ML
INJECTION INTRAMUSCULAR; INTRAVENOUS AS NEEDED
Status: DISCONTINUED | OUTPATIENT
Start: 2020-02-24 | End: 2020-02-24 | Stop reason: SURG

## 2020-02-24 RX ORDER — NEOSTIGMINE METHYLSULFATE 1 MG/ML
INJECTION, SOLUTION INTRAVENOUS AS NEEDED
Status: DISCONTINUED | OUTPATIENT
Start: 2020-02-24 | End: 2020-02-24 | Stop reason: SURG

## 2020-02-24 RX ORDER — PRAVASTATIN SODIUM 20 MG
80 TABLET ORAL DAILY
Status: DISCONTINUED | OUTPATIENT
Start: 2020-02-25 | End: 2020-02-27 | Stop reason: HOSPADM

## 2020-02-24 RX ORDER — LIDOCAINE HYDROCHLORIDE 10 MG/ML
0.5 INJECTION, SOLUTION EPIDURAL; INFILTRATION; INTRACAUDAL; PERINEURAL ONCE AS NEEDED
Status: COMPLETED | OUTPATIENT
Start: 2020-02-24 | End: 2020-02-24

## 2020-02-24 RX ORDER — DIGOXIN 125 MCG
125 TABLET ORAL
Status: DISCONTINUED | OUTPATIENT
Start: 2020-02-25 | End: 2020-02-27 | Stop reason: HOSPADM

## 2020-02-24 RX ORDER — SODIUM CHLORIDE 0.9 % (FLUSH) 0.9 %
10 SYRINGE (ML) INJECTION EVERY 12 HOURS SCHEDULED
Status: DISCONTINUED | OUTPATIENT
Start: 2020-02-24 | End: 2020-02-24 | Stop reason: HOSPADM

## 2020-02-24 RX ORDER — FUROSEMIDE 40 MG/1
40 TABLET ORAL DAILY
Status: DISCONTINUED | OUTPATIENT
Start: 2020-02-25 | End: 2020-02-27 | Stop reason: HOSPADM

## 2020-02-24 RX ORDER — LIDOCAINE HYDROCHLORIDE 10 MG/ML
INJECTION, SOLUTION EPIDURAL; INFILTRATION; INTRACAUDAL; PERINEURAL AS NEEDED
Status: DISCONTINUED | OUTPATIENT
Start: 2020-02-24 | End: 2020-02-24 | Stop reason: SURG

## 2020-02-24 RX ORDER — DEXAMETHASONE SODIUM PHOSPHATE 10 MG/ML
INJECTION INTRAMUSCULAR; INTRAVENOUS AS NEEDED
Status: DISCONTINUED | OUTPATIENT
Start: 2020-02-24 | End: 2020-02-24 | Stop reason: SURG

## 2020-02-24 RX ORDER — SODIUM CHLORIDE 0.9 % (FLUSH) 0.9 %
10 SYRINGE (ML) INJECTION AS NEEDED
Status: DISCONTINUED | OUTPATIENT
Start: 2020-02-24 | End: 2020-02-24 | Stop reason: HOSPADM

## 2020-02-24 RX ORDER — ROCURONIUM BROMIDE 10 MG/ML
INJECTION, SOLUTION INTRAVENOUS AS NEEDED
Status: DISCONTINUED | OUTPATIENT
Start: 2020-02-24 | End: 2020-02-24 | Stop reason: SURG

## 2020-02-24 RX ORDER — ACETAMINOPHEN 325 MG/1
650 TABLET ORAL EVERY 4 HOURS PRN
Status: DISCONTINUED | OUTPATIENT
Start: 2020-02-24 | End: 2020-02-27 | Stop reason: HOSPADM

## 2020-02-24 RX ORDER — SODIUM CHLORIDE 450 MG/100ML
50 INJECTION, SOLUTION INTRAVENOUS CONTINUOUS
Status: DISCONTINUED | OUTPATIENT
Start: 2020-02-24 | End: 2020-02-27 | Stop reason: HOSPADM

## 2020-02-24 RX ORDER — AMLODIPINE BESYLATE 10 MG/1
10 TABLET ORAL DAILY
Status: DISCONTINUED | OUTPATIENT
Start: 2020-02-25 | End: 2020-02-27 | Stop reason: HOSPADM

## 2020-02-24 RX ORDER — GLYCOPYRROLATE 0.2 MG/ML
INJECTION INTRAMUSCULAR; INTRAVENOUS AS NEEDED
Status: DISCONTINUED | OUTPATIENT
Start: 2020-02-24 | End: 2020-02-24 | Stop reason: SURG

## 2020-02-24 RX ORDER — FAMOTIDINE 10 MG/ML
20 INJECTION, SOLUTION INTRAVENOUS ONCE
Status: CANCELLED | OUTPATIENT
Start: 2020-02-24 | End: 2020-02-24

## 2020-02-24 RX ORDER — BISACODYL 5 MG/1
10 TABLET, DELAYED RELEASE ORAL DAILY PRN
Status: DISCONTINUED | OUTPATIENT
Start: 2020-02-24 | End: 2020-02-27 | Stop reason: HOSPADM

## 2020-02-24 RX ORDER — FENTANYL CITRATE 50 UG/ML
50 INJECTION, SOLUTION INTRAMUSCULAR; INTRAVENOUS
Status: DISCONTINUED | OUTPATIENT
Start: 2020-02-24 | End: 2020-02-24 | Stop reason: HOSPADM

## 2020-02-24 RX ORDER — ONDANSETRON 2 MG/ML
4 INJECTION INTRAMUSCULAR; INTRAVENOUS ONCE AS NEEDED
Status: DISCONTINUED | OUTPATIENT
Start: 2020-02-24 | End: 2020-02-24 | Stop reason: HOSPADM

## 2020-02-24 RX ORDER — BUPIVACAINE HYDROCHLORIDE 2.5 MG/ML
INJECTION, SOLUTION EPIDURAL; INFILTRATION; INTRACAUDAL
Status: COMPLETED | OUTPATIENT
Start: 2020-02-24 | End: 2020-02-24

## 2020-02-24 RX ORDER — VANCOMYCIN HYDROCHLORIDE 1 G/20ML
INJECTION, POWDER, LYOPHILIZED, FOR SOLUTION INTRAVENOUS AS NEEDED
Status: DISCONTINUED | OUTPATIENT
Start: 2020-02-24 | End: 2020-02-24 | Stop reason: SURG

## 2020-02-24 RX ORDER — IPRATROPIUM BROMIDE AND ALBUTEROL SULFATE 2.5; .5 MG/3ML; MG/3ML
3 SOLUTION RESPIRATORY (INHALATION) EVERY 4 HOURS PRN
Status: DISCONTINUED | OUTPATIENT
Start: 2020-02-24 | End: 2020-02-27 | Stop reason: HOSPADM

## 2020-02-24 RX ORDER — PROPOFOL 10 MG/ML
VIAL (ML) INTRAVENOUS AS NEEDED
Status: DISCONTINUED | OUTPATIENT
Start: 2020-02-24 | End: 2020-02-24 | Stop reason: SURG

## 2020-02-24 RX ORDER — MAGNESIUM HYDROXIDE 1200 MG/15ML
LIQUID ORAL AS NEEDED
Status: DISCONTINUED | OUTPATIENT
Start: 2020-02-24 | End: 2020-02-24 | Stop reason: HOSPADM

## 2020-02-24 RX ORDER — FENTANYL CITRATE 50 UG/ML
INJECTION, SOLUTION INTRAMUSCULAR; INTRAVENOUS
Status: COMPLETED | OUTPATIENT
Start: 2020-02-24 | End: 2020-02-24

## 2020-02-24 RX ORDER — CEFAZOLIN SODIUM 2 G/100ML
2 INJECTION, SOLUTION INTRAVENOUS ONCE
Status: COMPLETED | OUTPATIENT
Start: 2020-02-24 | End: 2020-02-24

## 2020-02-24 RX ORDER — ACETAMINOPHEN 500 MG
1000 TABLET ORAL ONCE
Status: COMPLETED | OUTPATIENT
Start: 2020-02-24 | End: 2020-02-24

## 2020-02-24 RX ORDER — DOCUSATE SODIUM 100 MG/1
100 CAPSULE, LIQUID FILLED ORAL 2 TIMES DAILY PRN
Status: DISCONTINUED | OUTPATIENT
Start: 2020-02-24 | End: 2020-02-27 | Stop reason: HOSPADM

## 2020-02-24 RX ORDER — VENLAFAXINE HYDROCHLORIDE 75 MG/1
150 CAPSULE, EXTENDED RELEASE ORAL DAILY
Status: DISCONTINUED | OUTPATIENT
Start: 2020-02-25 | End: 2020-02-27 | Stop reason: HOSPADM

## 2020-02-24 RX ORDER — HYDROMORPHONE HYDROCHLORIDE 1 MG/ML
0.5 INJECTION, SOLUTION INTRAMUSCULAR; INTRAVENOUS; SUBCUTANEOUS
Status: DISCONTINUED | OUTPATIENT
Start: 2020-02-24 | End: 2020-02-27 | Stop reason: HOSPADM

## 2020-02-24 RX ORDER — FAMOTIDINE 20 MG/1
20 TABLET, FILM COATED ORAL ONCE
Status: COMPLETED | OUTPATIENT
Start: 2020-02-24 | End: 2020-02-24

## 2020-02-24 RX ORDER — NALOXONE HCL 0.4 MG/ML
0.1 VIAL (ML) INJECTION
Status: DISCONTINUED | OUTPATIENT
Start: 2020-02-24 | End: 2020-02-27 | Stop reason: HOSPADM

## 2020-02-24 RX ORDER — DEXTROSE MONOHYDRATE 25 G/50ML
25 INJECTION, SOLUTION INTRAVENOUS
Status: DISCONTINUED | OUTPATIENT
Start: 2020-02-24 | End: 2020-02-27 | Stop reason: HOSPADM

## 2020-02-24 RX ORDER — SODIUM CHLORIDE, SODIUM LACTATE, POTASSIUM CHLORIDE, CALCIUM CHLORIDE 600; 310; 30; 20 MG/100ML; MG/100ML; MG/100ML; MG/100ML
9 INJECTION, SOLUTION INTRAVENOUS CONTINUOUS
Status: DISCONTINUED | OUTPATIENT
Start: 2020-02-24 | End: 2020-02-27 | Stop reason: HOSPADM

## 2020-02-24 RX ORDER — OXYCODONE HYDROCHLORIDE 5 MG/1
5 TABLET ORAL EVERY 4 HOURS PRN
Status: DISCONTINUED | OUTPATIENT
Start: 2020-02-24 | End: 2020-02-27 | Stop reason: HOSPADM

## 2020-02-24 RX ORDER — CARVEDILOL 12.5 MG/1
25 TABLET ORAL DAILY
Status: DISCONTINUED | OUTPATIENT
Start: 2020-02-25 | End: 2020-02-27

## 2020-02-24 RX ORDER — FAMOTIDINE 20 MG/1
20 TABLET, FILM COATED ORAL DAILY
Status: DISCONTINUED | OUTPATIENT
Start: 2020-02-25 | End: 2020-02-27 | Stop reason: HOSPADM

## 2020-02-24 RX ORDER — NICOTINE POLACRILEX 4 MG
15 LOZENGE BUCCAL
Status: DISCONTINUED | OUTPATIENT
Start: 2020-02-24 | End: 2020-02-27 | Stop reason: HOSPADM

## 2020-02-24 RX ORDER — BUSPIRONE HYDROCHLORIDE 10 MG/1
10 TABLET ORAL 3 TIMES DAILY
Status: DISCONTINUED | OUTPATIENT
Start: 2020-02-24 | End: 2020-02-27 | Stop reason: HOSPADM

## 2020-02-24 RX ADMIN — BUPIVACAINE HYDROCHLORIDE 5 ML: 2.5 INJECTION, SOLUTION EPIDURAL; INFILTRATION; INTRACAUDAL; PERINEURAL at 15:16

## 2020-02-24 RX ADMIN — ACETAMINOPHEN 1000 MG: 500 TABLET ORAL at 12:53

## 2020-02-24 RX ADMIN — NEOSTIGMINE METHYLSULFATE 2 MG: 1 INJECTION, SOLUTION INTRAVENOUS at 16:43

## 2020-02-24 RX ADMIN — LIDOCAINE HYDROCHLORIDE 50 MG: 10 INJECTION, SOLUTION EPIDURAL; INFILTRATION; INTRACAUDAL; PERINEURAL at 15:13

## 2020-02-24 RX ADMIN — PHENOL 1 SPRAY: 1.5 LIQUID ORAL at 22:59

## 2020-02-24 RX ADMIN — PHENYLEPHRINE HYDROCHLORIDE 0.01 MCG/KG/MIN: 10 INJECTION INTRAVENOUS at 15:29

## 2020-02-24 RX ADMIN — PHENYLEPHRINE HYDROCHLORIDE 100 MCG: 10 INJECTION INTRAVENOUS at 15:20

## 2020-02-24 RX ADMIN — BUPIVACAINE HYDROCHLORIDE 10 ML: 2.5 INJECTION, SOLUTION EPIDURAL; INFILTRATION; INTRACAUDAL; PERINEURAL at 13:15

## 2020-02-24 RX ADMIN — VANCOMYCIN HYDROCHLORIDE 1250 MG: 10 INJECTION, POWDER, LYOPHILIZED, FOR SOLUTION INTRAVENOUS at 14:52

## 2020-02-24 RX ADMIN — ROCURONIUM BROMIDE 30 MG: 10 INJECTION INTRAVENOUS at 15:13

## 2020-02-24 RX ADMIN — SODIUM CHLORIDE, POTASSIUM CHLORIDE, SODIUM LACTATE AND CALCIUM CHLORIDE 9 ML/HR: 600; 310; 30; 20 INJECTION, SOLUTION INTRAVENOUS at 12:53

## 2020-02-24 RX ADMIN — FAMOTIDINE 20 MG: 20 TABLET ORAL at 12:53

## 2020-02-24 RX ADMIN — CEFAZOLIN SODIUM 2 G: 2 INJECTION, SOLUTION INTRAVENOUS at 14:19

## 2020-02-24 RX ADMIN — GLYCOPYRROLATE 0.4 MG: 0.2 INJECTION INTRAMUSCULAR; INTRAVENOUS at 16:43

## 2020-02-24 RX ADMIN — LIDOCAINE HYDROCHLORIDE 0.5 ML: 10 INJECTION, SOLUTION EPIDURAL; INFILTRATION; INTRACAUDAL; PERINEURAL at 12:53

## 2020-02-24 RX ADMIN — VANCOMYCIN HYDROCHLORIDE 1.25 G: 1 INJECTION, POWDER, LYOPHILIZED, FOR SOLUTION INTRAVENOUS at 15:20

## 2020-02-24 RX ADMIN — DEXAMETHASONE SODIUM PHOSPHATE 8 MG: 10 INJECTION INTRAMUSCULAR; INTRAVENOUS at 15:30

## 2020-02-24 RX ADMIN — ONDANSETRON 4 MG: 2 INJECTION INTRAMUSCULAR; INTRAVENOUS at 16:38

## 2020-02-24 RX ADMIN — ROPIVACAINE HYDROCHLORIDE 6 ML/HR: 5 INJECTION, SOLUTION EPIDURAL; INFILTRATION; PERINEURAL at 17:20

## 2020-02-24 RX ADMIN — FENTANYL CITRATE 100 MCG: 50 INJECTION, SOLUTION INTRAMUSCULAR; INTRAVENOUS at 13:15

## 2020-02-24 RX ADMIN — BUSPIRONE HYDROCHLORIDE 10 MG: 10 TABLET ORAL at 22:20

## 2020-02-24 RX ADMIN — INSULIN LISPRO 5 UNITS: 100 INJECTION, SOLUTION INTRAVENOUS; SUBCUTANEOUS at 23:13

## 2020-02-24 RX ADMIN — SODIUM CHLORIDE 50 ML/HR: 4.5 INJECTION, SOLUTION INTRAVENOUS at 22:20

## 2020-02-24 RX ADMIN — PHENYLEPHRINE HYDROCHLORIDE 200 MCG: 10 INJECTION INTRAVENOUS at 15:25

## 2020-02-24 RX ADMIN — PROPOFOL 150 MG: 10 INJECTION, EMULSION INTRAVENOUS at 15:13

## 2020-02-24 NOTE — ANESTHESIA POSTPROCEDURE EVALUATION
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          Post Anesthesia Care and Evaluation    Patient location during evaluation: PACU  Patient participation: complete - patient participated  Level of consciousness: awake and alert  Pain score: 0  Pain management: adequate  Airway patency: patent  Anesthetic complications: No anesthetic complications  PONV Status: none  Cardiovascular status: hemodynamically stable and acceptable  Respiratory status: nonlabored ventilation, acceptable and nasal cannula  Hydration status: acceptable

## 2020-02-24 NOTE — ANESTHESIA PROCEDURE NOTES
Peripheral Block      Patient reassessed immediately prior to procedure    Patient location during procedure: pre-op  Reason for block: at surgeon's request and post-op pain management  Performed by  Anesthesiologist: Sanford Rivero MD  Assisted by: Sigrid Ricardo RN  Preanesthetic Checklist  Completed: patient identified, site marked, surgical consent, pre-op evaluation, timeout performed, IV checked, risks and benefits discussed and monitors and equipment checked  Prep:  Sterile barriers:cap, gloves, mask and sterile barriers  Prep: ChloraPrep  Patient monitoring: blood pressure monitoring, continuous pulse oximetry and EKG  Procedure  Sedation:yes  Performed under: local infiltration  Guidance:ultrasound guided  Images:still images obtained, printed/placed on chart    Laterality:left  Block Type:interscalene  Injection Technique:catheter  Needle Type:Tuohy and echogenic  Needle Gauge:18 G  Resistance on Injection: none  Catheter Size:20 G (20g)  Cath Depth at skin: 7 cm    Medications Used: fentaNYL citrate (PF) (SUBLIMAZE) injection, 100 mcg  bupivacaine PF (MARCAINE) 0.25 % injection, 10 mL  Med admintered at 2/24/2020 1:15 PM      Post Assessment  Injection Assessment: negative aspiration for heme, no paresthesia on injection and incremental injection  Patient Tolerance:comfortable throughout block  Complications:no  Additional Notes  Procedure:                 The pt was placed in semifowlers position with a slight tilt of the thorax contralateral to the insertion site.  The Insertion Site was prepped and draped in sterile fashion.  The pt was anesthetized with  IV Sedation( see meds) and  Skin and cutaneous tissue was infiltrated and anesthetized with 1% Lidocaine 3 mls via a 25g needle.  Utilizing ultrasound guidance, a BBraun 2 inch 18 g Contiplex echogenic touhy needle was advanced in-plane.  Hydro dissection of tissue was achieved with Normal saline. Major vessels(carotid and Internal Jugular)  where visualized as the brachial plexus was approached at the approximate level of C-7/ T-1.  Cervical 5 and Branches of Cervical 6 nerve roots where visualized and the needle tip was placed posterior at the level of C-6 roots.  LA spread was visualized and injection was made incrementally every 5 mls with aspiration. Injection pressure was normal or little, there was no intraneural injection, no vascular injection.      The BBraun 20 g wire stylet  catheter was then placed under US guidance on the posterior aspect of the Brachial Plexus.  Location of catheter was confirmed with NS injection visualized with US . The tuohy was then removed and the skin was sealed with Skin AFix at catheter insertion site.  Skin was prepped with mastisol and the labeled catheter  was secured with steristrips and a CHG tegaderm. Thank You.

## 2020-02-24 NOTE — ANESTHESIA PREPROCEDURE EVALUATION
Anesthesia Evaluation     Patient summary reviewed and Nursing notes reviewed   no history of anesthetic complications:  NPO Solid Status: > 8 hours  NPO Liquid Status: > 8 hours           Airway   Mallampati: II  TM distance: >3 FB  Neck ROM: full  No difficulty expected  Dental      Pulmonary - negative pulmonary ROS and normal exam   Cardiovascular - normal exam    (+) hypertension, CHF , hyperlipidemia,       Neuro/Psych- negative ROS  GI/Hepatic/Renal/Endo    (+)  GERD,  renal disease, diabetes mellitus,     Musculoskeletal     Abdominal    Substance History      OB/GYN          Other   arthritis,                      Anesthesia Plan    ASA 3     general   (Block)  intravenous induction     Anesthetic plan, all risks, benefits, and alternatives have been provided, discussed and informed consent has been obtained with: patient.    Plan discussed with CRNA.

## 2020-02-24 NOTE — ANESTHESIA PROCEDURE NOTES
Airway  Urgency: elective    Date/Time: 2/24/2020 3:15 PM  Airway not difficult    General Information and Staff    Patient location during procedure: OR  CRNA: Ling Corral CRNA    Indications and Patient Condition  Indications for airway management: airway protection    Preoxygenated: yes  MILS not maintained throughout  Mask difficulty assessment: 1 - vent by mask    Final Airway Details  Final airway type: endotracheal airway      Successful airway: ETT  Cuffed: yes   Successful intubation technique: direct laryngoscopy  Endotracheal tube insertion site: oral  Blade: Nissa  Blade size: 3  ETT size (mm): 7.0  Cormack-Lehane Classification: grade I - full view of glottis  Placement verified by: chest auscultation and capnometry   Measured from: lips  ETT/EBT  to lips (cm): 20  Number of attempts at approach: 1  Assessment: lips, teeth, and gum same as pre-op and atraumatic intubation    Additional Comments  Negative epigastric sounds, Breath sound equal bilaterally with symmetric chest rise and fall

## 2020-02-25 LAB
BASOPHILS # BLD AUTO: 0.01 10*3/MM3 (ref 0–0.2)
BASOPHILS NFR BLD AUTO: 0.1 % (ref 0–1.5)
DEPRECATED RDW RBC AUTO: 47 FL (ref 37–54)
EOSINOPHIL # BLD AUTO: 0 10*3/MM3 (ref 0–0.4)
EOSINOPHIL NFR BLD AUTO: 0 % (ref 0.3–6.2)
ERYTHROCYTE [DISTWIDTH] IN BLOOD BY AUTOMATED COUNT: 13.2 % (ref 12.3–15.4)
GLUCOSE BLDC GLUCOMTR-MCNC: 197 MG/DL (ref 70–130)
GLUCOSE BLDC GLUCOMTR-MCNC: 218 MG/DL (ref 70–130)
GLUCOSE BLDC GLUCOMTR-MCNC: 247 MG/DL (ref 70–130)
GLUCOSE BLDC GLUCOMTR-MCNC: 251 MG/DL (ref 70–130)
HCT VFR BLD AUTO: 38 % (ref 34–46.6)
HGB BLD-MCNC: 12.7 G/DL (ref 12–15.9)
IMM GRANULOCYTES # BLD AUTO: 0.06 10*3/MM3 (ref 0–0.05)
IMM GRANULOCYTES NFR BLD AUTO: 0.6 % (ref 0–0.5)
LYMPHOCYTES # BLD AUTO: 1.66 10*3/MM3 (ref 0.7–3.1)
LYMPHOCYTES NFR BLD AUTO: 15.9 % (ref 19.6–45.3)
MCH RBC QN AUTO: 32.2 PG (ref 26.6–33)
MCHC RBC AUTO-ENTMCNC: 33.4 G/DL (ref 31.5–35.7)
MCV RBC AUTO: 96.4 FL (ref 79–97)
MONOCYTES # BLD AUTO: 1.19 10*3/MM3 (ref 0.1–0.9)
MONOCYTES NFR BLD AUTO: 11.4 % (ref 5–12)
NEUTROPHILS # BLD AUTO: 7.52 10*3/MM3 (ref 1.7–7)
NEUTROPHILS NFR BLD AUTO: 72 % (ref 42.7–76)
NRBC BLD AUTO-RTO: 0 /100 WBC (ref 0–0.2)
NT-PROBNP SERPL-MCNC: 81 PG/ML (ref 5–900)
PLATELET # BLD AUTO: 252 10*3/MM3 (ref 140–450)
PMV BLD AUTO: 12.3 FL (ref 6–12)
RBC # BLD AUTO: 3.94 10*6/MM3 (ref 3.77–5.28)
TSH SERPL DL<=0.05 MIU/L-ACNC: 3.89 UIU/ML (ref 0.27–4.2)
WBC NRBC COR # BLD: 10.44 10*3/MM3 (ref 3.4–10.8)

## 2020-02-25 PROCEDURE — 25010000002 VANCOMYCIN 10 G RECONSTITUTED SOLUTION: Performed by: ORTHOPAEDIC SURGERY

## 2020-02-25 PROCEDURE — 63710000001 PRAVASTATIN 20 MG TABLET: Performed by: PHYSICIAN ASSISTANT

## 2020-02-25 PROCEDURE — 63710000001 INSULIN LISPRO (HUMAN) PER 5 UNITS: Performed by: PHYSICIAN ASSISTANT

## 2020-02-25 PROCEDURE — 82962 GLUCOSE BLOOD TEST: CPT

## 2020-02-25 PROCEDURE — A9270 NON-COVERED ITEM OR SERVICE: HCPCS | Performed by: PHYSICIAN ASSISTANT

## 2020-02-25 PROCEDURE — 63710000001 LEVOTHYROXINE 150 MCG TABLET: Performed by: PHYSICIAN ASSISTANT

## 2020-02-25 PROCEDURE — 63710000001 OXYCODONE 5 MG TABLET: Performed by: ORTHOPAEDIC SURGERY

## 2020-02-25 PROCEDURE — 83880 ASSAY OF NATRIURETIC PEPTIDE: CPT | Performed by: PHYSICIAN ASSISTANT

## 2020-02-25 PROCEDURE — 63710000001 VENLAFAXINE XR 75 MG CAPSULE SUSTAINED-RELEASE 24 HR: Performed by: PHYSICIAN ASSISTANT

## 2020-02-25 PROCEDURE — 25010000002 HYDRALAZINE PER 20 MG: Performed by: PHYSICIAN ASSISTANT

## 2020-02-25 PROCEDURE — 63710000001 BUSPIRONE 10 MG TABLET: Performed by: PHYSICIAN ASSISTANT

## 2020-02-25 PROCEDURE — 97530 THERAPEUTIC ACTIVITIES: CPT

## 2020-02-25 PROCEDURE — 63710000001 FUROSEMIDE 40 MG TABLET: Performed by: PHYSICIAN ASSISTANT

## 2020-02-25 PROCEDURE — 97116 GAIT TRAINING THERAPY: CPT

## 2020-02-25 PROCEDURE — 94640 AIRWAY INHALATION TREATMENT: CPT

## 2020-02-25 PROCEDURE — A9270 NON-COVERED ITEM OR SERVICE: HCPCS | Performed by: ORTHOPAEDIC SURGERY

## 2020-02-25 PROCEDURE — 63710000001 ACETAMINOPHEN 325 MG TABLET: Performed by: ORTHOPAEDIC SURGERY

## 2020-02-25 PROCEDURE — 63710000001 CARVEDILOL 12.5 MG TABLET: Performed by: PHYSICIAN ASSISTANT

## 2020-02-25 PROCEDURE — 85007 BL SMEAR W/DIFF WBC COUNT: CPT | Performed by: ORTHOPAEDIC SURGERY

## 2020-02-25 PROCEDURE — 85025 COMPLETE CBC W/AUTO DIFF WBC: CPT | Performed by: ORTHOPAEDIC SURGERY

## 2020-02-25 PROCEDURE — 63710000001 LEVOTHYROXINE 25 MCG TABLET: Performed by: PHYSICIAN ASSISTANT

## 2020-02-25 PROCEDURE — 63710000001 DIGOXIN 125 MCG TABLET: Performed by: PHYSICIAN ASSISTANT

## 2020-02-25 PROCEDURE — 25010000002 HYDRALAZINE PER 20 MG

## 2020-02-25 PROCEDURE — 97166 OT EVAL MOD COMPLEX 45 MIN: CPT

## 2020-02-25 PROCEDURE — 94799 UNLISTED PULMONARY SVC/PX: CPT

## 2020-02-25 PROCEDURE — 84443 ASSAY THYROID STIM HORMONE: CPT | Performed by: PHYSICIAN ASSISTANT

## 2020-02-25 PROCEDURE — 63710000001 AMLODIPINE 10 MG TABLET: Performed by: PHYSICIAN ASSISTANT

## 2020-02-25 PROCEDURE — 63710000001 FAMOTIDINE 20 MG TABLET: Performed by: PHYSICIAN ASSISTANT

## 2020-02-25 PROCEDURE — 97161 PT EVAL LOW COMPLEX 20 MIN: CPT

## 2020-02-25 PROCEDURE — 99232 SBSQ HOSP IP/OBS MODERATE 35: CPT | Performed by: INTERNAL MEDICINE

## 2020-02-25 RX ORDER — HYDRALAZINE HYDROCHLORIDE 20 MG/ML
10 INJECTION INTRAMUSCULAR; INTRAVENOUS ONCE
Status: COMPLETED | OUTPATIENT
Start: 2020-02-25 | End: 2020-02-25

## 2020-02-25 RX ORDER — HYDRALAZINE HYDROCHLORIDE 20 MG/ML
INJECTION INTRAMUSCULAR; INTRAVENOUS
Status: COMPLETED
Start: 2020-02-25 | End: 2020-02-25

## 2020-02-25 RX ORDER — OXYCODONE HYDROCHLORIDE 5 MG/1
5 TABLET ORAL EVERY 4 HOURS PRN
Qty: 25 TABLET | Refills: 0 | Status: SHIPPED | OUTPATIENT
Start: 2020-02-25 | End: 2020-03-05

## 2020-02-25 RX ADMIN — AMLODIPINE BESYLATE 10 MG: 10 TABLET ORAL at 08:37

## 2020-02-25 RX ADMIN — INSULIN LISPRO 3 UNITS: 100 INJECTION, SOLUTION INTRAVENOUS; SUBCUTANEOUS at 21:17

## 2020-02-25 RX ADMIN — OXYCODONE HYDROCHLORIDE 5 MG: 5 TABLET ORAL at 12:53

## 2020-02-25 RX ADMIN — OXYCODONE HYDROCHLORIDE 5 MG: 5 TABLET ORAL at 21:21

## 2020-02-25 RX ADMIN — FAMOTIDINE 20 MG: 20 TABLET ORAL at 08:38

## 2020-02-25 RX ADMIN — OXYCODONE HYDROCHLORIDE 5 MG: 5 TABLET ORAL at 17:04

## 2020-02-25 RX ADMIN — ACETAMINOPHEN 650 MG: 325 TABLET, FILM COATED ORAL at 11:44

## 2020-02-25 RX ADMIN — HYDRALAZINE HYDROCHLORIDE 10 MG: 20 INJECTION INTRAMUSCULAR; INTRAVENOUS at 04:12

## 2020-02-25 RX ADMIN — OXYCODONE HYDROCHLORIDE 5 MG: 5 TABLET ORAL at 09:14

## 2020-02-25 RX ADMIN — OXYCODONE HYDROCHLORIDE 5 MG: 5 TABLET ORAL at 02:02

## 2020-02-25 RX ADMIN — DIGOXIN 125 MCG: 125 TABLET ORAL at 11:44

## 2020-02-25 RX ADMIN — LEVOTHYROXINE SODIUM 175 MCG: 150 TABLET ORAL at 06:30

## 2020-02-25 RX ADMIN — INSULIN LISPRO 2 UNITS: 100 INJECTION, SOLUTION INTRAVENOUS; SUBCUTANEOUS at 11:43

## 2020-02-25 RX ADMIN — VENLAFAXINE HYDROCHLORIDE 150 MG: 75 CAPSULE, EXTENDED RELEASE ORAL at 08:38

## 2020-02-25 RX ADMIN — CARVEDILOL 25 MG: 12.5 TABLET, FILM COATED ORAL at 08:38

## 2020-02-25 RX ADMIN — VANCOMYCIN HYDROCHLORIDE 1250 MG: 10 INJECTION, POWDER, LYOPHILIZED, FOR SOLUTION INTRAVENOUS at 02:02

## 2020-02-25 RX ADMIN — IPRATROPIUM BROMIDE AND ALBUTEROL SULFATE 3 ML: 2.5; .5 SOLUTION RESPIRATORY (INHALATION) at 15:37

## 2020-02-25 RX ADMIN — INSULIN LISPRO 4 UNITS: 100 INJECTION, SOLUTION INTRAVENOUS; SUBCUTANEOUS at 08:37

## 2020-02-25 RX ADMIN — PRAVASTATIN SODIUM 80 MG: 20 TABLET ORAL at 08:38

## 2020-02-25 RX ADMIN — HYDRALAZINE HYDROCHLORIDE: 20 INJECTION INTRAMUSCULAR; INTRAVENOUS at 04:18

## 2020-02-25 RX ADMIN — FUROSEMIDE 40 MG: 40 TABLET ORAL at 08:38

## 2020-02-25 RX ADMIN — BUSPIRONE HYDROCHLORIDE 10 MG: 10 TABLET ORAL at 15:40

## 2020-02-25 RX ADMIN — BUSPIRONE HYDROCHLORIDE 10 MG: 10 TABLET ORAL at 21:18

## 2020-02-25 RX ADMIN — INSULIN LISPRO 3 UNITS: 100 INJECTION, SOLUTION INTRAVENOUS; SUBCUTANEOUS at 17:04

## 2020-02-25 RX ADMIN — BUSPIRONE HYDROCHLORIDE 10 MG: 10 TABLET ORAL at 08:37

## 2020-02-26 LAB
GLUCOSE BLDC GLUCOMTR-MCNC: 195 MG/DL (ref 70–130)
GLUCOSE BLDC GLUCOMTR-MCNC: 204 MG/DL (ref 70–130)
GLUCOSE BLDC GLUCOMTR-MCNC: 242 MG/DL (ref 70–130)
GLUCOSE BLDC GLUCOMTR-MCNC: 314 MG/DL (ref 70–130)

## 2020-02-26 PROCEDURE — 97535 SELF CARE MNGMENT TRAINING: CPT

## 2020-02-26 PROCEDURE — 82962 GLUCOSE BLOOD TEST: CPT

## 2020-02-26 PROCEDURE — 97110 THERAPEUTIC EXERCISES: CPT

## 2020-02-26 PROCEDURE — 63710000001 INSULIN LISPRO (HUMAN) PER 5 UNITS: Performed by: PHYSICIAN ASSISTANT

## 2020-02-26 PROCEDURE — 99233 SBSQ HOSP IP/OBS HIGH 50: CPT | Performed by: NURSE PRACTITIONER

## 2020-02-26 RX ORDER — CARVEDILOL 12.5 MG/1
12.5 TABLET ORAL NIGHTLY
Status: DISCONTINUED | OUTPATIENT
Start: 2020-02-26 | End: 2020-02-27

## 2020-02-26 RX ORDER — LABETALOL HYDROCHLORIDE 5 MG/ML
10 INJECTION, SOLUTION INTRAVENOUS EVERY 6 HOURS PRN
Status: DISCONTINUED | OUTPATIENT
Start: 2020-02-26 | End: 2020-02-27 | Stop reason: HOSPADM

## 2020-02-26 RX ADMIN — INSULIN LISPRO 5 UNITS: 100 INJECTION, SOLUTION INTRAVENOUS; SUBCUTANEOUS at 21:08

## 2020-02-26 RX ADMIN — DIGOXIN 125 MCG: 125 TABLET ORAL at 12:18

## 2020-02-26 RX ADMIN — VENLAFAXINE HYDROCHLORIDE 150 MG: 75 CAPSULE, EXTENDED RELEASE ORAL at 08:25

## 2020-02-26 RX ADMIN — INSULIN LISPRO 2 UNITS: 100 INJECTION, SOLUTION INTRAVENOUS; SUBCUTANEOUS at 12:18

## 2020-02-26 RX ADMIN — INSULIN LISPRO 2 UNITS: 100 INJECTION, SOLUTION INTRAVENOUS; SUBCUTANEOUS at 08:24

## 2020-02-26 RX ADMIN — BUSPIRONE HYDROCHLORIDE 10 MG: 10 TABLET ORAL at 08:25

## 2020-02-26 RX ADMIN — INSULIN LISPRO 3 UNITS: 100 INJECTION, SOLUTION INTRAVENOUS; SUBCUTANEOUS at 17:05

## 2020-02-26 RX ADMIN — FAMOTIDINE 20 MG: 20 TABLET ORAL at 08:24

## 2020-02-26 RX ADMIN — AMLODIPINE BESYLATE 10 MG: 10 TABLET ORAL at 08:24

## 2020-02-26 RX ADMIN — BUSPIRONE HYDROCHLORIDE 10 MG: 10 TABLET ORAL at 19:28

## 2020-02-26 RX ADMIN — PRAVASTATIN SODIUM 80 MG: 20 TABLET ORAL at 08:25

## 2020-02-26 RX ADMIN — DOCUSATE SODIUM 100 MG: 100 CAPSULE ORAL at 12:18

## 2020-02-26 RX ADMIN — BUSPIRONE HYDROCHLORIDE 10 MG: 10 TABLET ORAL at 17:05

## 2020-02-26 RX ADMIN — BISACODYL 10 MG: 5 TABLET, COATED ORAL at 12:18

## 2020-02-26 RX ADMIN — FUROSEMIDE 40 MG: 40 TABLET ORAL at 08:25

## 2020-02-26 RX ADMIN — OXYCODONE HYDROCHLORIDE 5 MG: 5 TABLET ORAL at 08:30

## 2020-02-26 RX ADMIN — CARVEDILOL 12.5 MG: 12.5 TABLET, FILM COATED ORAL at 19:29

## 2020-02-26 RX ADMIN — CARVEDILOL 25 MG: 12.5 TABLET, FILM COATED ORAL at 08:24

## 2020-02-27 VITALS
OXYGEN SATURATION: 91 % | DIASTOLIC BLOOD PRESSURE: 62 MMHG | WEIGHT: 197.6 LBS | HEART RATE: 86 BPM | BODY MASS INDEX: 35.01 KG/M2 | HEIGHT: 63 IN | SYSTOLIC BLOOD PRESSURE: 107 MMHG | RESPIRATION RATE: 16 BRPM | TEMPERATURE: 98.5 F

## 2020-02-27 LAB
GLUCOSE BLDC GLUCOMTR-MCNC: 256 MG/DL (ref 70–130)
GLUCOSE BLDC GLUCOMTR-MCNC: 318 MG/DL (ref 70–130)

## 2020-02-27 PROCEDURE — 63710000001 INSULIN LISPRO (HUMAN) PER 5 UNITS: Performed by: PHYSICIAN ASSISTANT

## 2020-02-27 PROCEDURE — 97530 THERAPEUTIC ACTIVITIES: CPT | Performed by: OCCUPATIONAL THERAPIST

## 2020-02-27 PROCEDURE — 97110 THERAPEUTIC EXERCISES: CPT | Performed by: OCCUPATIONAL THERAPIST

## 2020-02-27 PROCEDURE — 99239 HOSP IP/OBS DSCHRG MGMT >30: CPT | Performed by: NURSE PRACTITIONER

## 2020-02-27 PROCEDURE — 63710000001 INSULIN DETEMIR PER 5 UNITS: Performed by: NURSE PRACTITIONER

## 2020-02-27 PROCEDURE — 82962 GLUCOSE BLOOD TEST: CPT

## 2020-02-27 PROCEDURE — 97535 SELF CARE MNGMENT TRAINING: CPT | Performed by: OCCUPATIONAL THERAPIST

## 2020-02-27 RX ORDER — BISACODYL 5 MG/1
10 TABLET, DELAYED RELEASE ORAL DAILY PRN
Start: 2020-02-27

## 2020-02-27 RX ORDER — CARVEDILOL 12.5 MG/1
25 TABLET ORAL EVERY 12 HOURS SCHEDULED
Status: DISCONTINUED | OUTPATIENT
Start: 2020-02-27 | End: 2020-02-27 | Stop reason: HOSPADM

## 2020-02-27 RX ORDER — PSEUDOEPHEDRINE HCL 30 MG
100 TABLET ORAL 2 TIMES DAILY PRN
Start: 2020-02-27

## 2020-02-27 RX ADMIN — INSULIN DETEMIR 5 UNITS: 100 INJECTION, SOLUTION SUBCUTANEOUS at 09:00

## 2020-02-27 RX ADMIN — INSULIN LISPRO 4 UNITS: 100 INJECTION, SOLUTION INTRAVENOUS; SUBCUTANEOUS at 09:06

## 2020-02-27 RX ADMIN — LEVOTHYROXINE SODIUM 175 MCG: 150 TABLET ORAL at 05:39

## 2020-02-27 RX ADMIN — PRAVASTATIN SODIUM 80 MG: 20 TABLET ORAL at 08:57

## 2020-02-27 RX ADMIN — DIGOXIN 125 MCG: 125 TABLET ORAL at 12:32

## 2020-02-27 RX ADMIN — FAMOTIDINE 20 MG: 20 TABLET ORAL at 08:59

## 2020-02-27 RX ADMIN — INSULIN LISPRO 5 UNITS: 100 INJECTION, SOLUTION INTRAVENOUS; SUBCUTANEOUS at 12:45

## 2020-02-27 RX ADMIN — FUROSEMIDE 40 MG: 40 TABLET ORAL at 08:57

## 2020-02-27 RX ADMIN — VENLAFAXINE HYDROCHLORIDE 150 MG: 75 CAPSULE, EXTENDED RELEASE ORAL at 08:58

## 2020-02-27 RX ADMIN — BUSPIRONE HYDROCHLORIDE 10 MG: 10 TABLET ORAL at 08:58

## 2020-02-28 ENCOUNTER — READMISSION MANAGEMENT (OUTPATIENT)
Dept: CALL CENTER | Facility: HOSPITAL | Age: 70
End: 2020-02-28

## 2020-02-28 NOTE — OUTREACH NOTE
Prep Survey      Responses   Facility patient discharged from?  [place]   Is patient eligible?  No   What are the reasons patient is not eligible?  [facility]   Does the patient have one of the following disease processes/diagnoses(primary or secondary)?  Total Joint Replacement   Prep survey completed?  Yes          Donna Aden RN

## 2020-03-10 ENCOUNTER — READMISSION MANAGEMENT (OUTPATIENT)
Dept: CALL CENTER | Facility: HOSPITAL | Age: 70
End: 2020-03-10

## 2020-03-10 NOTE — OUTREACH NOTE
Prep Survey      Responses   [facility] patient discharged from?  [place]   Is LACE score < 7 ?  No   Eligibility  Read[facility]mt   Does the patient have one of the following disease processes/diagnoses(primary or secondary)?  Total Joint Replacement   Prep survey completed?  Yes          Maria G Davis RN

## 2020-03-31 ENCOUNTER — READMISSION MANAGEMENT (OUTPATIENT)
Dept: CALL CENTER | Facility: HOSPITAL | Age: 70
End: 2020-03-31

## 2020-03-31 NOTE — OUTREACH NOTE
Total Joint Month 1 Survey      Responses   [facility] patient discharged from?  [place]   Does the patient have one of the following disease processes/diagnoses(primary or secondary)?  Total Joint Replacement   Joint surgery performed?  Shoulder   Month 1 attempt successful?  Yes   Call start time  0951   Call end time  0955   Has the patient been back in either the hospital or Emergency Department since discharge?  No   Person spoke with today (if not patient) and relationship  Mr Solorio   Is the patient taking all medications as directed (includes completed medication regime)?  Yes   Is the patient able to teach back alternate methods of pain control?  Ice, Shoulder-elevate above heart/ keep in sling as advised, Reposition   Has the patient kept scheduled appointments due by today?  Yes   Is the patient still receiving Home Health Services?  N/A   Is the patient still attending therapy sessions(either in the home or as an outpatient)?  No   Has the patient fallen since discharge?  No   Comments  Pt is still wearing arm in sling. Pt is grumpy per her friend, denies pain. appetite is WNL.    What is the patient's perception of their functional status since discharge?  Improving   Is the patient able to teach back signs and symptoms of infection?  Temp >100.4 for 24h or longer, Blisters around incision, Changes in mobility   Is the patient/caregiver able to teach back the hierarchy of who to call/visit for symptoms/problems? PCP, Specialist, Home health nurse, Urgent Care, ED, 911  Yes   Month 1 call completed?  Yes          Estefania Yarbrough RN

## 2020-04-30 ENCOUNTER — READMISSION MANAGEMENT (OUTPATIENT)
Dept: CALL CENTER | Facility: HOSPITAL | Age: 70
End: 2020-04-30

## 2020-04-30 NOTE — OUTREACH NOTE
Total Joint Month 2 Survey      Responses   [facility] [place] patient discharged from?  [place]   Does the patient have one of the following disease processes/diagnoses(primary or secondary)?  Total Joint Replacement   Joint surgery performed?  Shoulder   Month 2 attempt successful?  Yes   Call start time  1810   Call end time  1811   Has the patient been back in either the hospital or Emergency Department since discharge?  No   Person spoke with today (if not patient) and relationship  Mr Solorio   Is the patient taking all medications as directed (includes completed medication regime)?  Yes   Is the patient able to teach back alternate methods of pain control?  Shoulder-elevate above heart/ keep in sling as advised, Short, frequent activity   Has the patient kept scheduled appointments due by today?  Yes   Is the patient still receiving Home Health Services?  N/A   Is the patient still attending therapy sessions(either in the home or as an outpatient)?  No   Has the patient fallen since discharge?  No   Comments  He says she is doing fine.  No pain, no ice.     What is the patient's perception of their functional status since discharge?  Improving   Is the patient able to teach back signs and symptoms of infection?  Temp >100.4 for 24h or longer, Increased swelling or redness around incision (not associated with surgical edema), Severe discomfort or pain   Is the patient/caregiver able to teach back the hierarchy of who to call/visit for symptoms/problems? PCP, Specialist, Home health nurse, Urgent Care, ED, 911  Yes   Additional teach back comments  He says she is doing well, no issues to report.   Month 2 Call Completed?  Yes          Gabi Frost RN

## 2020-06-01 ENCOUNTER — READMISSION MANAGEMENT (OUTPATIENT)
Dept: CALL CENTER | Facility: HOSPITAL | Age: 70
End: 2020-06-01

## 2020-06-01 NOTE — OUTREACH NOTE
Total Joint Month 3 Survey      Responses   [facility] patient discharged from?  [place]   Does the patient have one of the following disease processes/diagnoses(primary or secondary)?  Total Joint Replacement   Joint surgery performed?  Shoulder   Month 3 attempt successful?  Yes   Call start time  1406   Call end time  1408   Has the patient been back in either the hospital or Emergency Department since discharge?  No   Person spoke with today (if not patient) and relationship  Daughter   Is the patient taking all medications as directed (includes completed medication regime)?  Yes   Has the patient kept scheduled appointments due by today?  Yes   Is the patient still receiving Home Health Services?  N/A   Is the patient still attending therapy sessions(either in the home or as an outpatient)?  Yes   Has the patient fallen since discharge?  No   Comments  Daughter states pt has been going to PT and doing very well.    What is the patient's perception of their functional status since discharge?  Improving   Is the patient/caregiver able to teach back the hierarchy of who to call/visit for symptoms/problems? PCP, Specialist, Home health nurse, Urgent Care, ED, 911  Yes   Graduated  Yes   Did the patient feel the follow up calls were helpful during their recovery period?  Yes   Was the number of calls appropriate?  Yes   Wrap up additional comments  Daughter reports patient is doing great and denies any needs during this call.           Vani Bowman RN

## 2021-04-20 ENCOUNTER — HOSPITAL ENCOUNTER (EMERGENCY)
Facility: HOSPITAL | Age: 71
Discharge: HOME | End: 2021-04-20
Attending: STUDENT IN AN ORGANIZED HEALTH CARE EDUCATION/TRAINING PROGRAM | Admitting: STUDENT IN AN ORGANIZED HEALTH CARE EDUCATION/TRAINING PROGRAM

## 2021-04-20 VITALS
HEIGHT: 62 IN | TEMPERATURE: 98.3 F | RESPIRATION RATE: 16 BRPM | HEART RATE: 81 BPM | OXYGEN SATURATION: 95 % | WEIGHT: 190.2 LBS | SYSTOLIC BLOOD PRESSURE: 147 MMHG | BODY MASS INDEX: 35 KG/M2 | DIASTOLIC BLOOD PRESSURE: 87 MMHG

## 2021-04-20 DIAGNOSIS — R11.0 NAUSEA: Primary | ICD-10-CM

## 2021-04-20 DIAGNOSIS — R19.7 DIARRHEA, UNSPECIFIED TYPE: ICD-10-CM

## 2021-04-20 LAB
ALBUMIN SERPL-MCNC: 4.4 G/DL (ref 3.5–5.2)
ALBUMIN/GLOB SERPL: 1.6 G/DL
ALP SERPL-CCNC: 48 U/L (ref 39–117)
ALT SERPL W P-5'-P-CCNC: 27 U/L (ref 1–33)
ANION GAP SERPL CALCULATED.3IONS-SCNC: 12.7 MMOL/L (ref 5–15)
AST SERPL-CCNC: 26 U/L (ref 1–32)
BACTERIA UR QL AUTO: ABNORMAL /HPF
BASOPHILS # BLD AUTO: 0.03 10*3/MM3 (ref 0–0.2)
BASOPHILS NFR BLD AUTO: 0.3 % (ref 0–1.5)
BILIRUB SERPL-MCNC: 0.2 MG/DL (ref 0–1.2)
BILIRUB UR QL STRIP: NEGATIVE
BUN SERPL-MCNC: 25 MG/DL (ref 8–23)
BUN/CREAT SERPL: 28.7 (ref 7–25)
CALCIUM SPEC-SCNC: 10.2 MG/DL (ref 8.6–10.5)
CHLORIDE SERPL-SCNC: 99 MMOL/L (ref 98–107)
CLARITY UR: CLEAR
CO2 SERPL-SCNC: 27.3 MMOL/L (ref 22–29)
COLOR UR: YELLOW
CREAT SERPL-MCNC: 0.87 MG/DL (ref 0.57–1)
DEPRECATED RDW RBC AUTO: 45.9 FL (ref 37–54)
DIGOXIN SERPL-MCNC: 0.61 NG/ML (ref 0.6–1.2)
EOSINOPHIL # BLD AUTO: 0.19 10*3/MM3 (ref 0–0.4)
EOSINOPHIL NFR BLD AUTO: 2.1 % (ref 0.3–6.2)
ERYTHROCYTE [DISTWIDTH] IN BLOOD BY AUTOMATED COUNT: 13.2 % (ref 12.3–15.4)
GFR SERPL CREATININE-BSD FRML MDRD: 64 ML/MIN/1.73
GLOBULIN UR ELPH-MCNC: 2.8 GM/DL
GLUCOSE SERPL-MCNC: 136 MG/DL (ref 65–99)
GLUCOSE UR STRIP-MCNC: NEGATIVE MG/DL
HCT VFR BLD AUTO: 42.1 % (ref 34–46.6)
HGB BLD-MCNC: 13.9 G/DL (ref 12–15.9)
HGB UR QL STRIP.AUTO: NEGATIVE
HYALINE CASTS UR QL AUTO: ABNORMAL /LPF
IMM GRANULOCYTES # BLD AUTO: 0.03 10*3/MM3 (ref 0–0.05)
IMM GRANULOCYTES NFR BLD AUTO: 0.3 % (ref 0–0.5)
KETONES UR QL STRIP: NEGATIVE
LEUKOCYTE ESTERASE UR QL STRIP.AUTO: ABNORMAL
LIPASE SERPL-CCNC: 87 U/L (ref 13–60)
LYMPHOCYTES # BLD AUTO: 2.63 10*3/MM3 (ref 0.7–3.1)
LYMPHOCYTES NFR BLD AUTO: 29.3 % (ref 19.6–45.3)
MCH RBC QN AUTO: 31.1 PG (ref 26.6–33)
MCHC RBC AUTO-ENTMCNC: 33 G/DL (ref 31.5–35.7)
MCV RBC AUTO: 94.2 FL (ref 79–97)
MONOCYTES # BLD AUTO: 0.63 10*3/MM3 (ref 0.1–0.9)
MONOCYTES NFR BLD AUTO: 7 % (ref 5–12)
NEUTROPHILS NFR BLD AUTO: 5.48 10*3/MM3 (ref 1.7–7)
NEUTROPHILS NFR BLD AUTO: 61 % (ref 42.7–76)
NITRITE UR QL STRIP: NEGATIVE
NRBC BLD AUTO-RTO: 0 /100 WBC (ref 0–0.2)
PH UR STRIP.AUTO: <=5 [PH] (ref 5–8)
PLATELET # BLD AUTO: 282 10*3/MM3 (ref 140–450)
PMV BLD AUTO: 11.7 FL (ref 6–12)
POTASSIUM SERPL-SCNC: 3.9 MMOL/L (ref 3.5–5.2)
PROT SERPL-MCNC: 7.2 G/DL (ref 6–8.5)
PROT UR QL STRIP: NEGATIVE
RBC # BLD AUTO: 4.47 10*6/MM3 (ref 3.77–5.28)
RBC # UR: ABNORMAL /HPF
REF LAB TEST METHOD: ABNORMAL
SODIUM SERPL-SCNC: 139 MMOL/L (ref 136–145)
SP GR UR STRIP: 1.01 (ref 1–1.03)
SQUAMOUS #/AREA URNS HPF: ABNORMAL /HPF
UROBILINOGEN UR QL STRIP: ABNORMAL
WBC # BLD AUTO: 8.99 10*3/MM3 (ref 3.4–10.8)
WBC UR QL AUTO: ABNORMAL /HPF

## 2021-04-20 PROCEDURE — 99283 EMERGENCY DEPT VISIT LOW MDM: CPT

## 2021-04-20 PROCEDURE — 80162 ASSAY OF DIGOXIN TOTAL: CPT | Performed by: STUDENT IN AN ORGANIZED HEALTH CARE EDUCATION/TRAINING PROGRAM

## 2021-04-20 PROCEDURE — 63710000001 PROMETHAZINE PER 25 MG: Performed by: STUDENT IN AN ORGANIZED HEALTH CARE EDUCATION/TRAINING PROGRAM

## 2021-04-20 PROCEDURE — 81001 URINALYSIS AUTO W/SCOPE: CPT | Performed by: STUDENT IN AN ORGANIZED HEALTH CARE EDUCATION/TRAINING PROGRAM

## 2021-04-20 PROCEDURE — 93005 ELECTROCARDIOGRAM TRACING: CPT | Performed by: STUDENT IN AN ORGANIZED HEALTH CARE EDUCATION/TRAINING PROGRAM

## 2021-04-20 PROCEDURE — 85025 COMPLETE CBC W/AUTO DIFF WBC: CPT | Performed by: STUDENT IN AN ORGANIZED HEALTH CARE EDUCATION/TRAINING PROGRAM

## 2021-04-20 PROCEDURE — 80053 COMPREHEN METABOLIC PANEL: CPT | Performed by: STUDENT IN AN ORGANIZED HEALTH CARE EDUCATION/TRAINING PROGRAM

## 2021-04-20 PROCEDURE — 83690 ASSAY OF LIPASE: CPT | Performed by: STUDENT IN AN ORGANIZED HEALTH CARE EDUCATION/TRAINING PROGRAM

## 2021-04-20 RX ORDER — PROMETHAZINE HYDROCHLORIDE 25 MG/1
25 TABLET ORAL EVERY 6 HOURS PRN
Qty: 20 TABLET | Refills: 0 | Status: SHIPPED | OUTPATIENT
Start: 2021-04-20

## 2021-04-20 RX ORDER — PROMETHAZINE HYDROCHLORIDE 25 MG/1
25 TABLET ORAL ONCE
Status: COMPLETED | OUTPATIENT
Start: 2021-04-20 | End: 2021-04-20

## 2021-04-20 RX ADMIN — PROMETHAZINE HYDROCHLORIDE 25 MG: 25 TABLET ORAL at 20:16

## 2021-04-20 NOTE — ED NOTES
contacted. Patient stated woke up this morning and was dizzy. Felt nauseated all day. Pt ate breakfast and nothing else the rest of the day due to nausea.      Karolina Harris, RN  04/20/21 1946

## 2021-04-20 NOTE — ED PROVIDER NOTES
"Subjective   70-year-old female that presents with nausea and 2 bouts of diarrhea.  Patient states she woke up this morning and felt dizzy has been nauseous all day.  States she did manage to eat breakfast but that is it.  Patient is deaf and all history was taken using a .  She does state that she has had 2 bouts of watery diarrhea that was nonbloody.  She does state that she has not had any shortness of breath, chest pain, or abdominal pain.  Patient reports that she has not vomited today.  Patient's significant other states that they contacted their doctor and were told to come to the emergency department.  Nothing makes her symptoms better or worse.          Review of Systems   All other systems reviewed and are negative.      Past Medical History:   Diagnosis Date   • Arthritis    • Cataract     unsure which eye, mild   • Deaf    • Diabetes mellitus (CMS/HCC)     doesnt check sugar often    • Disease of thyroid gland    • GERD (gastroesophageal reflux disease)    • Heartburn     occasionally   • History of kidney stones    • History of transfusion     FFP   2019    • Hyperlipidemia    • Hypertension    • Kidney disorder     saw doctor and states dr said (left?) kidney swollen but pt had no s/s- following up with nephro to check out later    • NICM (nonischemic cardiomyopathy) (CMS/HCC)    • Psoriasis     \"vaginal area\"   • Psoriatic arthritis (CMS/HCC)    • Wears dentures     upper    • Wears glasses        Allergies   Allergen Reactions   • Lisinopril Angioedema   • Entresto [Sacubitril-Valsartan] Hives       Past Surgical History:   Procedure Laterality Date   • CARDIAC DEFIBRILLATOR PLACEMENT     • CHOLECYSTECTOMY     • COLONOSCOPY  2019   • ENDOSCOPY     • TOTAL SHOULDER ARTHROPLASTY Left 2/24/2020    Procedure: TOTAL SHOULDER ARTHROPLASTY REVERSE LEFT;  Surgeon: Carl Perez MD;  Location: [facility] [place] [facility];  Service: Orthopedics;  Laterality: Left;   • TOTAL SHOULDER ARTHROPLASTY W/ DISTAL " CLAVICLE EXCISION Right 2019    Procedure: TOTAL SHOULDER REVERSE ARTHROPLASTY WITH BICEP TENODESIS RIGHT;  Surgeon: Carl Perez MD;  Location: [facility];  Service: Orthopedics   • TUBAL ABDOMINAL LIGATION         History reviewed. No pertinent family history.    Social History     Socioeconomic History   • Marital status: Single     Spouse name: Not on file   • Number of children: Not on file   • Years of education: Not on file   • Highest education level: Not on file   Tobacco Use   • Smoking status: Former Smoker     Packs/day: 1.00     Years: 19.00     Pack years: 19.00     Types: Cigarettes     Quit date:      Years since quittin.3   • Smokeless tobacco: Never Used   Substance and Sexual Activity   • Alcohol use: No   • Drug use: No   • Sexual activity: Defer           Objective   Physical Exam  Vitals and nursing note reviewed.     GEN: No acute distress  Head: Normocephalic, atraumatic  Eyes: Pupils equal round reactive to light, extraocular movements are intact, no nystagmus  ENT: Posterior pharynx normal in appearance, oral mucosa is moist  Chest: Nontender to palpation  Cardiovascular: Regular rate  Lungs: Clear to auscultation bilaterally  Abdomen: Soft, nontender, nondistended, no peritoneal signs  Extremities: No edema, normal appearance  Neuro: Patient is alert, follows commands moves all 4 extremities, no dysmetria or issues with coordination, patient is deaf   psych: Mood and affect are appropriate    Procedures           ED Course  ED Course as of 2238   Tue  EKG shows electronic trickle pacemaker with a rate of 82.  No other interpretation.  Abnormal EKG.  Interpreted by me.    [DT]    Lipase(!): 87 [DT]    Digoxin: 0.61 [DT]    Glucose(!): 136 [DT]    BUN(!): 25 [DT]    Creatinine: 0.87 [DT]    Sodium: 139 [DT]    Potassium: 3.9 [DT]    Chloride: 99 [DT]    CO2: 27.3 [DT]    WBC, UA(!): 3-5 [DT]     Bacteria, UA(!): Trace [DT]   2238 Squamous Epithelial Cells, UA: 0-2 [DT]   2238 Leukocytes, UA(!): Trace [DT]   2238 Nitrite, UA: Negative [DT]   2238 WBC: 8.99 [DT]   2238 Hemoglobin: 13.9 [DT]   2238 Hematocrit: 42.1 [DT]   2238 Platelets: 282 [DT]      ED Course User Index  [DT] Francisco J Rivera MD                                           MDM  Number of Diagnoses or Management Options  Diarrhea, unspecified type  Nausea  Diagnosis management comments: We will treat the patient's nausea with oral antiemetics.  Will perform screening laboratories.  Will obtain an EKG even though I believe it is unlikely that this is acute coronary syndrome.       Amount and/or Complexity of Data Reviewed  Clinical lab tests: ordered and reviewed  Discussion of test results with the performing providers: yes  Decide to obtain previous medical records or to obtain history from someone other than the patient: yes  Obtain history from someone other than the patient: yes  Review and summarize past medical records: yes        Final diagnoses:   Nausea   Diarrhea, unspecified type       ED Disposition  ED Disposition     ED Disposition Condition Comment    Discharge Stable           Alley Finney MD  [address] [facility] [address]  [place] [address]  [phone]    In 3 days  if not improving         Medication List      New Prescriptions    promethazine 25 MG tablet  Commonly known as: PHENERGAN  Take 1 tablet by mouth Every 6 (Six) Hours As Needed for Nausea.           Where to Get Your Medications      These medications were sent to [facility] [address] [place] [address] - [address] [place] [address] - [phone]  - [phone]   [address] [place] [address]    Phone: [phone]   · promethazine 25 MG tablet          Francisco J Rivera MD  04/20/21 2239

## 2022-01-11 NOTE — PLAN OF CARE
Problem: Patient Care Overview  Goal: Plan of Care Review  Outcome: Ongoing (interventions implemented as appropriate)   01/23/19 4967   OTHER   Outcome Summary Patient is alert and orineted. C/o pian at times PRN pain meds given and effective. Works with OT. DSG to left shoulder CDI. She continues to drop 02 sats on room air. Wears 1-2 liters n/c at all times. Ambulates with assist x 1 sling in place to left shoudler at all times   Coping/Psychosocial   Plan of Care Reviewed With patient          You are scheduled for Breast MRI at [facility] on 1/27/22 at 5pm

## 2022-06-13 ENCOUNTER — APPOINTMENT (OUTPATIENT)
Dept: CT IMAGING | Facility: HOSPITAL | Age: 72
End: 2022-06-13

## 2022-06-13 ENCOUNTER — HOSPITAL ENCOUNTER (EMERGENCY)
Facility: HOSPITAL | Age: 72
Discharge: HOME | End: 2022-06-13
Attending: EMERGENCY MEDICINE | Admitting: EMERGENCY MEDICINE

## 2022-06-13 VITALS
OXYGEN SATURATION: 90 % | HEART RATE: 75 BPM | WEIGHT: 170 LBS | TEMPERATURE: 98.3 F | RESPIRATION RATE: 20 BRPM | HEIGHT: 63 IN | SYSTOLIC BLOOD PRESSURE: 129 MMHG | BODY MASS INDEX: 30.12 KG/M2 | DIASTOLIC BLOOD PRESSURE: 75 MMHG

## 2022-06-13 DIAGNOSIS — I26.93 SINGLE SUBSEGMENTAL PULMONARY EMBOLISM WITHOUT ACUTE COR PULMONALE: Primary | ICD-10-CM

## 2022-06-13 DIAGNOSIS — R93.429 ABNORMAL CT SCAN, KIDNEY: ICD-10-CM

## 2022-06-13 LAB
ALBUMIN SERPL-MCNC: 4.4 G/DL (ref 3.5–5.2)
ALBUMIN/GLOB SERPL: 1.8 G/DL
ALP SERPL-CCNC: 45 U/L (ref 39–117)
ALT SERPL W P-5'-P-CCNC: 15 U/L (ref 1–33)
ANION GAP SERPL CALCULATED.3IONS-SCNC: 14.5 MMOL/L (ref 5–15)
AST SERPL-CCNC: 19 U/L (ref 1–32)
BASOPHILS # BLD AUTO: 0.04 10*3/MM3 (ref 0–0.2)
BASOPHILS NFR BLD AUTO: 0.5 % (ref 0–1.5)
BILIRUB SERPL-MCNC: 0.3 MG/DL (ref 0–1.2)
BILIRUB UR QL STRIP: NEGATIVE
BUN SERPL-MCNC: 19 MG/DL (ref 8–23)
BUN/CREAT SERPL: 24.7 (ref 7–25)
CALCIUM SPEC-SCNC: 10.1 MG/DL (ref 8.6–10.5)
CHLORIDE SERPL-SCNC: 103 MMOL/L (ref 98–107)
CLARITY UR: CLEAR
CO2 SERPL-SCNC: 26.5 MMOL/L (ref 22–29)
COLOR UR: YELLOW
CREAT SERPL-MCNC: 0.77 MG/DL (ref 0.57–1)
D DIMER PPP FEU-MCNC: 1.14 MCGFEU/ML (ref 0–0.57)
DEPRECATED RDW RBC AUTO: 44.5 FL (ref 37–54)
EGFRCR SERPLBLD CKD-EPI 2021: 82.6 ML/MIN/1.73
EOSINOPHIL # BLD AUTO: 0.14 10*3/MM3 (ref 0–0.4)
EOSINOPHIL NFR BLD AUTO: 1.6 % (ref 0.3–6.2)
ERYTHROCYTE [DISTWIDTH] IN BLOOD BY AUTOMATED COUNT: 13.2 % (ref 12.3–15.4)
GLOBULIN UR ELPH-MCNC: 2.5 GM/DL
GLUCOSE SERPL-MCNC: 162 MG/DL (ref 65–99)
GLUCOSE UR STRIP-MCNC: ABNORMAL MG/DL
HCT VFR BLD AUTO: 37.8 % (ref 34–46.6)
HGB BLD-MCNC: 12.9 G/DL (ref 12–15.9)
HGB UR QL STRIP.AUTO: NEGATIVE
IMM GRANULOCYTES # BLD AUTO: 0.02 10*3/MM3 (ref 0–0.05)
IMM GRANULOCYTES NFR BLD AUTO: 0.2 % (ref 0–0.5)
KETONES UR QL STRIP: NEGATIVE
LEUKOCYTE ESTERASE UR QL STRIP.AUTO: NEGATIVE
LIPASE SERPL-CCNC: 65 U/L (ref 13–60)
LYMPHOCYTES # BLD AUTO: 2.03 10*3/MM3 (ref 0.7–3.1)
LYMPHOCYTES NFR BLD AUTO: 23.7 % (ref 19.6–45.3)
MCH RBC QN AUTO: 31.4 PG (ref 26.6–33)
MCHC RBC AUTO-ENTMCNC: 34.1 G/DL (ref 31.5–35.7)
MCV RBC AUTO: 92 FL (ref 79–97)
MONOCYTES # BLD AUTO: 0.7 10*3/MM3 (ref 0.1–0.9)
MONOCYTES NFR BLD AUTO: 8.2 % (ref 5–12)
NEUTROPHILS NFR BLD AUTO: 5.62 10*3/MM3 (ref 1.7–7)
NEUTROPHILS NFR BLD AUTO: 65.8 % (ref 42.7–76)
NITRITE UR QL STRIP: NEGATIVE
NRBC BLD AUTO-RTO: 0 /100 WBC (ref 0–0.2)
PH UR STRIP.AUTO: 6 [PH] (ref 5–8)
PLATELET # BLD AUTO: 254 10*3/MM3 (ref 140–450)
PMV BLD AUTO: 11.6 FL (ref 6–12)
POTASSIUM SERPL-SCNC: 3.6 MMOL/L (ref 3.5–5.2)
PROT SERPL-MCNC: 6.9 G/DL (ref 6–8.5)
PROT UR QL STRIP: NEGATIVE
RBC # BLD AUTO: 4.11 10*6/MM3 (ref 3.77–5.28)
SODIUM SERPL-SCNC: 144 MMOL/L (ref 136–145)
SP GR UR STRIP: 1.01 (ref 1–1.03)
TROPONIN T SERPL-MCNC: <0.01 NG/ML (ref 0–0.03)
UROBILINOGEN UR QL STRIP: ABNORMAL
WBC NRBC COR # BLD: 8.55 10*3/MM3 (ref 3.4–10.8)

## 2022-06-13 PROCEDURE — 96374 THER/PROPH/DIAG INJ IV PUSH: CPT

## 2022-06-13 PROCEDURE — 84484 ASSAY OF TROPONIN QUANT: CPT | Performed by: PHYSICIAN ASSISTANT

## 2022-06-13 PROCEDURE — 81003 URINALYSIS AUTO W/O SCOPE: CPT | Performed by: PHYSICIAN ASSISTANT

## 2022-06-13 PROCEDURE — 85025 COMPLETE CBC W/AUTO DIFF WBC: CPT | Performed by: PHYSICIAN ASSISTANT

## 2022-06-13 PROCEDURE — 96376 TX/PRO/DX INJ SAME DRUG ADON: CPT

## 2022-06-13 PROCEDURE — 25010000002 IOPAMIDOL 61 % SOLUTION: Performed by: EMERGENCY MEDICINE

## 2022-06-13 PROCEDURE — 83690 ASSAY OF LIPASE: CPT | Performed by: PHYSICIAN ASSISTANT

## 2022-06-13 PROCEDURE — 25010000002 ONDANSETRON PER 1 MG: Performed by: PHYSICIAN ASSISTANT

## 2022-06-13 PROCEDURE — 99283 EMERGENCY DEPT VISIT LOW MDM: CPT

## 2022-06-13 PROCEDURE — 71275 CT ANGIOGRAPHY CHEST: CPT

## 2022-06-13 PROCEDURE — 25010000002 MORPHINE PER 10 MG: Performed by: EMERGENCY MEDICINE

## 2022-06-13 PROCEDURE — 93005 ELECTROCARDIOGRAM TRACING: CPT | Performed by: PHYSICIAN ASSISTANT

## 2022-06-13 PROCEDURE — 74177 CT ABD & PELVIS W/CONTRAST: CPT

## 2022-06-13 PROCEDURE — 80053 COMPREHEN METABOLIC PANEL: CPT | Performed by: PHYSICIAN ASSISTANT

## 2022-06-13 PROCEDURE — 96375 TX/PRO/DX INJ NEW DRUG ADDON: CPT

## 2022-06-13 PROCEDURE — 85379 FIBRIN DEGRADATION QUANT: CPT | Performed by: PHYSICIAN ASSISTANT

## 2022-06-13 RX ORDER — MORPHINE SULFATE 2 MG/ML
2 INJECTION, SOLUTION INTRAMUSCULAR; INTRAVENOUS ONCE
Status: COMPLETED | OUTPATIENT
Start: 2022-06-13 | End: 2022-06-13

## 2022-06-13 RX ORDER — HYDROCODONE BITARTRATE AND ACETAMINOPHEN 5; 325 MG/1; MG/1
1 TABLET ORAL ONCE
Status: COMPLETED | OUTPATIENT
Start: 2022-06-13 | End: 2022-06-13

## 2022-06-13 RX ORDER — RIVAROXABAN 15 MG-20MG
KIT ORAL
Qty: 51 EACH | Refills: 0 | Status: SHIPPED | OUTPATIENT
Start: 2022-06-13

## 2022-06-13 RX ORDER — SODIUM CHLORIDE 0.9 % (FLUSH) 0.9 %
10 SYRINGE (ML) INJECTION AS NEEDED
Status: DISCONTINUED | OUTPATIENT
Start: 2022-06-13 | End: 2022-06-13 | Stop reason: HOSPADM

## 2022-06-13 RX ORDER — HYDROCODONE BITARTRATE AND ACETAMINOPHEN 5; 325 MG/1; MG/1
1 TABLET ORAL EVERY 6 HOURS PRN
Qty: 12 TABLET | Refills: 0 | Status: SHIPPED | OUTPATIENT
Start: 2022-06-13 | End: 2022-06-16

## 2022-06-13 RX ORDER — ONDANSETRON 2 MG/ML
4 INJECTION INTRAMUSCULAR; INTRAVENOUS ONCE
Status: COMPLETED | OUTPATIENT
Start: 2022-06-13 | End: 2022-06-13

## 2022-06-13 RX ADMIN — RIVAROXABAN 20 MG: 10 TABLET, FILM COATED ORAL at 15:05

## 2022-06-13 RX ADMIN — IOPAMIDOL 100 ML: 612 INJECTION, SOLUTION INTRAVENOUS at 12:55

## 2022-06-13 RX ADMIN — MORPHINE SULFATE 2 MG: 2 INJECTION, SOLUTION INTRAMUSCULAR; INTRAVENOUS at 13:11

## 2022-06-13 RX ADMIN — MORPHINE SULFATE 2 MG: 2 INJECTION, SOLUTION INTRAMUSCULAR; INTRAVENOUS at 11:01

## 2022-06-13 RX ADMIN — LIDOCAINE HYDROCHLORIDE: 20 SOLUTION ORAL; TOPICAL at 13:11

## 2022-06-13 RX ADMIN — HYDROCODONE BITARTRATE AND ACETAMINOPHEN 1 TABLET: 5; 325 TABLET ORAL at 15:06

## 2022-06-13 RX ADMIN — ONDANSETRON 4 MG: 2 INJECTION INTRAMUSCULAR; INTRAVENOUS at 11:00

## 2022-06-13 NOTE — ED PROVIDER NOTES
Subjective   Patient is a 71-year-old female with a history of deafness, diabetes, GERD, kidney stone, hyperlipidemia, hypertension, nonischemic cardiomyopathy, and psoriatic arthritis presenting to the ER for evaluation of right-sided pain.  Patient states upon waking on Friday she was having pain in her right upper abdomen that radiated around to her back.  She describes it as feeling like a bruise. She states it has appeared that her abdomen is swollen on that side as well..  She denies any injury or trauma to the area.  She states its been constant.  She states it is worse with deep breathing or movements.  She has not noticed any rashes.  Denies any known fever, chills, headache, dizziness, syncope, chest pain, shortness of breath, hemoptysis, dysuria, hematuria, or any other symptoms.  Denies any diarrhea or change in bowel movements.  Patient states she has had a cholecystectomy many years ago.  She states her last bowel movement was yesterday and seem to be normal for her, denies any history of constipation.          Review of Systems   Constitutional: Negative for chills and fever.   HENT: Negative.    Eyes: Negative.    Respiratory: Negative.    Cardiovascular: Negative.    Gastrointestinal: Positive for abdominal pain. Negative for diarrhea, nausea and vomiting.   Genitourinary: Positive for flank pain.   Musculoskeletal: Positive for back pain.   Skin: Negative.    Allergic/Immunologic: Negative for immunocompromised state.   Neurological: Negative.    Psychiatric/Behavioral: Negative.        Past Medical History:   Diagnosis Date   • Arthritis    • Cataract     unsure which eye, mild   • Deaf    • Diabetes mellitus (HCC)     doesnt check sugar often    • Disease of thyroid gland    • GERD (gastroesophageal reflux disease)    • Heartburn     occasionally   • History of kidney stones    • History of transfusion     FFP   2019    • Hyperlipidemia    • Hypertension    • Kidney disorder     saw doctor and  "states dr said (left?) kidney swollen but pt had no s/s- following up with nephro to check out later    • NICM (nonischemic cardiomyopathy) (HCC)    • Psoriasis     \"vaginal area\"   • Psoriatic arthritis (HCC)    • Wears dentures     upper    • Wears glasses        Allergies   Allergen Reactions   • Lisinopril Angioedema   • Entresto [Sacubitril-Valsartan] Hives       Past Surgical History:   Procedure Laterality Date   • CARDIAC DEFIBRILLATOR PLACEMENT     • CHOLECYSTECTOMY     • COLONOSCOPY     • ENDOSCOPY     • TOTAL SHOULDER ARTHROPLASTY Left 2020    Procedure: TOTAL SHOULDER ARTHROPLASTY REVERSE LEFT;  Surgeon: Carl Perez MD;  Location:  [facility];  Service: Orthopedics;  Laterality: Left;   • TOTAL SHOULDER ARTHROPLASTY W/ DISTAL CLAVICLE EXCISION Right 2019    Procedure: TOTAL SHOULDER REVERSE ARTHROPLASTY WITH BICEP TENODESIS RIGHT;  Surgeon: Carl Perez MD;  Location:  [facility];  Service: Orthopedics   • TUBAL ABDOMINAL LIGATION         History reviewed. No pertinent family history.    Social History     Socioeconomic History   • Marital status: Single   Tobacco Use   • Smoking status: Former Smoker     Packs/day: 1.00     Years: 19.00     Pack years: 19.00     Types: Cigarettes     Quit date:      Years since quittin.4   • Smokeless tobacco: Never Used   Substance and Sexual Activity   • Alcohol use: No   • Drug use: No   • Sexual activity: Defer           Objective   Physical Exam  Vitals and nursing note reviewed.     /75   Pulse 75   Temp 98.3 °F (36.8 °C) (Oral)   Resp 20   Ht 160 cm (63\")   Wt 77.1 kg (170 lb)   SpO2 90%   BMI 30.11 kg/m²     GEN: No acute distress, sitting upright in stretcher.  Awake and alert.  Does not appear septic or toxic.  She is using the ASL  to communicate.  Head: Normocephalic, atraumatic  Eyes: EOM intact  ENT: No obvious facial asymmetry, intraoral mucosa moist, nares are patent.  Chest: Nontender to " palpation  Cardiovascular: Regular rate and rhythm  Lungs: Clear to auscultation bilaterally without adventitious sounds  Abdomen: Nondistended.  Bowel sounds present.  Patient does have some swelling to the right upper quadrant of her abdomen.  There is no specific focal guarding or tenderness with palpation.  Back: No CVA tenderness  Extremities: No edema, normal appearance, full range of motion without deficits.  Neuro: GCS 15  Psych: Mood and affect are appropriate    Procedures           ED Course  ED Course as of 06/13/22 1650   Mon Jun 13, 2022   1104 EKG interpreted by me reveals paced rhythm at a rate of 78.  No ectopy. [PF]   1112 WBC: 8.55 [LA]   1112 Hemoglobin: 12.9 [LA]   1112 Platelets: 254 [LA]   1112 Color, UA: Yellow [LA]   1112 Appearance, UA: Clear [LA]   1112 pH, UA: 6.0 [LA]   1112 Specific Gravity, UA: 1.011 [LA]   1112 Glucose(!): 100 mg/dL (Trace) [LA]   1112 Ketones, UA: Negative [LA]   1112 Bilirubin, UA: Negative [LA]   1112 Blood, UA: Negative [LA]   1112 Protein, UA: Negative [LA]   1112 Leukocytes, UA: Negative [LA]   1112 Urobilinogen, UA: 0.2 E.U./dL [LA]   1112 Lipase(!): 65 [LA]   1129 Glucose(!): 162 [LA]   1129 BUN: 19 [LA]   1129 Creatinine: 0.77 [LA]   1129 Sodium: 144 [LA]   1129 Potassium: 3.6 [LA]   1129 Chloride: 103 [LA]   1129 CO2: 26.5 [LA]   1129 Calcium: 10.1 [LA]   1129 Total Protein: 6.9 [LA]   1129 Albumin: 4.40 [LA]   1129 ALT (SGPT): 15 [LA]   1129 AST (SGOT): 19 [LA]   1129 Alkaline Phosphatase: 45 [LA]   1129 Total Bilirubin: 0.3 [LA]   1129 Globulin: 2.5 [LA]   1129 A/G Ratio: 1.8 [LA]   1129 BUN/Creatinine Ratio: 24.7 [LA]   1129 Anion Gap: 14.5 [LA]   1129 eGFR: 82.6 [LA]   1142 Troponin T: <0.010 [LA]   1149 D-Dimer, Quant(!!): 1.14 [LA]   1355 Discussed with Dr. Omalley. Will place on Xarelto starter pack with pain control.  She has no right heart strain, no hypoxia.  Discussed with patient using ALS .  We discussed risks and benefits of this  "medication, follow-up with her primary care provider, very strict return precautions.  We also discussed her left kidney, she states she was told that she had no pain and she would not need follow-up.  She verbalized understanding and was in agreement with this plan of care. [LA]      ED Course User Index  [LA] Melisa Henderson PA-C  [PF] Tello Omalley DO KASPER reviewed by Tello Omalley DO MDM  Number of Diagnoses or Management Options  Abnormal CT scan, kidney  Single subsegmental pulmonary embolism without acute cor pulmonale (HCC)  Diagnosis management comments: On arrival, patient stable.  She saturate 98% on room air, there is no tachycardia.  Differential could include constipation, bowel obstruction, colitis, nephrolithiasis, mass or neoplasm, muscle strain or spasm, and other concerns.  Lower concern for pulmonary embolism given the fact that she has pleuritic pain, will obtain basic labs, troponin, D-dimer, lipase, urinalysis.  Depending on D-dimer, will obtain either CT of the chest abdomen pelvis or x-ray of the chest with CT of the abdomen pelvis.    Patient's labs revealed an elevated D-dimer but there is no leukocytosis, no significant electrolyte analysis.  There is no elevation of troponin.  EKG was normal by the attending.  We obtain CT abdomen pelvis as well as a chest.  CT chest revealed \"tiny right lower lobe medial basal segment artery filling  defect, consistent with acute pulmonary embolism. No evidence of right heart strain\".  On CT of the abdomen pelvis they saw they described as hydronephrosis of the left kidney and a narrowed ureter no sign of stone.  Per chart review, patient has had this before.  She states she is known about this and I told her if she was not having pain then she did not need to worry about it.  Discussed with Dr. Omalley.  Patient has not been hypoxic here, has no other lab abnormalities and no right heart strain.  We " will place her on Xarelto starter pack, give pain medication.  Discussed risks and benefits of this with patient using the ALS  and she was in agreement with this plan of care.  She states she has follow-up with her primary care provider next week.  Discussed follow-up with them, possibly cardiology if needed.  Discussed follow-up with urology for her kidney as well.  Discussed very strict return precautions.  Patient verbalized understanding and was in agreement with this plan of care.       Amount and/or Complexity of Data Reviewed  Clinical lab tests: reviewed and ordered  Tests in the radiology section of CPT®: reviewed and ordered  Discussion of test results with the performing providers: yes  Review and summarize past medical records: yes  Discuss the patient with other providers: yes    Risk of Complications, Morbidity, and/or Mortality  Presenting problems: moderate  Diagnostic procedures: moderate  Management options: low    Patient Progress  Patient progress: stable      Final diagnoses:   Single subsegmental pulmonary embolism without acute cor pulmonale (HCC)   Abnormal CT scan, kidney       ED Disposition  ED Disposition     ED Disposition   Discharge    Condition   Stable    Comment   --             Alley Finney MD  [address] [facility] [address]  [facility] [id number] [phone]    Schedule an appointment as soon as possible for a visit       Chris Burrows MD  [address] [facility] [address]  [facility] [address]  [phone]      As needed, If symptoms worsen    Molina Lakhani MD  [address] [facility] [address]  [facility] [address]  [phone]    Schedule an appointment as soon as possible for a visit   As needed, If symptoms worsen         Medication List      New Prescriptions    HYDROcodone-acetaminophen 5-325 MG per tablet  Commonly known as: NORCO  Take 1 tablet by mouth Every 6 (Six) Hours As Needed for Severe Pain  for up to 3 days.     Xarelto Starter Pack tablet therapy  pack starter pack  Generic drug: Rivaroxaban  Take one 15 mg tablet twice daily with food for 21 days.  Followed by one 20 mg tablet by mouth once daily with food. Take as directed           Where to Get Your Medications      These medications were sent to [facility] [address] [place] [address] - [address] [facility] - [phone]  - [phone]  [address] [facility] [address]    Phone: [phone]   · HYDROcodone-acetaminophen 5-325 MG per tablet  · Xarelto Starter Pack tablet therapy pack starter pack          Melisa Henderson PA-C  06/13/22 1153

## 2022-06-13 NOTE — DISCHARGE INSTRUCTIONS
You were found to have a tiny blood clot in your right lower lung which was likely causing your pain with breathing.  Take Xarelto as directed to help dissolve the clot.  You can take Norco as needed for severe pain.  Take your other home medications as directed.  You can follow-up with your primary care provider as scheduled within the next week to discuss these changes.  They may need to keep you on Xarelto for a longer amount of time but they can decide this.  You can be seen by our cardiologist Dr. Burrows if needed or be referred by her primary care provider.  They did incidentally see enlargement of your left kidney with some narrowing of your ureter which may benefit from evaluation with urology.  You can call our urologist as needed for an appointment return to the ER for any change, worsening of symptoms, or any additional concerns including but not limited not limited to shest pain, shortness of breath, dizziness, syncope.

## 2022-12-26 ENCOUNTER — APPOINTMENT (OUTPATIENT)
Dept: GENERAL RADIOLOGY | Facility: HOSPITAL | Age: 72
End: 2022-12-26

## 2022-12-26 ENCOUNTER — APPOINTMENT (OUTPATIENT)
Dept: CT IMAGING | Facility: HOSPITAL | Age: 72
End: 2022-12-26

## 2022-12-26 ENCOUNTER — HOSPITAL ENCOUNTER (EMERGENCY)
Facility: HOSPITAL | Age: 72
Discharge: HOME | End: 2022-12-27
Attending: EMERGENCY MEDICINE | Admitting: EMERGENCY MEDICINE

## 2022-12-26 DIAGNOSIS — R74.8 ELEVATED LIPASE: ICD-10-CM

## 2022-12-26 DIAGNOSIS — R11.0 NAUSEA: Primary | ICD-10-CM

## 2022-12-26 DIAGNOSIS — K85.90 ACUTE PANCREATITIS, UNSPECIFIED COMPLICATION STATUS, UNSPECIFIED PANCREATITIS TYPE: ICD-10-CM

## 2022-12-26 LAB
ALBUMIN SERPL-MCNC: 4.7 G/DL (ref 3.5–5.2)
ALBUMIN/GLOB SERPL: 1.7 G/DL
ALP SERPL-CCNC: 45 U/L (ref 39–117)
ALT SERPL W P-5'-P-CCNC: 20 U/L (ref 1–33)
ANION GAP SERPL CALCULATED.3IONS-SCNC: 15.5 MMOL/L (ref 5–15)
AST SERPL-CCNC: 30 U/L (ref 1–32)
BASOPHILS # BLD AUTO: 0.03 10*3/MM3 (ref 0–0.2)
BASOPHILS NFR BLD AUTO: 0.4 % (ref 0–1.5)
BILIRUB SERPL-MCNC: 0.2 MG/DL (ref 0–1.2)
BILIRUB UR QL STRIP: NEGATIVE
BUN SERPL-MCNC: 24 MG/DL (ref 8–23)
BUN/CREAT SERPL: 26.4 (ref 7–25)
CALCIUM SPEC-SCNC: 10.4 MG/DL (ref 8.6–10.5)
CHLORIDE SERPL-SCNC: 97 MMOL/L (ref 98–107)
CLARITY UR: CLEAR
CO2 SERPL-SCNC: 26.5 MMOL/L (ref 22–29)
COLOR UR: YELLOW
CREAT SERPL-MCNC: 0.91 MG/DL (ref 0.57–1)
DEPRECATED RDW RBC AUTO: 45.6 FL (ref 37–54)
EGFRCR SERPLBLD CKD-EPI 2021: 67.2 ML/MIN/1.73
EOSINOPHIL # BLD AUTO: 0.09 10*3/MM3 (ref 0–0.4)
EOSINOPHIL NFR BLD AUTO: 1.2 % (ref 0.3–6.2)
ERYTHROCYTE [DISTWIDTH] IN BLOOD BY AUTOMATED COUNT: 13.3 % (ref 12.3–15.4)
GLOBULIN UR ELPH-MCNC: 2.8 GM/DL
GLUCOSE SERPL-MCNC: 128 MG/DL (ref 65–99)
GLUCOSE UR STRIP-MCNC: NEGATIVE MG/DL
HCT VFR BLD AUTO: 39.7 % (ref 34–46.6)
HGB BLD-MCNC: 13.4 G/DL (ref 12–15.9)
HGB UR QL STRIP.AUTO: NEGATIVE
IMM GRANULOCYTES # BLD AUTO: 0.02 10*3/MM3 (ref 0–0.05)
IMM GRANULOCYTES NFR BLD AUTO: 0.3 % (ref 0–0.5)
KETONES UR QL STRIP: NEGATIVE
LEUKOCYTE ESTERASE UR QL STRIP.AUTO: NEGATIVE
LIPASE SERPL-CCNC: 443 U/L (ref 13–60)
LYMPHOCYTES # BLD AUTO: 2.17 10*3/MM3 (ref 0.7–3.1)
LYMPHOCYTES NFR BLD AUTO: 29.2 % (ref 19.6–45.3)
MAGNESIUM SERPL-MCNC: 1.9 MG/DL (ref 1.6–2.4)
MCH RBC QN AUTO: 31.1 PG (ref 26.6–33)
MCHC RBC AUTO-ENTMCNC: 33.8 G/DL (ref 31.5–35.7)
MCV RBC AUTO: 92.1 FL (ref 79–97)
MONOCYTES # BLD AUTO: 0.58 10*3/MM3 (ref 0.1–0.9)
MONOCYTES NFR BLD AUTO: 7.8 % (ref 5–12)
NEUTROPHILS NFR BLD AUTO: 4.54 10*3/MM3 (ref 1.7–7)
NEUTROPHILS NFR BLD AUTO: 61.1 % (ref 42.7–76)
NITRITE UR QL STRIP: NEGATIVE
NRBC BLD AUTO-RTO: 0 /100 WBC (ref 0–0.2)
PH UR STRIP.AUTO: 5.5 [PH] (ref 5–8)
PLATELET # BLD AUTO: 288 10*3/MM3 (ref 140–450)
PMV BLD AUTO: 11.7 FL (ref 6–12)
POTASSIUM SERPL-SCNC: 3.8 MMOL/L (ref 3.5–5.2)
PROCALCITONIN SERPL-MCNC: 0.05 NG/ML (ref 0–0.25)
PROT SERPL-MCNC: 7.5 G/DL (ref 6–8.5)
PROT UR QL STRIP: NEGATIVE
RBC # BLD AUTO: 4.31 10*6/MM3 (ref 3.77–5.28)
SODIUM SERPL-SCNC: 139 MMOL/L (ref 136–145)
SP GR UR STRIP: 1.01 (ref 1–1.03)
TROPONIN T SERPL-MCNC: <0.01 NG/ML (ref 0–0.03)
TROPONIN T SERPL-MCNC: <0.01 NG/ML (ref 0–0.03)
UROBILINOGEN UR QL STRIP: NORMAL
WBC NRBC COR # BLD: 7.43 10*3/MM3 (ref 3.4–10.8)

## 2022-12-26 PROCEDURE — 80053 COMPREHEN METABOLIC PANEL: CPT | Performed by: EMERGENCY MEDICINE

## 2022-12-26 PROCEDURE — 93005 ELECTROCARDIOGRAM TRACING: CPT | Performed by: EMERGENCY MEDICINE

## 2022-12-26 PROCEDURE — 74177 CT ABD & PELVIS W/CONTRAST: CPT

## 2022-12-26 PROCEDURE — 25010000002 IOPAMIDOL 61 % SOLUTION: Performed by: EMERGENCY MEDICINE

## 2022-12-26 PROCEDURE — 71045 X-RAY EXAM CHEST 1 VIEW: CPT

## 2022-12-26 PROCEDURE — 83735 ASSAY OF MAGNESIUM: CPT | Performed by: EMERGENCY MEDICINE

## 2022-12-26 PROCEDURE — 84145 PROCALCITONIN (PCT): CPT | Performed by: EMERGENCY MEDICINE

## 2022-12-26 PROCEDURE — 85025 COMPLETE CBC W/AUTO DIFF WBC: CPT | Performed by: EMERGENCY MEDICINE

## 2022-12-26 PROCEDURE — 96374 THER/PROPH/DIAG INJ IV PUSH: CPT

## 2022-12-26 PROCEDURE — 36415 COLL VENOUS BLD VENIPUNCTURE: CPT

## 2022-12-26 PROCEDURE — 25010000002 ONDANSETRON PER 1 MG: Performed by: EMERGENCY MEDICINE

## 2022-12-26 PROCEDURE — 83690 ASSAY OF LIPASE: CPT | Performed by: EMERGENCY MEDICINE

## 2022-12-26 PROCEDURE — 81003 URINALYSIS AUTO W/O SCOPE: CPT | Performed by: EMERGENCY MEDICINE

## 2022-12-26 PROCEDURE — 84484 ASSAY OF TROPONIN QUANT: CPT | Performed by: EMERGENCY MEDICINE

## 2022-12-26 PROCEDURE — 99284 EMERGENCY DEPT VISIT MOD MDM: CPT

## 2022-12-26 RX ORDER — ONDANSETRON 2 MG/ML
4 INJECTION INTRAMUSCULAR; INTRAVENOUS ONCE
Status: COMPLETED | OUTPATIENT
Start: 2022-12-26 | End: 2022-12-26

## 2022-12-26 RX ADMIN — ONDANSETRON 4 MG: 2 INJECTION INTRAMUSCULAR; INTRAVENOUS at 20:08

## 2022-12-26 RX ADMIN — IOPAMIDOL 100 ML: 612 INJECTION, SOLUTION INTRAVENOUS at 22:45

## 2022-12-26 RX ADMIN — SODIUM CHLORIDE 500 ML: 9 INJECTION, SOLUTION INTRAVENOUS at 20:09

## 2022-12-27 VITALS
WEIGHT: 170 LBS | TEMPERATURE: 98 F | HEIGHT: 63 IN | BODY MASS INDEX: 30.12 KG/M2 | SYSTOLIC BLOOD PRESSURE: 164 MMHG | RESPIRATION RATE: 16 BRPM | DIASTOLIC BLOOD PRESSURE: 91 MMHG | OXYGEN SATURATION: 96 % | HEART RATE: 65 BPM

## 2022-12-27 RX ORDER — HYDROCODONE BITARTRATE AND ACETAMINOPHEN 5; 325 MG/1; MG/1
1 TABLET ORAL EVERY 6 HOURS PRN
Qty: 10 TABLET | Refills: 0 | Status: SHIPPED | OUTPATIENT
Start: 2022-12-27

## 2022-12-27 RX ORDER — ONDANSETRON 4 MG/1
4 TABLET, ORALLY DISINTEGRATING ORAL EVERY 8 HOURS PRN
Qty: 12 TABLET | Refills: 0 | Status: SHIPPED | OUTPATIENT
Start: 2022-12-27

## 2022-12-27 NOTE — ED PROVIDER NOTES
"TRIAGE CHIEF COMPLAINT:     Nursing and triage notes reviewed    Chief Complaint   Patient presents with   • Nausea     Blood sugar 114mg/dl       HPI: Taya Lara is a 72 y.o. female who presents to the emergency department complaining of nausea, diaphoresis, shakiness.  Patient states she was eating dinner when her symptoms began around 4 5, this was a few hours prior to arrival.  She states the sweatiness has improved but she still feels somewhat nauseated and feels a little shaky in her hands.  She denied having any abdominal or chest discomfort.  She denies any lightheadedness, dizziness, chest pain, or shortness of breath.  She denies fevers or chills.  She states she checked her blood sugar at home and that was within the normal range.    REVIEW OF SYSTEMS: All other systems reviewed and are negative     PAST MEDICAL HISTORY:   Past Medical History:   Diagnosis Date   • Arthritis    • Cataract     unsure which eye, mild   • Deaf    • Diabetes mellitus (HCC)     doesnt check sugar often    • Disease of thyroid gland    • GERD (gastroesophageal reflux disease)    • Heartburn     occasionally   • History of kidney stones    • History of transfusion     FFP   2019    • Hyperlipidemia    • Hypertension    • Kidney disorder     saw doctor and states dr said (left?) kidney swollen but pt had no s/s- following up with nephro to check out later    • NICM (nonischemic cardiomyopathy) (HCC)    • Psoriasis     \"vaginal area\"   • Psoriatic arthritis (HCC)    • Wears dentures     upper    • Wears glasses         FAMILY HISTORY:   History reviewed. No pertinent family history.     SOCIAL HISTORY:   Social History     Socioeconomic History   • Marital status: Single   Tobacco Use   • Smoking status: Former     Packs/day: 1.00     Years: 19.00     Pack years: 19.00     Types: Cigarettes     Quit date:      Years since quittin.9   • Smokeless tobacco: Never   Substance and Sexual Activity   • Alcohol use: No   • " Drug use: No   • Sexual activity: Defer        SURGICAL HISTORY:   Past Surgical History:   Procedure Laterality Date   • CARDIAC DEFIBRILLATOR PLACEMENT     • CHOLECYSTECTOMY     • COLONOSCOPY  2019   • ENDOSCOPY     • TOTAL SHOULDER ARTHROPLASTY Left 2/24/2020    Procedure: TOTAL SHOULDER ARTHROPLASTY REVERSE LEFT;  Surgeon: Carl Perez MD;  Location: [facility] OR;  Service: Orthopedics;  Laterality: Left;   • TOTAL SHOULDER ARTHROPLASTY W/ DISTAL CLAVICLE EXCISION Right 1/21/2019    Procedure: TOTAL SHOULDER REVERSE ARTHROPLASTY WITH BICEP TENODESIS RIGHT;  Surgeon: Carl Perez MD;  Location: [facility] OR;  Service: Orthopedics   • TUBAL ABDOMINAL LIGATION          CURRENT MEDICATIONS:      Medication List      ASK your doctor about these medications    acetaminophen 650 MG 8 hr tablet  Commonly known as: TYLENOL     amLODIPine 10 MG tablet  Commonly known as: NORVASC     aspirin 81 MG EC tablet     betamethasone dipropionate 0.05 % lotion     bisacodyl 5 MG EC tablet  Commonly known as: DULCOLAX  Take 2 tablets by mouth Daily As Needed for Constipation.     busPIRone 10 MG tablet  Commonly known as: BUSPAR     carvedilol 25 MG tablet  Commonly known as: COREG     digoxin 125 MCG tablet  Commonly known as: LANOXIN     docusate sodium 100 MG capsule  Take 100 mg by mouth 2 (Two) Times a Day As Needed for Constipation.     fenofibrate 145 MG tablet  Commonly known as: TRICOR     fish oil 1000 MG capsule capsule     furosemide 40 MG tablet  Commonly known as: LASIX     hydrocortisone 2.5 % cream     levothyroxine 125 MCG tablet  Commonly known as: SYNTHROID, LEVOTHROID     magnesium hydroxide 2400 MG/10ML suspension suspension  Commonly known as: MILK OF MAGNESIA  Take 10 mL by mouth Daily As Needed (constipation).     metFORMIN 500 MG tablet  Commonly known as: GLUCOPHAGE     multivitamin with minerals tablet tablet     pravastatin 80 MG tablet  Commonly known as: PRAVACHOL     promethazine 25 MG  tablet  Commonly known as: PHENERGAN  Take 1 tablet by mouth Every 6 (Six) Hours As Needed for Nausea.     STELARA SC     venlafaxine  MG 24 hr capsule  Commonly known as: EFFEXOR-XR     Vitamin D3 25 MCG (1000 UT) capsule     Xarelto Starter Pack tablet therapy pack starter pack  Generic drug: Rivaroxaban  Take one 15 mg tablet twice daily with food for 21 days.  Followed by one 20 mg tablet by mouth once daily with food. Take as directed             ALLERGIES: Lisinopril and Entresto [sacubitril-valsartan]     PHYSICAL EXAM:   VITAL SIGNS:   Vitals:    12/26/22 1924   BP: 174/91   Pulse:    Resp:    Temp: 98.2 °F (36.8 °C)   SpO2:       CONSTITUTIONAL: Awake, oriented, appears nontoxic   HENT: Atraumatic, normocephalic, oral mucosa pink and moist, airway patent. Nares patent without drainage. External ears normal.   EYES: Conjunctivae clear   NECK: Trachea midline   CARDIOVASCULAR: Normal heart rate, Normal rhythm, No murmurs, rubs, gallops   PULMONARY/CHEST: Clear to auscultation, no rhonchi, wheezes, or rales. Symmetrical breath sounds   ABDOMINAL: Nondistended, soft, nontender - no rebound or guarding.  NEUROLOGIC: Nonfocal, moving all four extremities, no gross sensory or motor deficits.   EXTREMITIES: No clubbing, cyanosis, or edema   SKIN: Warm, Dry, No erythema, No rash     ED COURSE / MEDICAL DECISION MAKING:   Taya Lara is a 72 y.o. female who presents to the emergency department for evaluation of nausea and shakiness.  Patient is nondistressed on arrival in the emergency department.  Vital signs are stable on arrival.  Patient is resting comfortably and physical examination is largely unremarkable.    Differential diagnosis includes ACS, near syncope, gastritis, hypoglycemia among other etiologies.    An EKG, laboratory testing, and chest x-ray was ordered for further evaluation of the patient's presentation.    Chart review including any outside testing was reviewed including previous ER  visit, previous EKG, and most recent admission to an outside facility in 2020.    Patient was treated with fluids and Zofran initially on arrival.    EKG interpreted by me reveals an electronic ventricular pacemaker with rate of 71 bpm.  EKG unchanged when compared to previous.  This is an atypical appearing EKG.    Laboratory testing did reveal a mildly elevated lipase at 443.  Cardiac enzymes, urinalysis, white blood cell count, procalcitonin, electrolytes were within the normal range.  Patient had normal bilirubin and liver enzymes as well.  Repeat cardiac enzymes within the normal range.  Chest x-ray per my interpretation did not reveal any obvious acute process.  CT scan of the abdomen and pelvis per radiology interpretation revealed evidence of likely chronic left-sided UPJ obstruction but no other acute process.  I discussed all these findings with the patient who expressed understanding.  I suspect patient might have a mild pancreatitis given her lipase.  Patient improved after nausea medicine in the emergency department.  Given her improvement on repeat examination I think she is stable for trial of outpatient therapy.  Will provide symptomatic management with outpatient follow-up and strict return precautions.      DECISION TO DISCHARGE/ADMIT: see ED care timeline     FINAL IMPRESSION:   1 --nausea  2 --pancreatitis  3 --elevated lipase    Electronically signed by: Yesenia Randolph MD, 12/26/2022 19:52 Yesenia Quispe MD  12/27/22 0121

## 2023-01-09 ENCOUNTER — OFFICE VISIT (OUTPATIENT)
Dept: UROLOGY | Facility: CLINIC | Age: 73
End: 2023-01-09
Payer: MEDICARE

## 2023-01-09 ENCOUNTER — PATIENT ROUNDING (BHMG ONLY) (OUTPATIENT)
Dept: UROLOGY | Facility: CLINIC | Age: 73
End: 2023-01-09
Payer: MEDICARE

## 2023-01-09 VITALS
DIASTOLIC BLOOD PRESSURE: 84 MMHG | SYSTOLIC BLOOD PRESSURE: 132 MMHG | OXYGEN SATURATION: 95 % | HEART RATE: 61 BPM | BODY MASS INDEX: 29.06 KG/M2 | WEIGHT: 164 LBS | TEMPERATURE: 97.6 F | HEIGHT: 63 IN

## 2023-01-09 DIAGNOSIS — N13.5 UPJ OBSTRUCTION, ACQUIRED: Primary | ICD-10-CM

## 2023-01-09 PROCEDURE — 99203 OFFICE O/P NEW LOW 30 MIN: CPT | Performed by: UROLOGY

## 2023-01-09 NOTE — PROGRESS NOTES
Chief Complaint  Chief Complaint   Patient presents with   • Hydronephrosis     New patient and enlarged kidney       Referring Provider:  Alley Finney MD    HPI  Ms. Lara is a 72 y.o. female with below past medical history who presents with likely UPJO.    She denies incontinence.     She had ESWL many years ago, and thinks her kidney was draining well prior to ESWL, but is unsure. She was told she had an enlarged kidney in the past by Dr. Henderson.  No flank pain.  No recurrent urinary infections.    Past Medical History  Past Medical History:   Diagnosis Date   • Arthritis    • Cataract     unsure which eye, mild   • Deaf    • Diabetes mellitus (HCC)     doesnt check sugar often    • Disease of thyroid gland    • GERD (gastroesophageal reflux disease)    • Heartburn     occasionally   • History of kidney stones    • History of transfusion     FFP   2019    • Hyperlipidemia    • Hypertension    • Kidney disorder     saw doctor and states dr said (left?) kidney swollen but pt had no s/s- following up with nephro to check out later    • NICM (nonischemic cardiomyopathy) ([facility])    • Psoriasis     \"vaginal area\"   • Psoriatic arthritis (HCC)    • Wears dentures     upper    • Wears glasses        Past Surgical History  Past Surgical History:   Procedure Laterality Date   • CARDIAC DEFIBRILLATOR PLACEMENT     • CHOLECYSTECTOMY     • COLONOSCOPY  2019   • ENDOSCOPY     • TOTAL SHOULDER ARTHROPLASTY Left 2/24/2020    Procedure: TOTAL SHOULDER ARTHROPLASTY REVERSE LEFT;  Surgeon: Carl Perez MD;  Location:  [facility];  Service: Orthopedics;  Laterality: Left;   • TOTAL SHOULDER ARTHROPLASTY W/ DISTAL CLAVICLE EXCISION Right 1/21/2019    Procedure: TOTAL SHOULDER REVERSE ARTHROPLASTY WITH BICEP TENODESIS RIGHT;  Surgeon: Carl Perez MD;  Location:  [facility] OR;  Service: Orthopedics   • TUBAL ABDOMINAL LIGATION         Medications    Current Outpatient Medications:   •  acetaminophen (TYLENOL) 650 MG  8 hr tablet, Take 650 mg by mouth Every 8 (Eight) Hours As Needed for Mild Pain ., Disp: , Rfl:   •  amLODIPine (NORVASC) 10 MG tablet, Take 10 mg by mouth Daily., Disp: , Rfl:   •  aspirin 81 MG EC tablet, Take 81 mg by mouth Daily., Disp: , Rfl:   •  betamethasone dipropionate (DIPROLENE) 0.05 % lotion, Apply 1 application topically to the appropriate area as directed 2 (Two) Times a Day As Needed., Disp: , Rfl:   •  bisacodyl (DULCOLAX) 5 MG EC tablet, Take 2 tablets by mouth Daily As Needed for Constipation., Disp: , Rfl:   •  busPIRone (BUSPAR) 10 MG tablet, Take 10 mg by mouth 3 (Three) Times a Day., Disp: , Rfl:   •  carvedilol (COREG) 25 MG tablet, Take 25 mg by mouth Daily., Disp: , Rfl:   •  Cholecalciferol (VITAMIN D3) 1000 units capsule, Take 1,000 Units by mouth Daily., Disp: , Rfl:   •  digoxin (LANOXIN) 125 MCG tablet, Take 125 mcg by mouth Daily., Disp: , Rfl:   •  docusate sodium 100 MG capsule, Take 100 mg by mouth 2 (Two) Times a Day As Needed for Constipation., Disp: , Rfl:   •  fenofibrate (TRICOR) 145 MG tablet, Take 145 mg by mouth Daily., Disp: , Rfl:   •  furosemide (LASIX) 40 MG tablet, Take 40 mg by mouth Daily., Disp: , Rfl:   •  HYDROcodone-acetaminophen (NORCO) 5-325 MG per tablet, Take 1 tablet by mouth Every 6 (Six) Hours As Needed for Severe Pain., Disp: 10 tablet, Rfl: 0  •  hydrocortisone 2.5 % cream, Apply 1 application topically to the appropriate area as directed 2 (Two) Times a Day. Psoriasis, Disp: , Rfl:   •  levothyroxine (SYNTHROID, LEVOTHROID) 125 MCG tablet, Take 175 mcg by mouth Daily., Disp: , Rfl:   •  magnesium hydroxide (MILK OF MAGNESIA) 2400 MG/10ML suspension suspension, Take 10 mL by mouth Daily As Needed (constipation)., Disp: 300 mL, Rfl:   •  metFORMIN (GLUCOPHAGE) 500 MG tablet, Take 1,000 mg by mouth 2 (Two) Times a Day With Meals. 1 tablet in am and 2 tablets in evening, Disp: , Rfl:   •  Multiple Vitamins-Minerals (MULTIVITAMIN WITH MINERALS) tablet  tablet, Take 1 tablet by mouth Daily., Disp: , Rfl:   •  Omega-3 Fatty Acids (FISH OIL) 1000 MG capsule capsule, Take 1,000 mg by mouth Daily With Breakfast., Disp: , Rfl:   •  ondansetron ODT (ZOFRAN-ODT) 4 MG disintegrating tablet, Place 1 tablet on the tongue Every 8 (Eight) Hours As Needed for Nausea or Vomiting., Disp: 12 tablet, Rfl: 0  •  pravastatin (PRAVACHOL) 80 MG tablet, Take 80 mg by mouth Daily., Disp: , Rfl:   •  promethazine (PHENERGAN) 25 MG tablet, Take 1 tablet by mouth Every 6 (Six) Hours As Needed for Nausea., Disp: 20 tablet, Rfl: 0  •  Rivaroxaban (Xarelto Starter Pack) tablet therapy pack starter pack, Take one 15 mg tablet twice daily with food for 21 days.  Followed by one 20 mg tablet by mouth once daily with food. Take as directed, Disp: 51 each, Rfl: 0  •  Ustekinumab (STELARA SC), Inject  under the skin into the appropriate area as directed., Disp: , Rfl:   •  venlafaxine XR (EFFEXOR-XR) 150 MG 24 hr capsule, Take 150 mg by mouth Daily., Disp: , Rfl:     Allergies  Allergies   Allergen Reactions   • Lisinopril Angioedema   • Entresto [Sacubitril-Valsartan] Hives       Social History  Social History     Socioeconomic History   • Marital status: Single   Tobacco Use   • Smoking status: Former     Packs/day: 1.00     Years: 19.00     Pack years: 19.00     Types: Cigarettes     Quit date:      Years since quittin.0   • Smokeless tobacco: Never   Vaping Use   • Vaping Use: Never used   Substance and Sexual Activity   • Alcohol use: No   • Drug use: No   • Sexual activity: Defer         Physical Exam  Visit Vitals  /84 (BP Location: Left arm, Patient Position: Sitting, Cuff Size: Adult)   Pulse 61   Temp 97.6 °F (36.4 °C) (Temporal)   Ht 160 cm (63\")   Wt 74.4 kg (164 lb)   SpO2 95%   BMI 29.05 kg/m²     Physical exam was notable for normal habitus.    Labs  Brief Urine Lab Results  (Last result in the past 365 days)      Color   Clarity   Blood   Leuk Est   Nitrite   Protein    CREAT   Urine HCG        12/26/22 2118 Yellow   Clear   Negative   Negative   Negative   Negative                      Lab Results   Component Value Date    GLUCOSE 128 (H) 12/26/2022    CALCIUM 10.4 12/26/2022     12/26/2022    K 3.8 12/26/2022    CO2 26.5 12/26/2022    CL 97 (L) 12/26/2022    BUN 24 (H) 12/26/2022    CREATININE 0.91 12/26/2022    EGFRIFNONA 64 04/20/2021    BCR 26.4 (H) 12/26/2022    ANIONGAP 15.5 (H) 12/26/2022       Lab Results   Component Value Date    WBC 7.43 12/26/2022    HGB 13.4 12/26/2022    HCT 39.7 12/26/2022    MCV 92.1 12/26/2022     12/26/2022       Radiographic Studies  CT Abdomen Pelvis With Contrast  Result Date: 12/26/2022  Moderate colonic stool burden, correlate with symptoms of constipation.  Probable chronic left ureteropelvic junction obstruction. Authenticated and Electronically Signed by Royce Perez MD on 12/26/2022 11:41:40 PM    I personally reviewed her labs and imaging.    Assessment  Ms. Lara is a 72 y.o. female with likely acquired asymptomatic chronic left UPJ obstruction, chronic left hydronephrosis, who was recently seen in the ER for pancreatitis, where this chronic obstruction of her left kidney was incidentally identified.  Overall renal function is normal.  She does not have any flank pain.  I told her that her left kidney likely functions about 10%.    After discussion of the risks and benefits, she does not desire any intervention for her left kidney, which has not caused her any problem in 70 years.    Plan  1.  Follow-up as needed.  I told her that with time her kidney would likely atrophy with time from being obstructed     I spent a total of 30 minutes with the patient and the chart engaging in data gathering and interpretation, patient interaction, as well as counseling on the risks, benefits, and alternatives of the therapy and coordinating care.         Molina Lakhani MD

## 2023-01-09 NOTE — LETTER
January 9, 2023     Alley Finney MD  [address] [address]  [address]  [place] [place] [address]    Patient: Taya Lara   YOB: 1950   Date of Visit: 1/9/2023     Dear Dr. Reg MD:    Thank you for referring Taya Lara to me for evaluation. Below are the relevant portions of my assessment and plan of care.    If you have questions, please do not hesitate to call me. I look forward to following Taya along with you.         Sincerely,        Molina Lakhani MD        CC: No Recipients    Progress Notes:  Chief Complaint  Chief Complaint   Patient presents with   • Hydronephrosis     New patient and enlarged kidney       Referring Provider:  Alley Finney MD    HPI  Ms. Lara is a 72 y.o. female with below past medical history who presents with likely UPJO.    She denies incontinence.     She had ESWL many years ago, and thinks her kidney was draining well prior to ESWL, but is unsure. She was told she had an enlarged kidney in the past by Dr. Henderson.  No flank pain.  No recurrent urinary infections.    Past Medical History  Past Medical History:   Diagnosis Date   • Arthritis    • Cataract     unsure which eye, mild   • Deaf    • Diabetes mellitus (HCC)     doesnt check sugar often    • Disease of thyroid gland    • GERD (gastroesophageal reflux disease)    • Heartburn     occasionally   • History of kidney stones    • History of transfusion     FFP   2019    • Hyperlipidemia    • Hypertension    • Kidney disorder     saw doctor and states dr said (left?) kidney swollen but pt had no s/s- following up with nephro to check out later    • NICM (nonischemic cardiomyopathy) ([facility])    • Psoriasis     \"vaginal area\"   • Psoriatic arthritis (HCC)    • Wears dentures     upper    • Wears glasses        Past Surgical History  Past Surgical History:   Procedure Laterality Date   • CARDIAC DEFIBRILLATOR PLACEMENT     • CHOLECYSTECTOMY     • COLONOSCOPY  2019   • ENDOSCOPY     • TOTAL SHOULDER  ARTHROPLASTY Left 2/24/2020    Procedure: TOTAL SHOULDER ARTHROPLASTY REVERSE LEFT;  Surgeon: Carl Perez MD;  Location:  [facility];  Service: Orthopedics;  Laterality: Left;   • TOTAL SHOULDER ARTHROPLASTY W/ DISTAL CLAVICLE EXCISION Right 1/21/2019    Procedure: TOTAL SHOULDER REVERSE ARTHROPLASTY WITH BICEP TENODESIS RIGHT;  Surgeon: Carl Perez MD;  Location:  [facility];  Service: Orthopedics   • TUBAL ABDOMINAL LIGATION         Medications    Current Outpatient Medications:   •  acetaminophen (TYLENOL) 650 MG 8 hr tablet, Take 650 mg by mouth Every 8 (Eight) Hours As Needed for Mild Pain ., Disp: , Rfl:   •  amLODIPine (NORVASC) 10 MG tablet, Take 10 mg by mouth Daily., Disp: , Rfl:   •  aspirin 81 MG EC tablet, Take 81 mg by mouth Daily., Disp: , Rfl:   •  betamethasone dipropionate (DIPROLENE) 0.05 % lotion, Apply 1 application topically to the appropriate area as directed 2 (Two) Times a Day As Needed., Disp: , Rfl:   •  bisacodyl (DULCOLAX) 5 MG EC tablet, Take 2 tablets by mouth Daily As Needed for Constipation., Disp: , Rfl:   •  busPIRone (BUSPAR) 10 MG tablet, Take 10 mg by mouth 3 (Three) Times a Day., Disp: , Rfl:   •  carvedilol (COREG) 25 MG tablet, Take 25 mg by mouth Daily., Disp: , Rfl:   •  Cholecalciferol (VITAMIN D3) 1000 units capsule, Take 1,000 Units by mouth Daily., Disp: , Rfl:   •  digoxin (LANOXIN) 125 MCG tablet, Take 125 mcg by mouth Daily., Disp: , Rfl:   •  docusate sodium 100 MG capsule, Take 100 mg by mouth 2 (Two) Times a Day As Needed for Constipation., Disp: , Rfl:   •  fenofibrate (TRICOR) 145 MG tablet, Take 145 mg by mouth Daily., Disp: , Rfl:   •  furosemide (LASIX) 40 MG tablet, Take 40 mg by mouth Daily., Disp: , Rfl:   •  HYDROcodone-acetaminophen (NORCO) 5-325 MG per tablet, Take 1 tablet by mouth Every 6 (Six) Hours As Needed for Severe Pain., Disp: 10 tablet, Rfl: 0  •  hydrocortisone 2.5 % cream, Apply 1 application topically to the appropriate area  as directed 2 (Two) Times a Day. Psoriasis, Disp: , Rfl:   •  levothyroxine (SYNTHROID, LEVOTHROID) 125 MCG tablet, Take 175 mcg by mouth Daily., Disp: , Rfl:   •  magnesium hydroxide (MILK OF MAGNESIA) 2400 MG/10ML suspension suspension, Take 10 mL by mouth Daily As Needed (constipation)., Disp: 300 mL, Rfl:   •  metFORMIN (GLUCOPHAGE) 500 MG tablet, Take 1,000 mg by mouth 2 (Two) Times a Day With Meals. 1 tablet in am and 2 tablets in evening, Disp: , Rfl:   •  Multiple Vitamins-Minerals (MULTIVITAMIN WITH MINERALS) tablet tablet, Take 1 tablet by mouth Daily., Disp: , Rfl:   •  Omega-3 Fatty Acids (FISH OIL) 1000 MG capsule capsule, Take 1,000 mg by mouth Daily With Breakfast., Disp: , Rfl:   •  ondansetron ODT (ZOFRAN-ODT) 4 MG disintegrating tablet, Place 1 tablet on the tongue Every 8 (Eight) Hours As Needed for Nausea or Vomiting., Disp: 12 tablet, Rfl: 0  •  pravastatin (PRAVACHOL) 80 MG tablet, Take 80 mg by mouth Daily., Disp: , Rfl:   •  promethazine (PHENERGAN) 25 MG tablet, Take 1 tablet by mouth Every 6 (Six) Hours As Needed for Nausea., Disp: 20 tablet, Rfl: 0  •  Rivaroxaban (Xarelto Starter Pack) tablet therapy pack starter pack, Take one 15 mg tablet twice daily with food for 21 days.  Followed by one 20 mg tablet by mouth once daily with food. Take as directed, Disp: 51 each, Rfl: 0  •  Ustekinumab (STELARA SC), Inject  under the skin into the appropriate area as directed., Disp: , Rfl:   •  venlafaxine XR (EFFEXOR-XR) 150 MG 24 hr capsule, Take 150 mg by mouth Daily., Disp: , Rfl:     Allergies  Allergies   Allergen Reactions   • Lisinopril Angioedema   • Entresto [Sacubitril-Valsartan] Hives       Social History  Social History     Socioeconomic History   • Marital status: Single   Tobacco Use   • Smoking status: Former     Packs/day: 1.00     Years: 19.00     Pack years: 19.00     Types: Cigarettes     Quit date:      Years since quittin.0   • Smokeless tobacco: Never   Vaping Use   •  Vaping Use: Never used   Substance and Sexual Activity   • Alcohol use: No   • Drug use: No   • Sexual activity: Defer         Physical Exam  Visit Vitals  /84 (BP Location: Left arm, Patient Position: Sitting, Cuff Size: Adult)   Pulse 61   Temp 97.6 °F (36.4 °C) (Temporal)   Ht 160 cm (63\")   Wt 74.4 kg (164 lb)   SpO2 95%   BMI 29.05 kg/m²     Physical exam was notable for normal habitus.    Labs  Brief Urine Lab Results  (Last result in the past 365 days)      Color   Clarity   Blood   Leuk Est   Nitrite   Protein   CREAT   Urine HCG        12/26/22 2118 Yellow   Clear   Negative   Negative   Negative   Negative                      Lab Results   Component Value Date    GLUCOSE 128 (H) 12/26/2022    CALCIUM 10.4 12/26/2022     12/26/2022    K 3.8 12/26/2022    CO2 26.5 12/26/2022    CL 97 (L) 12/26/2022    BUN 24 (H) 12/26/2022    CREATININE 0.91 12/26/2022    EGFRIFNONA 64 04/20/2021    BCR 26.4 (H) 12/26/2022    ANIONGAP 15.5 (H) 12/26/2022       Lab Results   Component Value Date    WBC 7.43 12/26/2022    HGB 13.4 12/26/2022    HCT 39.7 12/26/2022    MCV 92.1 12/26/2022     12/26/2022       Radiographic Studies  CT Abdomen Pelvis With Contrast  Result Date: 12/26/2022  Moderate colonic stool burden, correlate with symptoms of constipation.  Probable chronic left ureteropelvic junction obstruction. Authenticated and Electronically Signed by Royce Perez MD on 12/26/2022 11:41:40 PM    I personally reviewed her labs and imaging.    Assessment  Ms. Lara is a 72 y.o. female with likely acquired asymptomatic chronic left UPJ obstruction, chronic left hydronephrosis, who was recently seen in the ER for pancreatitis, where this chronic obstruction of her left kidney was incidentally identified.  Overall renal function is normal.  She does not have any flank pain.  I told her that her left kidney likely functions about 10%.    After discussion of the risks and benefits, she does not desire any  intervention for her left kidney, which has not caused her any problem in 70 years.    Plan  1.  Follow-up as needed.  I told her that with time her kidney would likely atrophy with time from being obstructed     I spent a total of 30 minutes with the patient and the chart engaging in data gathering and interpretation, patient interaction, as well as counseling on the risks, benefits, and alternatives of the therapy and coordinating care.         Molina Lakhani MD

## 2023-01-13 NOTE — PROGRESS NOTES
January 13, 2023    Hello, may I speak with Taya Lara?    My name is Melba GOMEZ    I am  [place] [facility] [place]  [address]  [address]  [facility] [address] [phone].    Before we get started may I verify your date of birth? 1950    I am calling to officially welcome you to our practice and ask about your recent visit. Is this a good time to talk? Yes    Tell me about your visit with us. What things went well? Patient said her visit went very well and everyone was very nice.       We're always looking for ways to make our patients' experiences even better. Do you have recommendations on ways we may improve?  Patient answered no.    Overall were you satisfied with your first visit to our practice? Yes.       I appreciate you taking the time to speak with me today. Is there anything else I can do for you? No.      Thank you, and have a great day.

## 2023-05-13 ENCOUNTER — HOSPITAL ENCOUNTER (EMERGENCY)
Facility: HOSPITAL | Age: 73
Discharge: HOME | End: 2023-05-13
Attending: EMERGENCY MEDICINE
Payer: MEDICARE

## 2023-05-13 ENCOUNTER — APPOINTMENT (OUTPATIENT)
Dept: GENERAL RADIOLOGY | Facility: HOSPITAL | Age: 73
End: 2023-05-13
Payer: MEDICARE

## 2023-05-13 VITALS
SYSTOLIC BLOOD PRESSURE: 146 MMHG | TEMPERATURE: 98.2 F | OXYGEN SATURATION: 94 % | DIASTOLIC BLOOD PRESSURE: 82 MMHG | HEIGHT: 63 IN | RESPIRATION RATE: 16 BRPM | HEART RATE: 76 BPM | WEIGHT: 160 LBS | BODY MASS INDEX: 28.35 KG/M2

## 2023-05-13 DIAGNOSIS — R07.9 CHEST PAIN, UNSPECIFIED TYPE: Primary | ICD-10-CM

## 2023-05-13 LAB
ALBUMIN SERPL-MCNC: 4 G/DL (ref 3.5–5.2)
ALBUMIN/GLOB SERPL: 1.5 G/DL
ALP SERPL-CCNC: 41 U/L (ref 39–117)
ALT SERPL W P-5'-P-CCNC: 17 U/L (ref 1–33)
ANION GAP SERPL CALCULATED.3IONS-SCNC: 11.6 MMOL/L (ref 5–15)
AST SERPL-CCNC: 24 U/L (ref 1–32)
BASOPHILS # BLD AUTO: 0.03 10*3/MM3 (ref 0–0.2)
BASOPHILS NFR BLD AUTO: 0.6 % (ref 0–1.5)
BILIRUB SERPL-MCNC: 0.3 MG/DL (ref 0–1.2)
BUN SERPL-MCNC: 18 MG/DL (ref 8–23)
BUN/CREAT SERPL: 23.4 (ref 7–25)
CALCIUM SPEC-SCNC: 9.9 MG/DL (ref 8.6–10.5)
CHLORIDE SERPL-SCNC: 104 MMOL/L (ref 98–107)
CO2 SERPL-SCNC: 24.4 MMOL/L (ref 22–29)
CREAT SERPL-MCNC: 0.77 MG/DL (ref 0.57–1)
DEPRECATED RDW RBC AUTO: 44.9 FL (ref 37–54)
DIGOXIN SERPL-MCNC: 0.5 NG/ML (ref 0.6–1.2)
EGFRCR SERPLBLD CKD-EPI 2021: 82.1 ML/MIN/1.73
EOSINOPHIL # BLD AUTO: 0.09 10*3/MM3 (ref 0–0.4)
EOSINOPHIL NFR BLD AUTO: 1.7 % (ref 0.3–6.2)
ERYTHROCYTE [DISTWIDTH] IN BLOOD BY AUTOMATED COUNT: 13.3 % (ref 12.3–15.4)
GEN 5 2HR TROPONIN T REFLEX: 11 NG/L
GLOBULIN UR ELPH-MCNC: 2.7 GM/DL
GLUCOSE BLDC GLUCOMTR-MCNC: 123 MG/DL (ref 70–130)
GLUCOSE SERPL-MCNC: 206 MG/DL (ref 65–99)
HBA1C MFR BLD: 5.9 % (ref 4.8–5.6)
HCT VFR BLD AUTO: 39.2 % (ref 34–46.6)
HGB BLD-MCNC: 13 G/DL (ref 12–15.9)
HOLD SPECIMEN: NORMAL
HOLD SPECIMEN: NORMAL
IMM GRANULOCYTES # BLD AUTO: 0.01 10*3/MM3 (ref 0–0.05)
IMM GRANULOCYTES NFR BLD AUTO: 0.2 % (ref 0–0.5)
LYMPHOCYTES # BLD AUTO: 1.6 10*3/MM3 (ref 0.7–3.1)
LYMPHOCYTES NFR BLD AUTO: 30.1 % (ref 19.6–45.3)
MCH RBC QN AUTO: 30.2 PG (ref 26.6–33)
MCHC RBC AUTO-ENTMCNC: 33.2 G/DL (ref 31.5–35.7)
MCV RBC AUTO: 91.2 FL (ref 79–97)
MONOCYTES # BLD AUTO: 0.35 10*3/MM3 (ref 0.1–0.9)
MONOCYTES NFR BLD AUTO: 6.6 % (ref 5–12)
NEUTROPHILS NFR BLD AUTO: 3.23 10*3/MM3 (ref 1.7–7)
NEUTROPHILS NFR BLD AUTO: 60.8 % (ref 42.7–76)
NRBC BLD AUTO-RTO: 0 /100 WBC (ref 0–0.2)
PLATELET # BLD AUTO: 262 10*3/MM3 (ref 140–450)
PMV BLD AUTO: 12.1 FL (ref 6–12)
POTASSIUM SERPL-SCNC: 3.6 MMOL/L (ref 3.5–5.2)
PROT SERPL-MCNC: 6.7 G/DL (ref 6–8.5)
RBC # BLD AUTO: 4.3 10*6/MM3 (ref 3.77–5.28)
SODIUM SERPL-SCNC: 140 MMOL/L (ref 136–145)
TROPONIN T DELTA: 2 NG/L
TROPONIN T SERPL HS-MCNC: 9 NG/L
WBC NRBC COR # BLD: 5.31 10*3/MM3 (ref 3.4–10.8)
WHOLE BLOOD HOLD COAG: NORMAL
WHOLE BLOOD HOLD SPECIMEN: NORMAL

## 2023-05-13 PROCEDURE — 99285 EMERGENCY DEPT VISIT HI MDM: CPT

## 2023-05-13 PROCEDURE — 80053 COMPREHEN METABOLIC PANEL: CPT

## 2023-05-13 PROCEDURE — 84484 ASSAY OF TROPONIN QUANT: CPT

## 2023-05-13 PROCEDURE — 85025 COMPLETE CBC W/AUTO DIFF WBC: CPT

## 2023-05-13 PROCEDURE — 71045 X-RAY EXAM CHEST 1 VIEW: CPT

## 2023-05-13 PROCEDURE — 83036 HEMOGLOBIN GLYCOSYLATED A1C: CPT | Performed by: PHYSICIAN ASSISTANT

## 2023-05-13 PROCEDURE — 80162 ASSAY OF DIGOXIN TOTAL: CPT | Performed by: PHYSICIAN ASSISTANT

## 2023-05-13 PROCEDURE — 82948 REAGENT STRIP/BLOOD GLUCOSE: CPT

## 2023-05-13 PROCEDURE — 93005 ELECTROCARDIOGRAM TRACING: CPT

## 2023-05-13 PROCEDURE — 36415 COLL VENOUS BLD VENIPUNCTURE: CPT

## 2023-05-13 RX ORDER — ASPIRIN 81 MG/1
324 TABLET, CHEWABLE ORAL ONCE
Status: COMPLETED | OUTPATIENT
Start: 2023-05-13 | End: 2023-05-13

## 2023-05-13 RX ORDER — SODIUM CHLORIDE 0.9 % (FLUSH) 0.9 %
10 SYRINGE (ML) INJECTION AS NEEDED
Status: DISCONTINUED | OUTPATIENT
Start: 2023-05-13 | End: 2023-05-13 | Stop reason: HOSPADM

## 2023-05-13 RX ADMIN — ASPIRIN 81 MG CHEWABLE TABLET 324 MG: 81 TABLET CHEWABLE at 16:11

## 2023-05-13 NOTE — ED PROVIDER NOTES
"Subjective  History of Present Illness:    Chief Complaint: Chest pain  History of Present Illness: 72-year-old female presents with chest pain, she was home earlier, primo groceries and had chest pain and felt sweaty, she stopped doing the groceries, she had some coffee, rested, and went back to unloading groceries when she had another episode of chest pain.  She came to the emergency department after that.  No previous history of any heart attacks, no stents or bypass, she does have a pacemaker since 2011 for low heart rate.  She is not a smoker, she is diabetic, no recent travel, no fever chills  Onset: Several hours ago  Duration: Several hours  Exacerbating / Alleviating factors: Patient was unloading groceries  Associated symptoms: Felt sweaty      Nurses Notes reviewed and agree, including vitals, allergies, social history and prior medical history.     REVIEW OF SYSTEMS: All systems reviewed and not pertinent unless noted.    Review of Systems   All other systems reviewed and are negative.      Past Medical History:   Diagnosis Date   • Arthritis    • Cataract     unsure which eye, mild   • Deaf    • Diabetes mellitus     doesnt check sugar often    • Disease of thyroid gland    • GERD (gastroesophageal reflux disease)    • Heartburn     occasionally   • History of kidney stones    • History of transfusion     FFP   2019    • Hyperlipidemia    • Hypertension    • Kidney disorder     saw doctor and states dr said (left?) kidney swollen but pt had no s/s- following up with nephro to check out later    • NICM (nonischemic cardiomyopathy)    • Psoriasis     \"vaginal area\"   • Psoriatic arthritis    • Wears dentures     upper    • Wears glasses        Allergies:    Lisinopril and Entresto [sacubitril-valsartan]      Past Surgical History:   Procedure Laterality Date   • CARDIAC DEFIBRILLATOR PLACEMENT     • CHOLECYSTECTOMY     • COLONOSCOPY  2019   • ENDOSCOPY     • TOTAL SHOULDER ARTHROPLASTY Left 2/24/2020    " "Procedure: TOTAL SHOULDER ARTHROPLASTY REVERSE LEFT;  Surgeon: Carl Perez MD;  Location: [facility] OR;  Service: Orthopedics;  Laterality: Left;   • TOTAL SHOULDER ARTHROPLASTY W/ DISTAL CLAVICLE EXCISION Right 2019    Procedure: TOTAL SHOULDER REVERSE ARTHROPLASTY WITH BICEP TENODESIS RIGHT;  Surgeon: Carl Perez MD;  Location: [facility] OR;  Service: Orthopedics   • TUBAL ABDOMINAL LIGATION           Social History     Socioeconomic History   • Marital status: Single   Tobacco Use   • Smoking status: Former     Packs/day: 1.00     Years: 19.00     Pack years: 19.00     Types: Cigarettes     Quit date:      Years since quittin.3   • Smokeless tobacco: Never   Vaping Use   • Vaping Use: Never used   Substance and Sexual Activity   • Alcohol use: No   • Drug use: No   • Sexual activity: Defer         History reviewed. No pertinent family history.    Objective  Physical Exam:  /82   Pulse 73   Temp 98.2 °F (36.8 °C) (Oral)   Resp 18   Ht 160 cm (63\")   Wt 72.6 kg (160 lb)   SpO2 94%   BMI 28.34 kg/m²      Physical Exam  Vitals and nursing note reviewed.   Constitutional:       Appearance: She is well-developed.   Cardiovascular:      Rate and Rhythm: Normal rate and regular rhythm.   Pulmonary:      Effort: Pulmonary effort is normal.      Breath sounds: Normal breath sounds.   Abdominal:      General: Bowel sounds are normal.      Palpations: Abdomen is soft.   Musculoskeletal:         General: Normal range of motion.      Cervical back: Normal range of motion and neck supple.   Skin:     General: Skin is warm and dry.   Neurological:      Mental Status: She is alert and oriented to person, place, and time.      Deep Tendon Reflexes: Reflexes are normal and symmetric.           Procedures    ED Course:    ED Course as of 23 193   Sat May 13, 2023   1820 EKG interpreted by me.  Ventricularly paced rhythm.  Abnormal EKG.  Rate of 75 [CG]      ED Course User Index  [CG] " Jett Alejo DO       Lab Results (last 24 hours)     Procedure Component Value Units Date/Time    CBC & Differential [[id number]]  (Abnormal) Collected: 05/13/23 1604    Specimen: Blood Updated: 05/13/23 1614    Narrative:      The following orders were created for panel order CBC & Differential.  Procedure                               Abnormality         Status                     ---------                               -----------         ------                     CBC Auto Differential[[id number]]        Abnormal            Final result                 Please view results for these tests on the individual orders.    Comprehensive Metabolic Panel [[id number]]  (Abnormal) Collected: 05/13/23 1604    Specimen: Blood Updated: 05/13/23 1636     Glucose 206 mg/dL      Comment: Glucose >180, Hemoglobin A1C recommended.        BUN 18 mg/dL      Creatinine 0.77 mg/dL      Sodium 140 mmol/L      Potassium 3.6 mmol/L      Chloride 104 mmol/L      CO2 24.4 mmol/L      Calcium 9.9 mg/dL      Total Protein 6.7 g/dL      Albumin 4.0 g/dL      ALT (SGPT) 17 U/L      AST (SGOT) 24 U/L      Alkaline Phosphatase 41 U/L      Total Bilirubin 0.3 mg/dL      Globulin 2.7 gm/dL      A/G Ratio 1.5 g/dL      BUN/Creatinine Ratio 23.4     Anion Gap 11.6 mmol/L      eGFR 82.1 mL/min/1.73     Narrative:      GFR Normal >60  Chronic Kidney Disease <60  Kidney Failure <15    The GFR formula is only valid for adults with stable renal function between ages 18 and 70.    High Sensitivity Troponin T [[id number]]  (Normal) Collected: 05/13/23 1604    Specimen: Blood Updated: 05/13/23 1638     HS Troponin T 9 ng/L     Narrative:      High Sensitive Troponin T Reference Range:  <10.0 ng/L- Negative Female for AMI  <15.0 ng/L- Negative Male for AMI  >=10 - Abnormal Female indicating possible myocardial injury.  >=15 - Abnormal Male indicating possible myocardial injury.   Clinicians would have to utilize clinical acumen, EKG, Troponin, and serial  changes to determine if it is an Acute Myocardial Infarction or myocardial injury due to an underlying chronic condition.         CBC Auto Differential [[id number]]  (Abnormal) Collected: 05/13/23 1604    Specimen: Blood Updated: 05/13/23 1614     WBC 5.31 10*3/mm3      RBC 4.30 10*6/mm3      Hemoglobin 13.0 g/dL      Hematocrit 39.2 %      MCV 91.2 fL      MCH 30.2 pg      MCHC 33.2 g/dL      RDW 13.3 %      RDW-SD 44.9 fl      MPV 12.1 fL      Platelets 262 10*3/mm3      Neutrophil % 60.8 %      Lymphocyte % 30.1 %      Monocyte % 6.6 %      Eosinophil % 1.7 %      Basophil % 0.6 %      Immature Grans % 0.2 %      Neutrophils, Absolute 3.23 10*3/mm3      Lymphocytes, Absolute 1.60 10*3/mm3      Monocytes, Absolute 0.35 10*3/mm3      Eosinophils, Absolute 0.09 10*3/mm3      Basophils, Absolute 0.03 10*3/mm3      Immature Grans, Absolute 0.01 10*3/mm3      nRBC 0.0 /100 WBC     Hemoglobin A1c [[id number]]  (Abnormal) Collected: 05/13/23 1604    Specimen: Blood Updated: 05/13/23 1647     Hemoglobin A1C 5.90 %     Narrative:      Hemoglobin A1C Ranges:    Increased Risk for Diabetes  5.7% to 6.4%  Diabetes                     >= 6.5%  Diabetic Goal                < 7.0%    Digoxin Level [[id number]]  (Abnormal) Collected: 05/13/23 1604    Specimen: Blood Updated: 05/13/23 1649     Digoxin 0.50 ng/mL     POC Glucose Once [[id number]]  (Normal) Collected: 05/13/23 1652    Specimen: Blood Updated: 05/13/23 1656     Glucose 123 mg/dL      Comment: Serial Number: [id number]:  [id number]       High Sensitivity Troponin T 2Hr [[id number]]  (Abnormal) Collected: 05/13/23 1831    Specimen: Blood Updated: 05/13/23 1858     HS Troponin T 11 ng/L      Troponin T Delta 2 ng/L     Narrative:      High Sensitive Troponin T Reference Range:  <10.0 ng/L- Negative Female for AMI  <15.0 ng/L- Negative Male for AMI  >=10 - Abnormal Female indicating possible myocardial injury.  >=15 - Abnormal Male indicating possible myocardial injury.    Clinicians would have to utilize clinical acumen, EKG, Troponin, and serial changes to determine if it is an Acute Myocardial Infarction or myocardial injury due to an underlying chronic condition.                No radiology results from the last 24 hrs       Medical Decision Making  Differential would include acute MI, chest wall pain, reflux disease, panic disorder anxiety, pericarditis, pneumonia, heart failure, PE, acute dissection.  No pain on my evaluation, an IV was established, 2 sets of troponins were done, with the change of 2, heart score 6, moderate risk, discussed results with the patient at the bedside, she is currently having no pain, she understands that she has risk factors, she would like to follow-up as an outpatient, she does have someone to care for at home.  She understands that if symptoms gets worse she will come back, and she will call her cardiologist in a few days.  Shared decision making was discussed    Chest pain, unspecified type: acute illness or injury  Amount and/or Complexity of Data Reviewed  Labs: ordered. Decision-making details documented in ED Course.  Radiology: independent interpretation performed. Decision-making details documented in ED Course.  ECG/medicine tests:  Decision-making details documented in ED Course.            Final diagnoses:   Chest pain, unspecified type        Kirit Ogden Jr., PA-C  05/13/23 1939

## 2023-09-16 ENCOUNTER — APPOINTMENT (OUTPATIENT)
Dept: GENERAL RADIOLOGY | Facility: HOSPITAL | Age: 73
End: 2023-09-16
Payer: MEDICARE

## 2023-09-16 ENCOUNTER — HOSPITAL ENCOUNTER (EMERGENCY)
Facility: HOSPITAL | Age: 73
Discharge: HOME | End: 2023-09-16
Attending: EMERGENCY MEDICINE
Payer: MEDICARE

## 2023-09-16 VITALS
HEART RATE: 66 BPM | SYSTOLIC BLOOD PRESSURE: 163 MMHG | TEMPERATURE: 97.7 F | HEIGHT: 63 IN | WEIGHT: 160 LBS | OXYGEN SATURATION: 93 % | RESPIRATION RATE: 18 BRPM | BODY MASS INDEX: 28.35 KG/M2 | DIASTOLIC BLOOD PRESSURE: 97 MMHG

## 2023-09-16 DIAGNOSIS — M79.601 RIGHT ARM PAIN: Primary | ICD-10-CM

## 2023-09-16 LAB
ALBUMIN SERPL-MCNC: 4.4 G/DL (ref 3.5–5.2)
ALBUMIN/GLOB SERPL: 1.6 G/DL
ALP SERPL-CCNC: 43 U/L (ref 39–117)
ALT SERPL W P-5'-P-CCNC: 15 U/L (ref 1–33)
ANION GAP SERPL CALCULATED.3IONS-SCNC: 11 MMOL/L (ref 5–15)
AST SERPL-CCNC: 20 U/L (ref 1–32)
BASOPHILS # BLD AUTO: 0.05 10*3/MM3 (ref 0–0.2)
BASOPHILS NFR BLD AUTO: 0.6 % (ref 0–1.5)
BILIRUB SERPL-MCNC: 0.3 MG/DL (ref 0–1.2)
BUN SERPL-MCNC: 16 MG/DL (ref 8–23)
BUN/CREAT SERPL: 22.2 (ref 7–25)
CALCIUM SPEC-SCNC: 9.6 MG/DL (ref 8.6–10.5)
CHLORIDE SERPL-SCNC: 107 MMOL/L (ref 98–107)
CO2 SERPL-SCNC: 26 MMOL/L (ref 22–29)
CREAT SERPL-MCNC: 0.72 MG/DL (ref 0.57–1)
DEPRECATED RDW RBC AUTO: 45.6 FL (ref 37–54)
DIGOXIN SERPL-MCNC: 0.71 NG/ML (ref 0.6–1.2)
EGFRCR SERPLBLD CKD-EPI 2021: 89 ML/MIN/1.73
EOSINOPHIL # BLD AUTO: 0.17 10*3/MM3 (ref 0–0.4)
EOSINOPHIL NFR BLD AUTO: 2 % (ref 0.3–6.2)
ERYTHROCYTE [DISTWIDTH] IN BLOOD BY AUTOMATED COUNT: 13.3 % (ref 12.3–15.4)
GEN 5 2HR TROPONIN T REFLEX: 12 NG/L
GLOBULIN UR ELPH-MCNC: 2.8 GM/DL
GLUCOSE SERPL-MCNC: 138 MG/DL (ref 65–99)
HCT VFR BLD AUTO: 40 % (ref 34–46.6)
HGB BLD-MCNC: 13.3 G/DL (ref 12–15.9)
HOLD SPECIMEN: NORMAL
HOLD SPECIMEN: NORMAL
IMM GRANULOCYTES # BLD AUTO: 0.01 10*3/MM3 (ref 0–0.05)
IMM GRANULOCYTES NFR BLD AUTO: 0.1 % (ref 0–0.5)
LYMPHOCYTES # BLD AUTO: 2.61 10*3/MM3 (ref 0.7–3.1)
LYMPHOCYTES NFR BLD AUTO: 31.1 % (ref 19.6–45.3)
MCH RBC QN AUTO: 31.1 PG (ref 26.6–33)
MCHC RBC AUTO-ENTMCNC: 33.3 G/DL (ref 31.5–35.7)
MCV RBC AUTO: 93.5 FL (ref 79–97)
MONOCYTES # BLD AUTO: 0.64 10*3/MM3 (ref 0.1–0.9)
MONOCYTES NFR BLD AUTO: 7.6 % (ref 5–12)
NEUTROPHILS NFR BLD AUTO: 4.91 10*3/MM3 (ref 1.7–7)
NEUTROPHILS NFR BLD AUTO: 58.6 % (ref 42.7–76)
NRBC BLD AUTO-RTO: 0 /100 WBC (ref 0–0.2)
PLATELET # BLD AUTO: 295 10*3/MM3 (ref 140–450)
PMV BLD AUTO: 11.3 FL (ref 6–12)
POTASSIUM SERPL-SCNC: 4.2 MMOL/L (ref 3.5–5.2)
PROT SERPL-MCNC: 7.2 G/DL (ref 6–8.5)
RBC # BLD AUTO: 4.28 10*6/MM3 (ref 3.77–5.28)
SODIUM SERPL-SCNC: 144 MMOL/L (ref 136–145)
TROPONIN T DELTA: 1 NG/L
TROPONIN T SERPL HS-MCNC: 11 NG/L
WBC NRBC COR # BLD: 8.39 10*3/MM3 (ref 3.4–10.8)
WHOLE BLOOD HOLD COAG: NORMAL
WHOLE BLOOD HOLD SPECIMEN: NORMAL

## 2023-09-16 PROCEDURE — 25010000002 DEXAMETHASONE SODIUM PHOSPHATE 10 MG/ML SOLUTION: Performed by: EMERGENCY MEDICINE

## 2023-09-16 PROCEDURE — 71045 X-RAY EXAM CHEST 1 VIEW: CPT

## 2023-09-16 PROCEDURE — 93005 ELECTROCARDIOGRAM TRACING: CPT | Performed by: EMERGENCY MEDICINE

## 2023-09-16 PROCEDURE — 80162 ASSAY OF DIGOXIN TOTAL: CPT | Performed by: EMERGENCY MEDICINE

## 2023-09-16 PROCEDURE — 99284 EMERGENCY DEPT VISIT MOD MDM: CPT

## 2023-09-16 PROCEDURE — 85025 COMPLETE CBC W/AUTO DIFF WBC: CPT | Performed by: EMERGENCY MEDICINE

## 2023-09-16 PROCEDURE — 96374 THER/PROPH/DIAG INJ IV PUSH: CPT

## 2023-09-16 PROCEDURE — 36415 COLL VENOUS BLD VENIPUNCTURE: CPT

## 2023-09-16 PROCEDURE — 84484 ASSAY OF TROPONIN QUANT: CPT | Performed by: EMERGENCY MEDICINE

## 2023-09-16 PROCEDURE — 96375 TX/PRO/DX INJ NEW DRUG ADDON: CPT

## 2023-09-16 PROCEDURE — 25010000002 ORPHENADRINE CITRATE PER 60 MG: Performed by: EMERGENCY MEDICINE

## 2023-09-16 PROCEDURE — 80053 COMPREHEN METABOLIC PANEL: CPT | Performed by: EMERGENCY MEDICINE

## 2023-09-16 RX ORDER — DEXAMETHASONE SODIUM PHOSPHATE 10 MG/ML
10 INJECTION, SOLUTION INTRAMUSCULAR; INTRAVENOUS ONCE
Status: COMPLETED | OUTPATIENT
Start: 2023-09-16 | End: 2023-09-16

## 2023-09-16 RX ORDER — SODIUM CHLORIDE 0.9 % (FLUSH) 0.9 %
10 SYRINGE (ML) INJECTION AS NEEDED
Status: DISCONTINUED | OUTPATIENT
Start: 2023-09-16 | End: 2023-09-16 | Stop reason: HOSPADM

## 2023-09-16 RX ORDER — ORPHENADRINE CITRATE 100 MG/1
100 TABLET, EXTENDED RELEASE ORAL 2 TIMES DAILY
Qty: 20 TABLET | Refills: 0 | Status: SHIPPED | OUTPATIENT
Start: 2023-09-16

## 2023-09-16 RX ORDER — ORPHENADRINE CITRATE 30 MG/ML
60 INJECTION INTRAMUSCULAR; INTRAVENOUS ONCE
Status: COMPLETED | OUTPATIENT
Start: 2023-09-16 | End: 2023-09-16

## 2023-09-16 RX ADMIN — ORPHENADRINE CITRATE 60 MG: 60 INJECTION INTRAMUSCULAR; INTRAVENOUS at 08:29

## 2023-09-16 RX ADMIN — DEXAMETHASONE SODIUM PHOSPHATE 10 MG: 10 INJECTION INTRAMUSCULAR; INTRAVENOUS at 08:29

## 2023-09-16 NOTE — ED PROVIDER NOTES
"  HPI: Taya Lara is a 72 y.o. female who presents to the emergency department complaining of arm pain, chest pain.  History obtained via .  Patient states that last night she woke up several times with pain in her right shoulder and right arm.  Pain is described as \"shooting\".  Pain occasionally went into the jaw and chest.  There are no particular alleviating or aggravating factors.  She denies any fevers, chills, cough, nausea, vomiting or other complaints.  Patient was seen here recently for chest pain and had reassuring work-up.      REVIEW OF SYSTEMS: All other systems reviewed and are negative     PAST MEDICAL HISTORY:   Past Medical History:   Diagnosis Date    Arthritis     Cataract     unsure which eye, mild    Deaf     Diabetes mellitus     doesnt check sugar often     Disease of thyroid gland     GERD (gastroesophageal reflux disease)     Heartburn     occasionally    History of kidney stones     History of transfusion     FFP   2019     Hyperlipidemia     Hypertension     Kidney disorder     saw doctor and states dr said (left?) kidney swollen but pt had no s/s- following up with nephro to check out later     NICM (nonischemic cardiomyopathy)     Psoriasis     \"vaginal area\"    Psoriatic arthritis     Wears dentures     upper     Wears glasses         FAMILY HISTORY:   History reviewed. No pertinent family history.     SOCIAL HISTORY:   Social History     Socioeconomic History    Marital status: Single   Tobacco Use    Smoking status: Former     Packs/day: 1.00     Years: 19.00     Pack years: 19.00     Types: Cigarettes     Quit date:      Years since quittin.7    Smokeless tobacco: Never   Vaping Use    Vaping Use: Never used   Substance and Sexual Activity    Alcohol use: No    Drug use: No    Sexual activity: Defer        SURGICAL HISTORY:   Past Surgical History:   Procedure Laterality Date    CARDIAC DEFIBRILLATOR PLACEMENT      CHOLECYSTECTOMY      COLONOSCOPY  2019 "    ENDOSCOPY      TOTAL SHOULDER ARTHROPLASTY Left 2/24/2020    Procedure: TOTAL SHOULDER ARTHROPLASTY REVERSE LEFT;  Surgeon: Carl Perez MD;  Location: [facility] OR;  Service: Orthopedics;  Laterality: Left;    TOTAL SHOULDER ARTHROPLASTY W/ DISTAL CLAVICLE EXCISION Right 1/21/2019    Procedure: TOTAL SHOULDER REVERSE ARTHROPLASTY WITH BICEP TENODESIS RIGHT;  Surgeon: Carl Perez MD;  Location: [facility] OR;  Service: Orthopedics    TUBAL ABDOMINAL LIGATION          ALLERGIES: Lisinopril and Entresto [sacubitril-valsartan]       PHYSICAL EXAM:   VITAL SIGNS:   Vitals:    09/16/23 1030   BP: 156/93   Pulse: 70   Resp:    Temp:    SpO2: 91%      CONSTITUTIONAL: Awake, well appearing, nontoxic   HENT: Atraumatic, normocephalic, oral mucosa moist, airway patent. Nares patent without drainage. External ears normal.   EYES: Conjunctivae clear, EOMI, PERRL   NECK: Trachea midline, nontender, supple   CARDIOVASCULAR: Normal heart rate, Normal rhythm.  2+ radial pulses.  PULMONARY/CHEST: Normal work of breathing. Clear to auscultation, no rhonchi, wheezes, or rales.  ABDOMINAL: Nondistended, soft, nontender, no rebound or guarding.  NEUROLOGIC: Nonfocal, moves all four extremities, no gross sensory or motor deficits.   EXTREMITIES: No clubbing, cyanosis, or edema   SKIN: Warm, Dry, No erythema, No rash       ED COURSE / MEDICAL DECISION MAKING:     Taya Lara is a 72 y.o. female who presents to the emergency department for evaluation of arm pain.  Well-developed, well-nourished elderly lady in no distress with exam as above.  Her vital signs are notable for mild hypertension.  Her oxygen saturation is normal room air at 96%.  Her exam is nonfocal.  She is neurovascular intact.  History seems most consistent with radiculopathy.  Will obtain labs, EKG.  Will give symptomatic treatment.  Disposition pending.    Differential diagnosis includes musculoskeletal pain, radiculopathy, ACS among other  etiologies.    EKG interpreted by me: ventricularly paced rhythm, normal rate, no further interpretation, this is an abnormal EKG    The registered work-up is overall reassuring.  Enzymes indeterminate and flat.  She is resting comfortably.  Will discharge home with continued outpatient follow-up.    Final diagnoses:   Right arm pain        Sawyer Baez MD  09/16/23 1100

## 2024-07-15 ENCOUNTER — TRANSCRIBE ORDERS (OUTPATIENT)
Dept: ADMINISTRATIVE | Facility: HOSPITAL | Age: 74
End: 2024-07-15
Payer: MEDICARE

## 2024-07-15 DIAGNOSIS — Z12.31 VISIT FOR SCREENING MAMMOGRAM: Primary | ICD-10-CM

## 2025-01-24 ENCOUNTER — HOSPITAL ENCOUNTER (EMERGENCY)
Facility: HOSPITAL | Age: 75
Discharge: HOME | End: 2025-01-24
Attending: EMERGENCY MEDICINE
Payer: MEDICAID

## 2025-01-24 ENCOUNTER — APPOINTMENT (OUTPATIENT)
Dept: CT IMAGING | Facility: HOSPITAL | Age: 75
End: 2025-01-24
Payer: MEDICAID

## 2025-01-24 VITALS
SYSTOLIC BLOOD PRESSURE: 183 MMHG | HEIGHT: 63 IN | HEART RATE: 75 BPM | WEIGHT: 160.05 LBS | TEMPERATURE: 98.2 F | OXYGEN SATURATION: 99 % | RESPIRATION RATE: 18 BRPM | BODY MASS INDEX: 28.36 KG/M2 | DIASTOLIC BLOOD PRESSURE: 96 MMHG

## 2025-01-24 DIAGNOSIS — M54.9 UPPER BACK PAIN ON LEFT SIDE: Primary | ICD-10-CM

## 2025-01-24 DIAGNOSIS — Q62.11 HYDRONEPHROSIS WITH URETEROPELVIC JUNCTION (UPJ) OBSTRUCTION: ICD-10-CM

## 2025-01-24 LAB
ALBUMIN SERPL-MCNC: 4.4 G/DL (ref 3.5–5.2)
ALBUMIN/GLOB SERPL: 1.7 G/DL
ALP SERPL-CCNC: 38 U/L (ref 39–117)
ALT SERPL W P-5'-P-CCNC: 16 U/L (ref 1–33)
ANION GAP SERPL CALCULATED.3IONS-SCNC: 9.1 MMOL/L (ref 5–15)
AST SERPL-CCNC: 22 U/L (ref 1–32)
BASOPHILS # BLD AUTO: 0.03 10*3/MM3 (ref 0–0.2)
BASOPHILS NFR BLD AUTO: 0.4 % (ref 0–1.5)
BILIRUB SERPL-MCNC: 0.2 MG/DL (ref 0–1.2)
BILIRUB UR QL STRIP: NEGATIVE
BUN SERPL-MCNC: 26 MG/DL (ref 8–23)
BUN/CREAT SERPL: 24.1 (ref 7–25)
CALCIUM SPEC-SCNC: 9.8 MG/DL (ref 8.6–10.5)
CHLORIDE SERPL-SCNC: 102 MMOL/L (ref 98–107)
CLARITY UR: CLEAR
CO2 SERPL-SCNC: 28.9 MMOL/L (ref 22–29)
COLOR UR: YELLOW
CREAT SERPL-MCNC: 1.08 MG/DL (ref 0.57–1)
DEPRECATED RDW RBC AUTO: 46.3 FL (ref 37–54)
EGFRCR SERPLBLD CKD-EPI 2021: 54 ML/MIN/1.73
EOSINOPHIL # BLD AUTO: 0.08 10*3/MM3 (ref 0–0.4)
EOSINOPHIL NFR BLD AUTO: 0.9 % (ref 0.3–6.2)
ERYTHROCYTE [DISTWIDTH] IN BLOOD BY AUTOMATED COUNT: 13.6 % (ref 12.3–15.4)
GLOBULIN UR ELPH-MCNC: 2.6 GM/DL
GLUCOSE SERPL-MCNC: 142 MG/DL (ref 65–99)
GLUCOSE UR STRIP-MCNC: NEGATIVE MG/DL
HCT VFR BLD AUTO: 39 % (ref 34–46.6)
HGB BLD-MCNC: 12.8 G/DL (ref 12–15.9)
HGB UR QL STRIP.AUTO: NEGATIVE
IMM GRANULOCYTES # BLD AUTO: 0.01 10*3/MM3 (ref 0–0.05)
IMM GRANULOCYTES NFR BLD AUTO: 0.1 % (ref 0–0.5)
KETONES UR QL STRIP: NEGATIVE
LEUKOCYTE ESTERASE UR QL STRIP.AUTO: NEGATIVE
LIPASE SERPL-CCNC: 156 U/L (ref 13–60)
LYMPHOCYTES # BLD AUTO: 2.2 10*3/MM3 (ref 0.7–3.1)
LYMPHOCYTES NFR BLD AUTO: 25.7 % (ref 19.6–45.3)
MCH RBC QN AUTO: 30 PG (ref 26.6–33)
MCHC RBC AUTO-ENTMCNC: 32.8 G/DL (ref 31.5–35.7)
MCV RBC AUTO: 91.5 FL (ref 79–97)
MONOCYTES # BLD AUTO: 0.83 10*3/MM3 (ref 0.1–0.9)
MONOCYTES NFR BLD AUTO: 9.7 % (ref 5–12)
NEUTROPHILS NFR BLD AUTO: 5.4 10*3/MM3 (ref 1.7–7)
NEUTROPHILS NFR BLD AUTO: 63.2 % (ref 42.7–76)
NITRITE UR QL STRIP: NEGATIVE
NRBC BLD AUTO-RTO: 0 /100 WBC (ref 0–0.2)
PH UR STRIP.AUTO: 6 [PH] (ref 5–8)
PLATELET # BLD AUTO: 270 10*3/MM3 (ref 140–450)
PMV BLD AUTO: 11.5 FL (ref 6–12)
POTASSIUM SERPL-SCNC: 3.5 MMOL/L (ref 3.5–5.2)
PROT SERPL-MCNC: 7 G/DL (ref 6–8.5)
PROT UR QL STRIP: NEGATIVE
RBC # BLD AUTO: 4.26 10*6/MM3 (ref 3.77–5.28)
SODIUM SERPL-SCNC: 140 MMOL/L (ref 136–145)
SP GR UR STRIP: 1.01 (ref 1–1.03)
UROBILINOGEN UR QL STRIP: NORMAL
WBC NRBC COR # BLD AUTO: 8.55 10*3/MM3 (ref 3.4–10.8)

## 2025-01-24 PROCEDURE — 87086 URINE CULTURE/COLONY COUNT: CPT

## 2025-01-24 PROCEDURE — 96374 THER/PROPH/DIAG INJ IV PUSH: CPT

## 2025-01-24 PROCEDURE — 25010000002 ONDANSETRON PER 1 MG

## 2025-01-24 PROCEDURE — 25810000003 SODIUM CHLORIDE 0.9 % SOLUTION

## 2025-01-24 PROCEDURE — 85025 COMPLETE CBC W/AUTO DIFF WBC: CPT

## 2025-01-24 PROCEDURE — 81003 URINALYSIS AUTO W/O SCOPE: CPT

## 2025-01-24 PROCEDURE — 25510000001 IOPAMIDOL 61 % SOLUTION: Performed by: EMERGENCY MEDICINE

## 2025-01-24 PROCEDURE — 83690 ASSAY OF LIPASE: CPT

## 2025-01-24 PROCEDURE — 99285 EMERGENCY DEPT VISIT HI MDM: CPT | Performed by: EMERGENCY MEDICINE

## 2025-01-24 PROCEDURE — 96375 TX/PRO/DX INJ NEW DRUG ADDON: CPT

## 2025-01-24 PROCEDURE — 74177 CT ABD & PELVIS W/CONTRAST: CPT

## 2025-01-24 PROCEDURE — 25010000002 KETOROLAC TROMETHAMINE PER 15 MG

## 2025-01-24 PROCEDURE — 80053 COMPREHEN METABOLIC PANEL: CPT

## 2025-01-24 RX ORDER — HYDROCODONE BITARTRATE AND ACETAMINOPHEN 5; 325 MG/1; MG/1
1 TABLET ORAL EVERY 6 HOURS PRN
Qty: 12 TABLET | Refills: 0 | Status: SHIPPED | OUTPATIENT
Start: 2025-01-24

## 2025-01-24 RX ORDER — IOPAMIDOL 612 MG/ML
100 INJECTION, SOLUTION INTRAVASCULAR
Status: COMPLETED | OUTPATIENT
Start: 2025-01-24 | End: 2025-01-24

## 2025-01-24 RX ORDER — ONDANSETRON 2 MG/ML
4 INJECTION INTRAMUSCULAR; INTRAVENOUS ONCE
Status: COMPLETED | OUTPATIENT
Start: 2025-01-24 | End: 2025-01-24

## 2025-01-24 RX ORDER — KETOROLAC TROMETHAMINE 30 MG/ML
15 INJECTION, SOLUTION INTRAMUSCULAR; INTRAVENOUS ONCE
Status: COMPLETED | OUTPATIENT
Start: 2025-01-24 | End: 2025-01-24

## 2025-01-24 RX ORDER — ONDANSETRON 4 MG/1
4 TABLET, ORALLY DISINTEGRATING ORAL EVERY 8 HOURS PRN
Qty: 21 TABLET | Refills: 0 | Status: SHIPPED | OUTPATIENT
Start: 2025-01-24 | End: 2025-01-31

## 2025-01-24 RX ADMIN — SODIUM CHLORIDE 1000 ML: 9 INJECTION, SOLUTION INTRAVENOUS at 18:49

## 2025-01-24 RX ADMIN — ONDANSETRON 4 MG: 2 INJECTION INTRAMUSCULAR; INTRAVENOUS at 18:49

## 2025-01-24 RX ADMIN — KETOROLAC TROMETHAMINE 15 MG: 30 INJECTION, SOLUTION INTRAMUSCULAR; INTRAVENOUS at 18:48

## 2025-01-24 RX ADMIN — IOPAMIDOL 100 ML: 612 INJECTION, SOLUTION INTRAVENOUS at 19:41

## 2025-01-24 NOTE — ED PROVIDER NOTES
" EMERGENCY DEPARTMENT ENCOUNTER    Pt Name: Taya Lara  MRN: [id number]  Pt :   1950  Room Number:    Date of encounter:  2025  PCP: Alley Finney MD  ED Provider: Junior Saunders PA-C    Historian: Patient, nursing notes      HPI:  Chief Complaint: Left upper back pain        Context: Taya Lara is a 74 y.o. female who presents to the ED c/o pain in her left flank and left upper back for the past 2 days.  Patient states that the pain was more dull and intermittent yesterday but since 7 AM this morning it has been near constant.  Patient denies any foul-smelling urine, hematuria, dysuria, urinary frequency or urgency, nausea or vomiting, chest pain or shortness of breath.      PAST MEDICAL HISTORY  Past Medical History:   Diagnosis Date    Arthritis     Cataract     unsure which eye, mild    Deaf     Diabetes mellitus     doesnt check sugar often     Disease of thyroid gland     GERD (gastroesophageal reflux disease)     Heartburn     occasionally    History of kidney stones     History of transfusion     FFP        Hyperlipidemia     Hypertension     Kidney disorder     saw doctor and states dr said (left?) kidney swollen but pt had no s/s- following up with nephro to check out later     NICM (nonischemic cardiomyopathy)     Psoriasis     \"vaginal area\"    Psoriatic arthritis     Wears dentures     upper     Wears glasses          PAST SURGICAL HISTORY  Past Surgical History:   Procedure Laterality Date    CARDIAC DEFIBRILLATOR PLACEMENT      CHOLECYSTECTOMY      COLONOSCOPY  2019    ENDOSCOPY      TOTAL SHOULDER ARTHROPLASTY Left 2020    Procedure: TOTAL SHOULDER ARTHROPLASTY REVERSE LEFT;  Surgeon: Carl Perez MD;  Location: Replaced by [facility];  Service: Orthopedics;  Laterality: Left;    TOTAL SHOULDER ARTHROPLASTY W/ DISTAL CLAVICLE EXCISION Right 2019    Procedure: TOTAL SHOULDER REVERSE ARTHROPLASTY WITH BICEP TENODESIS RIGHT;  Surgeon: Carl Perez, " MD;  Location: [facility];  Service: Orthopedics    TUBAL ABDOMINAL LIGATION           FAMILY HISTORY  History reviewed. No pertinent family history.      SOCIAL HISTORY  Social History     Socioeconomic History    Marital status: Single   Tobacco Use    Smoking status: Former     Current packs/day: 0.00     Average packs/day: 1 pack/day for 19.0 years (19.0 ttl pk-yrs)     Types: Cigarettes     Start date:      Quit date:      Years since quittin.0    Smokeless tobacco: Never   Vaping Use    Vaping status: Never Used   Substance and Sexual Activity    Alcohol use: No    Drug use: No    Sexual activity: Defer         ALLERGIES  Lisinopril and Entresto [sacubitril-valsartan]        REVIEW OF SYSTEMS  Review of Systems   Constitutional:  Negative for chills and fever.   HENT:  Negative for congestion and sore throat.    Respiratory:  Negative for cough and shortness of breath.    Cardiovascular:  Negative for chest pain.   Gastrointestinal:  Negative for abdominal pain, nausea and vomiting.   Genitourinary:  Positive for flank pain. Negative for dysuria.   Musculoskeletal:  Positive for back pain.   Skin:  Negative for wound.   Neurological:  Negative for dizziness and headaches.   Psychiatric/Behavioral:  Negative for confusion.    All other systems reviewed and are negative.         All systems reviewed and negative except for those discussed in HPI.       PHYSICAL EXAM    I have reviewed the triage vital signs and nursing notes.    ED Triage Vitals [25 1822]   Temp Heart Rate Resp BP SpO2   -- 76 18 (!) 186/85 95 %      Temp src Heart Rate Source Patient Position BP Location FiO2 (%)   -- -- -- -- --       Physical Exam  Vitals and nursing note reviewed.   Constitutional:       General: She is not in acute distress.     Appearance: She is not ill-appearing, toxic-appearing or diaphoretic.   HENT:      Head: Normocephalic and atraumatic.      Mouth/Throat:      Mouth: Mucous membranes are moist.       Pharynx: Oropharynx is clear.   Eyes:      Extraocular Movements: Extraocular movements intact.   Cardiovascular:      Rate and Rhythm: Normal rate.      Heart sounds: Normal heart sounds.   Pulmonary:      Effort: Pulmonary effort is normal. No respiratory distress.      Breath sounds: Normal breath sounds.   Abdominal:      Tenderness: There is no abdominal tenderness. There is left CVA tenderness. There is no right CVA tenderness, guarding or rebound.      Comments: Left flank tenderness   Skin:     General: Skin is warm and dry.      Findings: No rash.   Neurological:      Mental Status: She is alert.             LAB RESULTS  Recent Results (from the past 24 hours)   Comprehensive Metabolic Panel    Collection Time: 01/24/25  6:39 PM    Specimen: Blood   Result Value Ref Range    Glucose 142 (H) 65 - 99 mg/dL    BUN 26 (H) 8 - 23 mg/dL    Creatinine 1.08 (H) 0.57 - 1.00 mg/dL    Sodium 140 136 - 145 mmol/L    Potassium 3.5 3.5 - 5.2 mmol/L    Chloride 102 98 - 107 mmol/L    CO2 28.9 22.0 - 29.0 mmol/L    Calcium 9.8 8.6 - 10.5 mg/dL    Total Protein 7.0 6.0 - 8.5 g/dL    Albumin 4.4 3.5 - 5.2 g/dL    ALT (SGPT) 16 1 - 33 U/L    AST (SGOT) 22 1 - 32 U/L    Alkaline Phosphatase 38 (L) 39 - 117 U/L    Total Bilirubin 0.2 0.0 - 1.2 mg/dL    Globulin 2.6 gm/dL    A/G Ratio 1.7 g/dL    BUN/Creatinine Ratio 24.1 7.0 - 25.0    Anion Gap 9.1 5.0 - 15.0 mmol/L    eGFR 54.0 (L) >60.0 mL/min/1.73   Lipase    Collection Time: 01/24/25  6:39 PM    Specimen: Blood   Result Value Ref Range    Lipase 156 (H) 13 - 60 U/L   CBC Auto Differential    Collection Time: 01/24/25  6:39 PM    Specimen: Blood   Result Value Ref Range    WBC 8.55 3.40 - 10.80 10*3/mm3    RBC 4.26 3.77 - 5.28 10*6/mm3    Hemoglobin 12.8 12.0 - 15.9 g/dL    Hematocrit 39.0 34.0 - 46.6 %    MCV 91.5 79.0 - 97.0 fL    MCH 30.0 26.6 - 33.0 pg    MCHC 32.8 31.5 - 35.7 g/dL    RDW 13.6 12.3 - 15.4 %    RDW-SD 46.3 37.0 - 54.0 fl    MPV 11.5 6.0 - 12.0 fL     Platelets 270 140 - 450 10*3/mm3    Neutrophil % 63.2 42.7 - 76.0 %    Lymphocyte % 25.7 19.6 - 45.3 %    Monocyte % 9.7 5.0 - 12.0 %    Eosinophil % 0.9 0.3 - 6.2 %    Basophil % 0.4 0.0 - 1.5 %    Immature Grans % 0.1 0.0 - 0.5 %    Neutrophils, Absolute 5.40 1.70 - 7.00 10*3/mm3    Lymphocytes, Absolute 2.20 0.70 - 3.10 10*3/mm3    Monocytes, Absolute 0.83 0.10 - 0.90 10*3/mm3    Eosinophils, Absolute 0.08 0.00 - 0.40 10*3/mm3    Basophils, Absolute 0.03 0.00 - 0.20 10*3/mm3    Immature Grans, Absolute 0.01 0.00 - 0.05 10*3/mm3    nRBC 0.0 0.0 - 0.2 /100 WBC   Urinalysis With Culture If Indicated - Urine, Clean Catch    Collection Time: 01/24/25  6:45 PM    Specimen: Urine, Clean Catch   Result Value Ref Range    Color, UA Yellow Yellow, Straw    Appearance, UA Clear Clear    pH, UA 6.0 5.0 - 8.0    Specific Gravity, UA 1.013 1.005 - 1.030    Glucose, UA Negative Negative    Ketones, UA Negative Negative    Bilirubin, UA Negative Negative    Blood, UA Negative Negative    Protein, UA Negative Negative    Leuk Esterase, UA Negative Negative    Nitrite, UA Negative Negative    Urobilinogen, UA 1.0 E.U./dL 0.2 - 1.0 E.U./dL       If labs were ordered, I independently reviewed the results and considered them in treating the patient.        RADIOLOGY  CT Abdomen Pelvis With Contrast    Result Date: 1/24/2025  FINAL REPORT TECHNIQUE: null CLINICAL HISTORY: left CVA tenderness/left flank pain COMPARISON: null FINDINGS: CT abdomen and pelvis with contrast Comparison: None provided. Findings: Partially imaged mild cardiomegaly and pacemaker. Cholecystectomy with mild bile duct dilatation. 20 cm hepatomegaly. 2.7 cm cyst upper pole right kidney. Severe left and mild right hydronephrosis with no hydroureter or definite obstructing calculus. Apparent left renal cortical thinning. Question mild delay of perfusion of left kidney although suboptimal on corticomedullary phase imaging. Normal cortical thickness of right kidney.  12.5 cm normal length/size of right kidney with 9 cm mildly diminished overall length of left kidney. Findings suggest left-greater-than-right ureteropelvic junction (UPJ) obstruction with probable left sided underlying chronicity and atrophy. Numerous phleboliths along path of ureters and in lower pelvis. No significant perinephric stranding. Minimal nodular thickening bilateral adrenal glands. Comparison with prior studies suggested. Respiratory motion artifact. Spleen, pancreas, and appendix are within normal limits. Arteriosclerosis. No abdominal aortic aneurysm or dissection. Mild diverticulosis. No diverticulitis seen. Redundant colon with moderately severe fecal retention. No significant bowel distention. Trace free intraperitoneal fluid in right posterior subhepatic space/proximal paracolic gutter of unclear etiology. Follow-up recommended as clinically indicated. No pneumoperitoneum, abscess, adenopathy. Leftward curvature lumbar spine positional versus levoscoliosis. Grade 1 anterolisthesis L5 over S1. Spondylosis. Osteoporosis.     Impression: 1. Left-greater-than-right right UPJ obstruction with moderately severe left and mild right hydronephrosis. Probable underlying left-sided chronicity and mild atrophy. 2. Redundant colon with moderately severe fecal retention. 3. Trace free intraperitoneal fluid of unclear etiology. Follow-up as clinically indicated. Authenticated and Electronically Signed by Keri Fox MD on 01/24/2025 08:23:38 PM     I ordered and independently reviewed the above noted radiographic studies.      I viewed images of CT abdomen and pelvis which showed worsening bilateral hydronephrosis worse on the left with chronic UPJ obstruction per my independent interpretation.    See radiologist's dictation for official interpretation.        PROCEDURES    Procedures    No orders to display       MEDICATIONS GIVEN IN ER    Medications   ketorolac (TORADOL) injection 15 mg (15 mg Intravenous  Given 1/24/25 1848)   ondansetron (ZOFRAN) injection 4 mg (4 mg Intravenous Given 1/24/25 1849)   sodium chloride 0.9 % bolus 1,000 mL (0 mL Intravenous Stopped 1/24/25 2046)   iopamidol (ISOVUE-300) 61 % injection 100 mL (100 mL Intravenous Given 1/24/25 1941)         MEDICAL DECISION MAKING, PROGRESS, and CONSULTS    All labs, if obtained, have been independently reviewed by me.  All radiology studies, if obtained, have been reviewed by me and the radiologist dictating the report.  All EKG's, if obtained, have been independently viewed and interpreted by me/my attending physician.      Discussion below represents my analysis of pertinent findings related to patient's condition, differential diagnosis, treatment plan and final disposition.    Patient is a 74-year-old female presenting to the emergency department for evaluation of left-sided upper back pain over her kidney she is tender on the CVA region prompted a CT scan of her abdomen and pelvis she does have documented history of chronic left hydronephrosis with UPJ obstruction.  CT today showed worsening of this hydronephrosis which is now bilateral and worse on the left with again reiterated UPJ obstruction that is chronic in appearance.  Her labs today and urinalysis were unremarkable and reassuring I explained this to the patient and she will follow-up on a close outpatient basis with urology but no clinical indication for admission or acute intervention tonight.  Strict ED return precautions were explained and the patient verbalized understanding of and agreement with this plan of care.                     Differential diagnosis:    Differential diagnosis included but was not limited to hydronephrosis, urinary obstruction, constipation, musculoskeletal pain, UTI, pyelonephritis      Additional sources:    - Discussed/ obtained information from independent historians: None    - External (non-ED) record review: I reviewed patient's recent CT abdomen and  pelvis scan showing she did have what appeared to be chronic UVJ obstruction and left hydronephrosis    - Chronic or social conditions impacting care: Language barrier, patient uses American sign language to communicate and therefore a hospital approved  used throughout entirety of ED course    Orders placed during this visit:  Orders Placed This Encounter   Procedures    Urine Culture - Urine,    CT Abdomen Pelvis With Contrast    Comprehensive Metabolic Panel    Lipase    Urinalysis With Culture If Indicated - Urine, Clean Catch    CBC Auto Differential    Ambulatory Referral to Urology    CBC & Differential         Additional orders considered but not ordered: None      ED Course:    Consultants: None    ED Course as of 01/24/25 2127 Fri Jan 24, 2025 1918 Lipase(!): 156 [TG]      ED Course User Index  [TG] Junior Saunders PA-C              Shared Decision Making:  After my consideration of clinical presentation and any laboratory/radiology studies obtained, I discussed the findings with the patient/patient representative who is in agreement with the treatment plan and the final disposition.   Risks and benefits of discharge and/or observation/admission were discussed.       AS OF 21:27 EST VITALS:    BP - (!) 183/96  HR - 75  TEMP - 98.2 °F (36.8 °C) (Oral)  O2 SATS - 99%                  DIAGNOSIS  Final diagnoses:   Upper back pain on left side   Hydronephrosis with ureteropelvic junction (UPJ) obstruction         DISPOSITION  Discharge      Please note that portions of this document were completed with voice recognition software.      Junior Saunders PA-C  01/24/25 2127

## 2025-01-26 LAB — BACTERIA SPEC AEROBE CULT: NORMAL

## (undated) DEVICE — STRYKER PERFORMANCE SERIES SAGITTAL BLADE: Brand: STRYKER PERFORMANCE SERIES

## (undated) DEVICE — KT PIN HEX SHLDR CORACOID EXACTECHGPS DISP

## (undated) DEVICE — SUT MONOCRYL PLS ANTIB UND 3/0  PS1 27IN

## (undated) DEVICE — SYR LUERLOK 30CC

## (undated) DEVICE — T4 PULLOVER TOGA, LARGE

## (undated) DEVICE — SLNG ULTRSLING2 11TO13IN MD

## (undated) DEVICE — DRSNG SURG AQUACEL AG 9X15CM

## (undated) DEVICE — POSTN HD UNIV

## (undated) DEVICE — HANDPIECE SET WITH HIGH FLOW TIP AND SUCTION TUBE: Brand: INTERPULSE

## (undated) DEVICE — GLV SURG SIGNATURE TOUCH PF LTX 8 STRL BX/50

## (undated) DEVICE — BIT DRL EQUINOXE 2 AND 3.2MM

## (undated) DEVICE — SUCTION CANISTER, 2500CC, RIGID: Brand: DEROYAL

## (undated) DEVICE — SLNG ULTRSLING3 13TO15IN LG

## (undated) DEVICE — PK MAJ SHLDR SPLT 10

## (undated) DEVICE — SST TWIST DRILL, STANDARD, 2.4MM DIA. X 127MM: Brand: MICROAIRE®

## (undated) DEVICE — STERILE PVP: Brand: MEDLINE INDUSTRIES, INC.

## (undated) DEVICE — SKIN AFFIX SURG ADHESIVE 72/CS 0.55ML: Brand: MEDLINE

## (undated) DEVICE — CVR HNDL LT SURG ACCSSRY BLU STRL

## (undated) DEVICE — KT SHLDR EXACTECHGPS DISP

## (undated) DEVICE — PUMP PAIN AUTOFUSER AUTO SELCT NOBOLUS 1TO14ML/HR 550ML DISP

## (undated) DEVICE — ANTIBACTERIAL UNDYED BRAIDED (POLYGLACTIN 910), SYNTHETIC ABSORBABLE SUTURE: Brand: COATED VICRYL

## (undated) DEVICE — HOLDER: Brand: DEROYAL

## (undated) DEVICE — DRAPE,SHOULDER,BEACH CHAIR,STERILE: Brand: MEDLINE

## (undated) DEVICE — GLV SURG TRIUMPH CLASSIC PF LTX 7.5 STRL

## (undated) DEVICE — DRSNG SURESITE123 4X10IN

## (undated) DEVICE — SYRINGE,PISTON,IRRIGATION,60ML,STERILE: Brand: MEDLINE

## (undated) DEVICE — PULLOVER TOGA, X-LARGE: Brand: FLYTE, SURGICOOL

## (undated) DEVICE — 3M™ IOBAN™ 2 ANTIMICROBIAL INCISE DRAPE 6650EZ: Brand: IOBAN™ 2